# Patient Record
Sex: MALE
[De-identification: names, ages, dates, MRNs, and addresses within clinical notes are randomized per-mention and may not be internally consistent; named-entity substitution may affect disease eponyms.]

---

## 2017-09-19 ENCOUNTER — HOSPITAL ENCOUNTER (INPATIENT)
Dept: HOSPITAL 31 - C.ER | Age: 47
LOS: 6 days | Discharge: HOME | DRG: 745 | End: 2017-09-25
Attending: PSYCHIATRY & NEUROLOGY | Admitting: PSYCHIATRY & NEUROLOGY
Payer: MEDICAID

## 2017-09-19 VITALS — BODY MASS INDEX: 29 KG/M2

## 2017-09-19 DIAGNOSIS — F32.9: ICD-10-CM

## 2017-09-19 DIAGNOSIS — F17.210: ICD-10-CM

## 2017-09-19 DIAGNOSIS — G89.29: ICD-10-CM

## 2017-09-19 DIAGNOSIS — F11.23: Primary | ICD-10-CM

## 2017-09-19 DIAGNOSIS — F13.239: ICD-10-CM

## 2017-09-19 DIAGNOSIS — F41.9: ICD-10-CM

## 2017-09-19 LAB
ALBUMIN/GLOB SERPL: 1.4 {RATIO} (ref 1–2.1)
ALP SERPL-CCNC: 81 U/L (ref 38–126)
ALT SERPL-CCNC: 48 U/L (ref 21–72)
AST SERPL-CCNC: 36 U/L (ref 17–59)
BASOPHILS # BLD AUTO: 0.1 K/UL (ref 0–0.2)
BASOPHILS NFR BLD: 0.6 % (ref 0–2)
BILIRUB SERPL-MCNC: 0.5 MG/DL (ref 0.2–1.3)
BILIRUB UR-MCNC: NEGATIVE MG/DL
BUN SERPL-MCNC: 13 MG/DL (ref 9–20)
CALCIUM SERPL-MCNC: 8.9 MG/DL (ref 8.6–10.4)
CHLORIDE SERPL-SCNC: 102 MMOL/L (ref 98–107)
CO2 SERPL-SCNC: 25 MMOL/L (ref 22–30)
EOSINOPHIL # BLD AUTO: 0.2 K/UL (ref 0–0.7)
EOSINOPHIL NFR BLD: 1.5 % (ref 0–4)
ERYTHROCYTE [DISTWIDTH] IN BLOOD BY AUTOMATED COUNT: 13.2 % (ref 11.5–14.5)
ETHANOL SERPL-MCNC: < 10 MG/DL (ref 0–10)
GLOBULIN SER-MCNC: 3.1 GM/DL (ref 2.2–3.9)
GLUCOSE SERPL-MCNC: 75 MG/DL (ref 75–110)
GLUCOSE UR STRIP-MCNC: NORMAL MG/DL
HCT VFR BLD CALC: 40.3 % (ref 35–51)
KETONES UR STRIP-MCNC: NEGATIVE MG/DL
LEUKOCYTE ESTERASE UR-ACNC: (no result) LEU/UL
LYMPHOCYTES # BLD AUTO: 3 K/UL (ref 1–4.3)
LYMPHOCYTES NFR BLD AUTO: 27.4 % (ref 20–40)
MCH RBC QN AUTO: 30.1 PG (ref 27–31)
MCHC RBC AUTO-ENTMCNC: 34.5 G/DL (ref 33–37)
MCV RBC AUTO: 87.2 FL (ref 80–94)
MONOCYTES # BLD: 0.8 K/UL (ref 0–0.8)
MONOCYTES NFR BLD: 7 % (ref 0–10)
NRBC BLD AUTO-RTO: 0.1 % (ref 0–2)
PH UR STRIP: 5 [PH] (ref 5–8)
PLATELET # BLD: 148 K/UL (ref 130–400)
PMV BLD AUTO: 10.6 FL (ref 7.2–11.7)
POTASSIUM SERPL-SCNC: 3.8 MMOL/L (ref 3.6–5.2)
PROT SERPL-MCNC: 7.4 G/DL (ref 6.3–8.3)
PROT UR STRIP-MCNC: NEGATIVE MG/DL
RBC # UR STRIP: NEGATIVE /UL
RBC #/AREA URNS HPF: < 1 /HPF (ref 0–3)
SODIUM SERPL-SCNC: 139 MMOL/L (ref 132–148)
SP GR UR STRIP: 1.02 (ref 1–1.03)
UROBILINOGEN UR-MCNC: NORMAL MG/DL (ref 0.2–1)
WBC # BLD AUTO: 11 K/UL (ref 4.8–10.8)
WBC #/AREA URNS HPF: < 1 /HPF (ref 0–5)

## 2017-09-19 PROCEDURE — HZ2ZZZZ DETOXIFICATION SERVICES FOR SUBSTANCE ABUSE TREATMENT: ICD-10-PCS | Performed by: PSYCHIATRY & NEUROLOGY

## 2017-09-19 PROCEDURE — HZ59ZZZ INDIVIDUAL PSYCHOTHERAPY FOR SUBSTANCE ABUSE TREATMENT, SUPPORTIVE: ICD-10-PCS | Performed by: PSYCHIATRY & NEUROLOGY

## 2017-09-19 PROCEDURE — GZ3ZZZZ MEDICATION MANAGEMENT: ICD-10-PCS | Performed by: PSYCHIATRY & NEUROLOGY

## 2017-09-19 PROCEDURE — HZ46ZZZ GROUP COUNSELING FOR SUBSTANCE ABUSE TREATMENT, PSYCHOEDUCATION: ICD-10-PCS | Performed by: PSYCHIATRY & NEUROLOGY

## 2017-09-19 NOTE — PCM.BM
<Meli Tello - Last Filed: 09/19/17 20:06>





Treatment Plan Problems





- Problems identified on initial assessmt


  ** potential for opiates witdrawal


Date Initiated: 09/19/17


Time Initiated: 20:07


Assessment reference: NA


Status: Active





  ** anxiety


Date Initiated: 09/19/17


Time Initiated: 20:08


Assessment reference: NA


Status: Active





Treatment assets and liabiliti


Patient Assests: cooperative, insightful, motivated, self-reliant, ADL 

independent, physically healthy, good support system, negotiates basic needs, 

cognitively intact


Patient Liabilities: substance abuse, medical problems





- Milieu Protocol


Maintain good personal hygiene: daily Encourage regular showers, daily Remind 

patient to perform daily oral care, daily Assist patient to perform ADL's


Conduct patient checks and document Observation sheet: Q15 minutes


Maintain personal safety: every shift Educate patient to report safety concerns 

to staff, every shift Monitor environment for contraband/sharps


Medication safety: Monitor for expected outcome, potential side effects: every 

shift, Assess barriers to learning: every shift, Assess readiness for 

medication education: every shift





<Juanita Bishop - Last Filed: 09/20/17 15:33>





Family Contact


Family involvement: Family/SO is involved


Family contact: Patient agrees to contact, Telephone contact initiated by staff





- Goals for Treatment


Patient goals for treatment: Complete detox and accept referral for pain 

management MD. Incorporate o/p therapy in conjunction.





Discharge/Continuing Care





- Education Needs


Education Needs: Patient Medication, Patient Diagnosis/Disease Process, Patient 

Coping Skills, Patient Placement options, Patient Community resources, Patient 

Pain





- Discharge


Discharge Criteria: Normal sleep pattern, No longer exhibiting s/s of withdrawal

, Reduction of target symptoms


Discharge to:: With Family





- Treatment Team Participation


Patient/Family/SO Statement: 





09/20/17 15:33


"I need a new pain management doctor who won't put me back on opiates."


Discussed with Family/SO: No


Was Patient/Family/SO present at Treatment Team Meeting: Yes





<Roc Vasquez - Last Filed: 09/27/17 00:33>





- Diagnosis


(1) Opioid dependence


Status: Acute   


Interventions: 





09/25/17 11:32


* Assess 7x/week regarding severity of withdrawal


* Educate regarding risks, benefits, side effects and alternatives of 

medications


* Use Motivational Interviewing for abstinence


* Use CBT for relapse prevention


* Medication management for withdrawal symptoms


* Encourage medication assisted treatment
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
## 2017-09-21 RX ADMIN — BUPRENORPHINE HCL SCH MG: 2 TABLET SUBLINGUAL at 10:06

## 2017-09-21 NOTE — PCM.PYCHPN
Psychiatric Progress Note





- Psychiatric Progress Note


Patient seen today, length of contact: 15 minutes


Patient Chief Complaint: 


"I feel like hell"


Problems Identified/Issues Discussed: 


The patient was seen, chart reviewed, case discussed with staff. 


The patient is compliant with medications and reports no side effects. He 

states that he felt a little better after getting medications yesterday. 


Patient states that he slept a little bit last night but feels "like hell" 

right now. He reports feeling exhausted, down, and very tired. 


Patient needs more time to stabilize. 


After care discussed, support and psychoeducation given.


Medication Change: Yes (detox changes daily; Baclofen 10mg PO)


Medical Record Reviewed: Yes





Mental Status Examination





- Cognitive Function


Orientation: Person, Place, Situation, Time


Memory: Intact


Attention: Poor


Concentration: Poor


Association: WNL


Fund of Knowledge: WNL





- Mood


Mood: Anxious





- Affect


Affect: Constricted





- Formal Thought Process


Formal Thought Process: No Impairment





- Suicidal Ideation


Suicidal Ideation: No





- Homicidal Ideation


Homicidal Ideation: No





Goal/Treatment Plan





- Goal/Treatment Plan


Need for Continued Stay: Discharge may exacerbated symptoms


Progress Toward Problem(s) and Goals/Treatment Plan: 


Subutex detox


As needed medications


Gabapentin for augmentation


Attend groups and activities


Supportive therapy and psychoeducation


MI for abstinence


CBT for relapse prevention


Encourage MAT


Refer to rehab or IOP


Attend self-help groups as well

## 2017-09-22 RX ADMIN — BUPRENORPHINE HCL SCH MG: 2 TABLET SUBLINGUAL at 09:28

## 2017-09-22 NOTE — PCM.PYCHPN
Psychiatric Progress Note





- Psychiatric Progress Note


Patient seen today, length of contact: 15 minutes


Patient Chief Complaint: 


"I feel bad"


Problems Identified/Issues Discussed: 


The pt is seen, chart reviewed, case discussed with staff.


Support given, CBT and MI used briefly


No new symptoms reported, improving slowly and needs more time


No SEs from medications, risks discussed.


After care discussed


Medication Change: Yes (detox changes daily)


Medical Record Reviewed: Yes





Mental Status Examination





- Cognitive Function


Orientation: Person, Place, Situation, Time


Memory: Intact


Attention: Poor


Concentration: Poor


Association: WNL


Fund of Knowledge: WNL





- Mood


Mood: Anxious





- Affect


Affect: Constricted





- Formal Thought Process


Formal Thought Process: No Impairment





- Suicidal Ideation


Suicidal Ideation: No





- Homicidal Ideation


Homicidal Ideation: No





Goal/Treatment Plan





- Goal/Treatment Plan


Need for Continued Stay: Discharge may exacerbated symptoms


Progress Toward Problem(s) and Goals/Treatment Plan: 


Subutex detox


As needed medications


Gabapentin for augmentation


Attend groups and activities


Supportive therapy and psychoeducation


MI for abstinence


CBT for relapse prevention


Encourage MAT


Refer to rehab or IOP


Attend self-help groups as well

## 2017-09-23 RX ADMIN — BUPRENORPHINE HCL SCH MG: 2 TABLET SUBLINGUAL at 09:48

## 2017-09-23 NOTE — PCM.PYCHPN
Psychiatric Progress Note





- Psychiatric Progress Note


Patient seen today, length of contact: 15 minutes


Patient Chief Complaint: 





I still feel anxiety


Problems Identified/Issues Discussed: 





Patient seen. Chart reviewed. Case discussed with the staff.


Issues related to illness and treatment were discussed with the patient.


Reported compliant with treatment with no adverse affects. Tolerating treatment 

very well.


Reported feeling much better with treatment but still feels anxiety at times.


The time of evaluation, patient was awake alert oriented 3, no delusions, no 

auditory or visual hallucinations, no suicidal ideations or homicidal ideations.


Medical Problems: 





None reported


Diagnostic Results: 





Reviewed


DSM 5 Symptoms Update: 





Improving with treatment


Medication Change: No


Medical Record Reviewed: Yes





Mental Status Examination





- Cognitive Function


Orientation: Person, Place, Situation, Time


Memory: Intact


Attention: WNL


Concentration: WNL


Association: WNL


Fund of Knowledge: [facility]


Decription of patient's judgement and insights: 





Fair





- Mood


Mood: Anxious





- Affect


Affect: Other (Appropriate)





- Speech


Speech: Appropriate





- Formal Thought Process


Formal Thought Process: No Impairment


Psychotic Thoughts and Behaviors: 





None





- Suicidal Ideation


Suicidal Ideation: No





- Homicidal Ideation


Homicidal Ideation: No





Goal/Treatment Plan





- Goal/Treatment Plan


Need for Continued Stay: Remain at risks for inpatient hospitalization, 

Discharge may exacerbated symptoms, Severe functional impairment


Progress Toward Problem(s) and Goals/Treatment Plan: 





Patient education


Supportive therapy


Continue treatment as before





Estimated Date of D/C: 09/25/17





- Smoking Cessation


Smoking Cessation Initiated: No

## 2017-09-24 VITALS — RESPIRATION RATE: 18 BRPM

## 2017-09-24 RX ADMIN — BUPRENORPHINE HCL SCH MG: 2 TABLET SUBLINGUAL at 09:54

## 2017-09-24 NOTE — PCM.PYCHPN
Psychiatric Progress Note





- Psychiatric Progress Note


Patient seen today, length of contact: 15 minutes


Patient Chief Complaint: 





I still feel anxiety.


Problems Identified/Issues Discussed: 





Patient seen. Chart reviewed. Case discussed with the staff.


Issues related to illness and treatment were discussed with the patient.


Reported compliant with treatment with no adverse affects. Tolerating treatment 

very well.


Reported feeling much better with treatment but still feels anxiety at times.


Patient is anxious all in the morning after awakening or before going to bed 

which she related to the issues of discharge from the hospital.


The time of evaluation, patient was awake alert oriented 3, no delusions, no 

auditory or visual hallucinations, no suicidal ideations or homicidal ideations.


Medical Problems: 








None reported


Diagnostic Results: 








Reviewed


DSM 5 Symptoms Update: 





Improving with treatment


Medication Change: No


Medical Record Reviewed: Yes





Mental Status Examination





- Cognitive Function


Orientation: Person, Place, Situation, Time


Memory: Intact


Attention: WNL


Concentration: WNL


Association: [facility]


Fund of Knowledge: [facility]


Decription of patient's judgement and insights: 








Fair





- Mood


Mood: Neutral





- Affect


Affect: Other (Appropriate)





- Speech


Speech: Appropriate





- Formal Thought Process


Formal Thought Process: No Impairment


Psychotic Thoughts and Behaviors: 








None





- Suicidal Ideation


Suicidal Ideation: No





- Homicidal Ideation


Homicidal Ideation: No





Goal/Treatment Plan





- Goal/Treatment Plan


Need for Continued Stay: Remain at risks for inpatient hospitalization, 

Discharge may exacerbated symptoms, Severe functional impairment


Progress Toward Problem(s) and Goals/Treatment Plan: 








Patient education


Supportive therapy


Continue treatment as before





Estimated Date of D/C: 09/25/17





- Smoking Cessation


Smoking Cessation Initiated: No

## 2017-09-25 VITALS
SYSTOLIC BLOOD PRESSURE: 131 MMHG | HEART RATE: 81 BPM | TEMPERATURE: 98 F | OXYGEN SATURATION: 98 % | DIASTOLIC BLOOD PRESSURE: 82 MMHG

## 2017-09-25 RX ADMIN — BUPRENORPHINE HCL SCH MG: 2 TABLET SUBLINGUAL at 09:13
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
## 2018-02-09 ENCOUNTER — HOSPITAL ENCOUNTER (INPATIENT)
Dept: HOSPITAL 31 - C.ER | Age: 48
LOS: 5 days | Discharge: HOME | DRG: 745 | End: 2018-02-14
Attending: PSYCHIATRY & NEUROLOGY | Admitting: PSYCHIATRY & NEUROLOGY
Payer: MEDICAID

## 2018-02-09 VITALS — BODY MASS INDEX: 31.4 KG/M2

## 2018-02-09 DIAGNOSIS — F39: ICD-10-CM

## 2018-02-09 DIAGNOSIS — G47.00: ICD-10-CM

## 2018-02-09 DIAGNOSIS — F14.20: ICD-10-CM

## 2018-02-09 DIAGNOSIS — F41.9: ICD-10-CM

## 2018-02-09 DIAGNOSIS — F13.230: ICD-10-CM

## 2018-02-09 DIAGNOSIS — F17.210: ICD-10-CM

## 2018-02-09 DIAGNOSIS — F31.9: ICD-10-CM

## 2018-02-09 DIAGNOSIS — F11.23: Primary | ICD-10-CM

## 2018-02-09 LAB
ALBUMIN SERPL-MCNC: 4.4 G/DL (ref 3.5–5)
ALBUMIN/GLOB SERPL: 1.1 {RATIO} (ref 1–2.1)
ALT SERPL-CCNC: 49 U/L (ref 21–72)
AST SERPL-CCNC: 36 U/L (ref 17–59)
BASOPHILS # BLD AUTO: 0.1 K/UL (ref 0–0.2)
BASOPHILS NFR BLD: 0.6 % (ref 0–2)
BILIRUB UR-MCNC: NEGATIVE MG/DL
BUN SERPL-MCNC: 9 MG/DL (ref 9–20)
CALCIUM SERPL-MCNC: 9.5 MG/DL (ref 8.6–10.4)
EOSINOPHIL # BLD AUTO: 0 K/UL (ref 0–0.7)
EOSINOPHIL NFR BLD: 0.2 % (ref 0–4)
ERYTHROCYTE [DISTWIDTH] IN BLOOD BY AUTOMATED COUNT: 13.3 % (ref 11.5–14.5)
GFR NON-AFRICAN AMERICAN: > 60
GLUCOSE UR STRIP-MCNC: NORMAL MG/DL
HGB BLD-MCNC: 15.1 G/DL (ref 12–18)
LEUKOCYTE ESTERASE UR-ACNC: (no result) LEU/UL
LYMPHOCYTES # BLD AUTO: 1.6 K/UL (ref 1–4.3)
LYMPHOCYTES NFR BLD AUTO: 14 % (ref 20–40)
MCH RBC QN AUTO: 30 PG (ref 27–31)
MCHC RBC AUTO-ENTMCNC: 35 G/DL (ref 33–37)
MCV RBC AUTO: 85.7 FL (ref 80–94)
MONOCYTES # BLD: 0.5 K/UL (ref 0–0.8)
MONOCYTES NFR BLD: 4.2 % (ref 0–10)
NEUTROPHILS # BLD: 9.5 K/UL (ref 1.8–7)
NEUTROPHILS NFR BLD AUTO: 81 % (ref 50–75)
NRBC BLD AUTO-RTO: 0.1 % (ref 0–2)
PH UR STRIP: 7 [PH] (ref 5–8)
PLATELET # BLD: 207 K/UL (ref 130–400)
PMV BLD AUTO: 9.3 FL (ref 7.2–11.7)
PROT UR STRIP-MCNC: NEGATIVE MG/DL
RBC # BLD AUTO: 5.02 MIL/UL (ref 4.4–5.9)
RBC # UR STRIP: NEGATIVE /UL
SP GR UR STRIP: 1 (ref 1–1.03)
SQUAMOUS EPITHIAL: < 1 /HPF (ref 0–5)
URINE NITRATE: NEGATIVE
UROBILINOGEN UR-MCNC: NORMAL MG/DL (ref 0.2–1)
WBC # BLD AUTO: 11.7 K/UL (ref 4.8–10.8)

## 2018-02-09 PROCEDURE — HZ52ZZZ INDIVIDUAL PSYCHOTHERAPY FOR SUBSTANCE ABUSE TREATMENT, COGNITIVE-BEHAVIORAL: ICD-10-PCS | Performed by: PSYCHIATRY & NEUROLOGY

## 2018-02-09 PROCEDURE — HZ46ZZZ GROUP COUNSELING FOR SUBSTANCE ABUSE TREATMENT, PSYCHOEDUCATION: ICD-10-PCS | Performed by: PSYCHIATRY & NEUROLOGY

## 2018-02-09 PROCEDURE — HZ59ZZZ INDIVIDUAL PSYCHOTHERAPY FOR SUBSTANCE ABUSE TREATMENT, SUPPORTIVE: ICD-10-PCS | Performed by: PSYCHIATRY & NEUROLOGY

## 2018-02-09 PROCEDURE — HZ42ZZZ GROUP COUNSELING FOR SUBSTANCE ABUSE TREATMENT, COGNITIVE-BEHAVIORAL: ICD-10-PCS | Performed by: PSYCHIATRY & NEUROLOGY

## 2018-02-09 PROCEDURE — HZ2ZZZZ DETOXIFICATION SERVICES FOR SUBSTANCE ABUSE TREATMENT: ICD-10-PCS | Performed by: PSYCHIATRY & NEUROLOGY

## 2018-02-09 PROCEDURE — HZ56ZZZ INDIVIDUAL PSYCHOTHERAPY FOR SUBSTANCE ABUSE TREATMENT, PSYCHOEDUCATION: ICD-10-PCS | Performed by: PSYCHIATRY & NEUROLOGY

## 2018-02-09 NOTE — PCM.BM
<Lorena Navarro - Last Filed: 02/09/18 16:52>





Treatment Plan Problems





- Problems identified on initial assessmt


  ** Ineffective Coping Skills


Date Initiated: 02/09/18


Time Initiated: 16:52


Assessment reference: NA


Status: Active





Treatment assets and liabiliti


Patient Assests: cooperative, insightful, motivated, self-reliant, ADL 

independent, physically healthy, good support system, negotiates basic needs, 

cognitively intact


Patient Liabilities: financial problems, substance abuse





- Milieu Protocol


Maintain good personal hygiene: daily Encourage regular showers, daily Remind 

patient to perform daily oral care, other Assist patient to perform ADL's


Maintain personal safety: every shift Educate patient to report safety concerns 

to staff, every shift Monitor environment for contraband/sharps


Medication safety: Monitor for expected outcome, potential side effects: every 

shift, Assess barriers to learning: every shift, Assess readiness for 

medication education: every shift





<Roc Vasquez - Last Filed: 02/12/18 00:28>





- Diagnosis


(1) Opioid dependence


Status: Acute   


Interventions: 





02/12/18 00:28


* Assess 7x/week regarding severity of withdrawal


* Educate regarding risks, benefits, side effects and alternatives of 

medications


* Use Motivational Interviewing for abstinence


* Use CBT for relapse prevention


* Medication management for withdrawal symptoms


* Encourage medication assisted treatment


*

## 2018-02-09 NOTE — C.PDOC
History Of Present Illness


47-year-old male, presents to the emergency department requesting detox from 

poly-substance abuse. Patient states he uses Heroin, Oxycodone, Oxycontin and 

Cocaine, patient also with a hx of EtOH abuse. Denies vomiting/diarrhea, pain, 

dizziness, HI/SI. No other complaints


Time Seen by Provider: 02/09/18 14:23


History Per: Patient


History/Exam Limitations: no limitations





Past Medical History


Reviewed: Historical Data, Nursing Documentation, Vital Signs


Vital Signs: 


 Last Vital Signs











Temp  97.6 F   02/13/18 18:33


 


Pulse  110 H  02/13/18 18:33


 


Resp  18   02/13/18 18:33


 


BP  131/87   02/13/18 18:33


 


Pulse Ox  96   02/13/18 18:33














- Medical History


PMH: Anxiety, Bipolar Disorder, Depression


   Denies: Diabetes, Hepatitis, HIV, HTN, Chronic Kidney Disease, Seizures, 

Sexually Transmitted Disease





- [facility] Procedures








DETOXIFICATION SERVICES FOR SUBSTANCE ABUSE TREATMENT (09/19/17)


DRAINAGE OF NECK, PERCUTANEOUS APPROACH, DIAGNOSTIC (06/13/16)


GROUP  FOR SUBSTANCE ABUSE TREATMENT, PSYCHOEDUCATION (09/19/17)


INDIV PSYCHOTHERAPY FOR SUBSTANCE ABUSE TREATMENT, SUPPORT (09/19/17)


INJECT/INFUSE NEC (02/15/15)


MEDICATION MANAGEMENT (09/19/17)


PSYCHIA INTERV/EVAL NEC (05/01/15)


PSYCHIAT DRUG THERAP NEC (04/18/15)


VENOUS PUNCTURE NEC (05/01/15)








Family History: States: No Known Family Hx





- Social History


Hx Alcohol Use: Yes


Hx Substance Use: Yes





- Immunization History


Hx Tetanus Toxoid Vaccination: Yes


Hx Influenza Vaccination: No


Hx Pneumococcal Vaccination: No





Review Of Systems


Constitutional: Negative for: Fever


Gastrointestinal: Negative for: Nausea, Vomiting, Abdominal Pain


Neurological: Negative for: Headache, Dizziness


Psych: Negative for: Psychosis, Suicidal ideation, Withdrawal





Physical Exam





- Physical Exam


Appears: Non-toxic, No Acute Distress, Other (Calm, cooperative)


Skin: Normal Color, Warm, Dry, No Rash, No Jaundice


Head: Normacephalic


Neck: Normal ROM


Cardiovascular: Rhythm Regular, No Murmur


Respiratory: Normal Breath Sounds, No Accessory Muscle Use


Extremity: Normal ROM


Neurological/Psych: Oriented x3, Normal Speech





ED Course And Treatment





- Laboratory Results


Result Diagrams: 


 02/09/18 15:12





 02/09/18 15:12


Lab Interpretation: No Acute Changes


O2 Sat by Pulse Oximetry: 96 (RA)


Pulse Ox Interpretation: Normal


Progress Note: Patient is medically cleared for PES evaluation.  After pt was 

evaluated by PES and case dsicussed ith on-call psych, admission to detox floor 

accepted.  Pt remained tsable during the ed evaluation.  NOn-toxic, appropriate.





Disposition





- Disposition


Disposition: HOSPITALIZED


Disposition Time: 16:10


Condition: STABLE





- Clinical Impression


Clinical Impression: 


 Drug dependence, Opioid dependence








- Scribe Statement


The provider has reviewed the documentation as recorded by the Scribe (Raina Sellers)








All medical record entries made by the Scribe were at my direction and 

personally dictated by me. I have reviewed the chart and agree that the record 

accurately reflects my personal performance of the history, physical exam, 

medical decision making, and the department course for this patient. I have 

also personally directed, reviewed, and agree with the discharge instructions 

and disposition.
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
## 2018-02-11 NOTE — PCM.PYCHPN
Psychiatric Progress Note





- Psychiatric Progress Note


Patient Chief Complaint: 





"I feel very bad"


Problems Identified/Issues Discussed: 





The pt is seen, chart reviewed, case discussed with staff.


The pt is compliant with medications and reports no side-effects.


Symptoms are improving but needs more time to stabilize. 


Still anxious and VERY med seeking


After care discussed, support and psychoeducation given.





Medication Change: Yes (detox changes daily)


Medical Record Reviewed: Yes





Mental Status Examination





- Cognitive Function


Orientation: Person, Place, Situation, Time


Memory: Impaired


Attention: Poor


Concentration: Poor


Association: WNL


Fund of Knowledge: WNL





- Mood


Mood: Anxious





- Affect


Affect: Constricted





- Speech


Speech: Appropriate





- Formal Thought Process


Formal Thought Process: No Impairment





- Suicidal Ideation


Suicidal Ideation: No





- Homicidal Ideation


Homicidal Ideation: No





Goal/Treatment Plan





- Goal/Treatment Plan


Need for Continued Stay: Discharge may exacerbated symptoms, Severe functional 

impairment


Progress Toward Problem(s) and Goals/Treatment Plan: 


Methadone taper


Librium taper


Seroquel for irritability, mood swings and insomnia/depression


Gabapentin for augmentation


As needed medications


All risks, benefits and alternatives of the meds discussed,


 and the pt agreed and understood. 


Attend groups and activities


Supportive therapy and psychoeducation


MI for abstinence


CBT for relapse prevention


Encourage MAT


Refer to rehab or IOP, and self-help groups


Smoking cessation with MI


Nicotine patch if needed

## 2018-02-12 NOTE — PCM.PYCHPN
Psychiatric Progress Note





- Psychiatric Progress Note


Patient seen today, length of contact: 15 min


Patient Chief Complaint: 





"I am very irritable"


Problems Identified/Issues Discussed: 


The pt is seen, chart reviewed, case discussed with staff. The patient reports 

that he is feeling very irritable saying, I want to jump out of my skin. The 

patient reports difficulty sleeping and having cold sweats last night. He feels 

like he doesnt feel any of the effects of the medications. THe pt reports that 

Ativan had worked for him before. 


The pt is compliant with medications and reports no side-effects. Symptoms 

improving and patient needs more time to stabilize. After care discussed, 

support and psychoeducation given.








Medication Change: Yes (detox changes daily)


Medical Record Reviewed: Yes





Mental Status Examination





- Cognitive Function


Orientation: Person, Place, Situation, Time


Memory: Intact


Association: WNL


Fund of Knowledge: WNL





- Mood


Mood: Anxious





- Affect


Affect: Constricted





- Speech


Speech: Appropriate





- Formal Thought Process


Formal Thought Process: No Impairment





- Suicidal Ideation


Suicidal Ideation: No





- Homicidal Ideation


Homicidal Ideation: No





Goal/Treatment Plan





- Goal/Treatment Plan


Need for Continued Stay: Discharge may exacerbated symptoms, Severe functional 

impairment


Progress Toward Problem(s) and Goals/Treatment Plan: 


Methadone taper


Librium taper


Seroquel for irritability, mood swings and insomnia/depression


Gabapentin for augmentation


As needed medications


All risks, benefits and alternatives of the meds discussed,


 and the pt agreed and understood. 


Attend groups and activities


Supportive therapy and psychoeducation


MI for abstinence


CBT for relapse prevention


Encourage MAT


Refer to rehab or IOP, and self-help groups


Smoking cessation with MI


Nicotine patch if needed





Estimated Date of D/C: 02/14/18





- Smoking Cessation


Smoking Cessation Initiated: Yes

## 2018-02-13 VITALS — RESPIRATION RATE: 18 BRPM

## 2018-02-13 NOTE — PCM.PYCHPN
Psychiatric Progress Note





- Psychiatric Progress Note


Patient seen today, length of contact: 15 min


Patient Chief Complaint: 





"I am still feeling irritable"


Problems Identified/Issues Discussed: 


The pt is seen, chart reviewed, case discussed with staff. The patient reports 

that he is feeling very irritable but is feeling better from yesterday. The pt 

is compliant with medications and reports no side-effects. Symptoms improving 

and patient needs more time to stabilize. After care discussed, support and 

psychoeducation given. The patient reports that he has no other symptoms and no 

complaints. 








Medication Change: Yes (detox changes daily)


Medical Record Reviewed: Yes





Mental Status Examination





- Cognitive Function


Orientation: Person, Place, Situation, Time


Memory: Intact


Attention: WNL


Concentration: WNL


Association: WNL


Fund of Knowledge: WNL





- Mood


Mood: Anxious





- Affect


Affect: Broad





- Speech


Speech: Appropriate





- Formal Thought Process


Formal Thought Process: No Impairment





- Suicidal Ideation


Suicidal Ideation: No





- Homicidal Ideation


Homicidal Ideation: No





Goal/Treatment Plan





- Goal/Treatment Plan


Need for Continued Stay: Discharge may exacerbated symptoms, Severe functional 

impairment


Progress Toward Problem(s) and Goals/Treatment Plan: 


Methadone taper


Librium taper


Seroquel for irritability, mood swings and insomnia/depression


Gabapentin for augmentation


As needed medications


All risks, benefits and alternatives of the meds discussed,


 and the pt agreed and understood. 


Attend groups and activities


Supportive therapy and psychoeducation


MI for abstinence


CBT for relapse prevention


Encourage MAT


Refer to rehab or IOP, and self-help groups


Smoking cessation with MI


Nicotine patch if needed





Estimated Date of D/C: 02/14/18

## 2018-02-14 VITALS
DIASTOLIC BLOOD PRESSURE: 89 MMHG | SYSTOLIC BLOOD PRESSURE: 136 MMHG | HEART RATE: 101 BPM | TEMPERATURE: 97.6 F | OXYGEN SATURATION: 98 %
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
## 2018-04-26 ENCOUNTER — HOSPITAL ENCOUNTER (EMERGENCY)
Dept: HOSPITAL 42 - ED | Age: 48
Discharge: LEFT BEFORE DISCHARGE | End: 2018-04-26
Payer: MEDICAID

## 2018-04-26 ENCOUNTER — HOSPITAL ENCOUNTER (EMERGENCY)
Dept: HOSPITAL 42 - ED | Age: 48
Discharge: HOME | End: 2018-04-26
Payer: MEDICAID

## 2018-04-26 VITALS — TEMPERATURE: 98 F | OXYGEN SATURATION: 98 %

## 2018-04-26 VITALS — BODY MASS INDEX: 34.5 KG/M2

## 2018-04-26 VITALS — SYSTOLIC BLOOD PRESSURE: 132 MMHG | RESPIRATION RATE: 18 BRPM | DIASTOLIC BLOOD PRESSURE: 73 MMHG | HEART RATE: 80 BPM

## 2018-04-26 DIAGNOSIS — Z02.89: Primary | ICD-10-CM

## 2018-04-26 DIAGNOSIS — F17.210: ICD-10-CM

## 2018-04-26 DIAGNOSIS — Z00.00: Primary | ICD-10-CM

## 2018-04-26 DIAGNOSIS — R60.0: ICD-10-CM

## 2018-04-26 DIAGNOSIS — Z00.00: ICD-10-CM

## 2018-04-26 LAB
ALBUMIN SERPL-MCNC: 3.9 G/DL (ref 3–4.8)
ALBUMIN/GLOB SERPL: 1.3 {RATIO} (ref 1.1–1.8)
ALT SERPL-CCNC: 59 U/L (ref 7–56)
APPEARANCE UR: CLEAR
AST SERPL-CCNC: 52 U/L (ref 17–59)
BASOPHILS # BLD AUTO: 0.02 K/MM3 (ref 0–2)
BASOPHILS NFR BLD: 0.2 % (ref 0–3)
BILIRUB UR-MCNC: NEGATIVE MG/DL
BNP SERPL-MCNC: 23.9 PG/ML (ref 0–450)
BUN SERPL-MCNC: 17 MG/DL (ref 7–21)
CALCIUM SERPL-MCNC: 8.9 MG/DL (ref 8.4–10.5)
COLOR UR: YELLOW
EOSINOPHIL # BLD: 0.2 10*3/UL (ref 0–0.7)
EOSINOPHIL NFR BLD: 2.2 % (ref 1.5–5)
ERYTHROCYTE [DISTWIDTH] IN BLOOD BY AUTOMATED COUNT: 12.7 % (ref 11.5–14.5)
GFR NON-AFRICAN AMERICAN: > 60
GLUCOSE UR STRIP-MCNC: NEGATIVE MG/DL
GRANULOCYTES # BLD: 4.65 10*3/UL (ref 1.4–6.5)
GRANULOCYTES NFR BLD: 57.8 % (ref 50–68)
HGB BLD-MCNC: 13.1 G/DL (ref 14–18)
LEUKOCYTE ESTERASE UR-ACNC: NEGATIVE LEU/UL
LYMPHOCYTES # BLD: 2.6 10*3/UL (ref 1.2–3.4)
LYMPHOCYTES NFR BLD AUTO: 31.9 % (ref 22–35)
MCH RBC QN AUTO: 29.7 PG (ref 25–35)
MCHC RBC AUTO-ENTMCNC: 34.2 G/DL (ref 31–37)
MCV RBC AUTO: 86.8 FL (ref 80–105)
MONOCYTES # BLD AUTO: 0.6 10*3/UL (ref 0.1–0.6)
MONOCYTES NFR BLD: 7.9 % (ref 1–6)
PH UR STRIP: 6.5 [PH] (ref 4.7–8)
PLATELET # BLD: 153 10^3/UL (ref 120–450)
PMV BLD AUTO: 11.2 FL (ref 7–11)
PROT UR STRIP-MCNC: NEGATIVE MG/DL
RBC # BLD AUTO: 4.41 10^6/UL (ref 3.5–6.1)
RBC # UR STRIP: NEGATIVE /UL
SP GR UR STRIP: 1.01 (ref 1–1.03)
UROBILINOGEN UR STRIP-ACNC: 2 E.U./DL
WBC # BLD AUTO: 8.1 10^3/UL (ref 4.5–11)
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
## 2018-04-26 NOTE — CARD
--------------- APPROVED REPORT --------------





EKG Measurement

Heart [id number]

MN [id number]

[id number]

[id number]

[id number]



<Conclusion>

Normal sinus rhythm

Normal ECG

## 2018-04-26 NOTE — US
PROCEDURE:  Left lower extremity venous US



HISTORY:

Leg pain and swelling. Evaluate for DVT.



PHYSICIAN(S):  Teddy Atkinson MD.



TECHNIQUE:

Duplex sonography and color-flow Doppler with graded compression were 

used to evaluate the deep venous system of the left lower extremity. 



FINDINGS:

The visualized deep venous system of the left lower extremity is 

sonographically normal and compressible. Normal wave forms and 

augmentation are seen. There is no sonographic evidence for deep 

venous thrombosis in the visualized segments of the left lower 

extremity.



IMPRESSION:

1. No sonographic evidence for deep venous thrombosis in the 

visualized segments of the left lower extremity.

## 2018-04-26 NOTE — US
HISTORY:

Arm pain and swelling. Evaluate for deep venous thrombosis.



PHYSICIAN(S):  Teddy Atkinson MD.



FINDINGS:

The visualized internal jugular veins are sonographically normal and 

compressible. No evidence of obstruction or thrombus this is seen.



The visualized segments of the subclavian veins are patent with 

normal waveforms. No sonographic evidence of obstruction or 

thrombosis is seen.



The visualized deep venous systems of both upper extremities 

proximally are sonographically normal and compressible.



IMPRESSION:

1. No sonographic evidence for deep venous thrombosis in the 

visualized segments of both upper strategies.

## 2018-04-26 NOTE — RAD
HISTORY:

general body swelling  



COMPARISON:

Comparison chest 12/20/2018 



TECHNIQUE:

Chest PA and lateral



FINDINGS:



LUNGS:

Minimal linear atelectasis and or scarring left lateral lower lung 

zone as well as the right lung base.



PLEURA:

No significant pleural effusion identified. No pneumothorax apparent.



CARDIOVASCULAR:

Normal.



OSSEOUS STRUCTURES:

No significant abnormalities.



VISUALIZED UPPER ABDOMEN:

Normal.



OTHER FINDINGS:

None.



IMPRESSION:

No acute consolidation. Minimal bibasilar atelectasis

## 2018-05-01 ENCOUNTER — HOSPITAL ENCOUNTER (EMERGENCY)
Dept: HOSPITAL 42 - ED | Age: 48
LOS: 1 days | Discharge: HOME | End: 2018-05-02
Payer: MEDICAID

## 2018-05-01 VITALS — BODY MASS INDEX: 34.5 KG/M2

## 2018-05-01 DIAGNOSIS — S61.216A: Primary | ICD-10-CM
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
## 2018-05-01 DIAGNOSIS — Z23: ICD-10-CM
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
## 2018-05-01 DIAGNOSIS — F17.210: ICD-10-CM
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
## 2018-06-03 ENCOUNTER — HOSPITAL ENCOUNTER (INPATIENT)
Dept: HOSPITAL 42 - ED | Age: 48
LOS: 11 days | Discharge: LEFT BEFORE DISCHARGE | DRG: 430 | End: 2018-06-14
Attending: PSYCHIATRY & NEUROLOGY | Admitting: PSYCHIATRY & NEUROLOGY
Payer: MEDICAID

## 2018-06-03 VITALS — BODY MASS INDEX: 34.5 KG/M2

## 2018-06-03 VITALS — OXYGEN SATURATION: 100 %

## 2018-06-03 DIAGNOSIS — F10.239: ICD-10-CM

## 2018-06-03 DIAGNOSIS — Z91.14: ICD-10-CM

## 2018-06-03 DIAGNOSIS — Z59.0: ICD-10-CM

## 2018-06-03 DIAGNOSIS — J98.11: ICD-10-CM

## 2018-06-03 DIAGNOSIS — R39.11: ICD-10-CM

## 2018-06-03 DIAGNOSIS — N40.1: ICD-10-CM

## 2018-06-03 DIAGNOSIS — F31.9: ICD-10-CM

## 2018-06-03 DIAGNOSIS — G47.00: ICD-10-CM

## 2018-06-03 DIAGNOSIS — F25.9: Primary | ICD-10-CM

## 2018-06-03 DIAGNOSIS — F14.10: ICD-10-CM

## 2018-06-03 DIAGNOSIS — F17.210: ICD-10-CM

## 2018-06-03 DIAGNOSIS — I10: ICD-10-CM

## 2018-06-03 DIAGNOSIS — Z79.82: ICD-10-CM

## 2018-06-03 DIAGNOSIS — G25.81: ICD-10-CM

## 2018-06-03 DIAGNOSIS — F11.20: ICD-10-CM

## 2018-06-03 DIAGNOSIS — F41.9: ICD-10-CM

## 2018-06-03 DIAGNOSIS — G89.29: ICD-10-CM

## 2018-06-03 LAB
ALBUMIN SERPL-MCNC: 4.7 G/DL (ref 3–4.8)
ALBUMIN/GLOB SERPL: 1.5 {RATIO} (ref 1.1–1.8)
ALT SERPL-CCNC: 63 U/L (ref 7–56)
APAP SERPL-MCNC: < 10 UG/ML (ref 10–20)
APPEARANCE UR: CLEAR
AST SERPL-CCNC: 39 U/L (ref 17–59)
BASOPHILS # BLD AUTO: 0.02 K/MM3 (ref 0–2)
BASOPHILS NFR BLD: 0.2 % (ref 0–3)
BILIRUB UR-MCNC: NEGATIVE MG/DL
BUN SERPL-MCNC: 8 MG/DL (ref 7–21)
CALCIUM SERPL-MCNC: 9.3 MG/DL (ref 8.4–10.5)
COLOR UR: YELLOW
EOSINOPHIL # BLD: 0.2 10*3/UL (ref 0–0.7)
EOSINOPHIL NFR BLD: 1.5 % (ref 1.5–5)
ERYTHROCYTE [DISTWIDTH] IN BLOOD BY AUTOMATED COUNT: 13.2 % (ref 11.5–14.5)
GFR NON-AFRICAN AMERICAN: > 60
GLUCOSE UR STRIP-MCNC: NEGATIVE MG/DL
GRANULOCYTES # BLD: 7.69 10*3/UL (ref 1.4–6.5)
GRANULOCYTES NFR BLD: 72.8 % (ref 50–68)
HGB BLD-MCNC: 16.1 G/DL (ref 14–18)
LEUKOCYTE ESTERASE UR-ACNC: NEGATIVE LEU/UL
LYMPHOCYTES # BLD: 2.2 10*3/UL (ref 1.2–3.4)
LYMPHOCYTES NFR BLD AUTO: 21.2 % (ref 22–35)
MCH RBC QN AUTO: 30.8 PG (ref 25–35)
MCHC RBC AUTO-ENTMCNC: 35.7 G/DL (ref 31–37)
MCV RBC AUTO: 86.4 FL (ref 80–105)
MONOCYTES # BLD AUTO: 0.5 10*3/UL (ref 0.1–0.6)
MONOCYTES NFR BLD: 4.3 % (ref 1–6)
PH UR STRIP: 5.5 [PH] (ref 4.7–8)
PLATELET # BLD: 205 10^3/UL (ref 120–450)
PMV BLD AUTO: 11.4 FL (ref 7–11)
PROT UR STRIP-MCNC: NEGATIVE MG/DL
RBC # BLD AUTO: 5.22 10^6/UL (ref 3.5–6.1)
RBC # UR STRIP: NEGATIVE /UL
SALICYLATES SERPL-MCNC: < 1 MG/DL (ref 2–20)
SP GR UR STRIP: >= 1.03 (ref 1–1.03)
T4 FREE SERPL-MCNC: 0.96 NG/DL (ref 0.78–2.19)
UROBILINOGEN UR STRIP-ACNC: 0.2 E.U./DL
WBC # BLD AUTO: 10.6 10^3/UL (ref 4.5–11)

## 2018-06-03 SDOH — ECONOMIC STABILITY - HOUSING INSECURITY: HOMELESSNESS: Z59.0

## 2018-06-03 NOTE — RAD
HISTORY:

Medical clearance  



COMPARISON:

Comparison chest 04/26/2018 



FINDINGS:



LUNGS:

Poor inspiration with low lung volumes, crowded bronchovascular 

markings and minor bibasilar atelectasis



PLEURA:

No significant pleural effusion identified, no pneumothorax apparent.



CARDIOVASCULAR:

Normal.



OSSEOUS STRUCTURES:

No significant abnormalities.



VISUALIZED UPPER ABDOMEN:

Normal.



OTHER FINDINGS:

None.



IMPRESSION:





Poor inspiration with low lung volumes, crowded bronchovascular 

markings and minor bibasilar atelectasis

## 2018-06-03 NOTE — PCM.BM
<Jennifer Corey - Last Filed: 06/04/18 09:15>





- Diagnosis


(1) Schizoaffective disorder


Status: Acute   


Interventions: 





06/04/18 09:15


Psychoeducation/psychotherapy


Psychopharmacology/adjustment of medications as needed/ monitoring possible 

side effects


Evaluate pt on daily basis


Compliance with medications and follow up appointments


Long acting medication if pt is noncompliant with pill form


Suicide and homicide risk assessment and prevention, coping strategies, safety 

plan


Relapse prevention


Reduction of symptoms


Improve functional status


Possible assertive community treatment


Cognitive behavioral therapy


Family involvement


Possible social skill training as outpatient








(2) Polysubstance abuse


Status: Acute   


Interventions: 





06/04/18 09:16


Monitoring withdrawal symptoms


Medical detoxification


Pharmacotherapy for alcohol/benzos/opioid dependence 


Maintaining sobriety


Relapse prevention


Possible rehabilitation


Motivational interviewing


12-step programs: AA meetings








<Cl Gramajo - Last Filed: 06/04/18 10:03>





Treatment Plan Problems





- Problems identified on initial assessmt


  ** Agitated Behavior


Date Initiated: 06/03/18


Time Initiated: 16:51


Assessment reference: NA


Status: Active


Priority: 1





  ** Altered Thought Process


Date Initiated: 06/03/18


Time Initiated: 16:51


Assessment reference: NA


Status: Active


Priority: 2





  ** Auditory Hallucinations


Date Initiated: 06/03/18


Time Initiated: 16:51


Assessment reference: NA


Status: Active


Priority: 3





  ** Delusions


Date Initiated: 06/03/18


Time Initiated: 16:51


Assessment reference: NA


Status: Active


Priority: 4





  ** Hopelessness


Date Initiated: 06/03/18


Time Initiated: 16:51


Assessment reference: NA


Status: Active


Priority: 5





  ** Altered Sleep Patterns


Date Initiated: 06/03/18


Time Initiated: 16:52


Assessment reference: NA


Status: Active


Priority: 6





Treatment assets and liabiliti


Patient Assests: cooperative, insightful, motivated, self-reliant, ADL 

independent, physically healthy, good support system, negotiates basic needs, 

cognitively intact


Patient Liabilities: substance abuse





- Milieu Protocol


Maintain good personal hygiene: every shift Encourage regular showers, every 

shift Remind patient to perform daily oral care, every shift Assist patient to 

perform ADL's


Conduct patient checks and document Observation sheet: Q15 minutes


Maintain personal safety: daily Educate patient to report safety concerns to 

staff, daily Monitor environment for contraband/sharps


Medication safety: Monitor for expected outcome, potential side effects: daily, 

Assess barriers to learning: daily, Assess readiness for medication education: 

daily





Family Contact


Family involvement: Family/SO is involved


Family contact: Patient agrees to contact





Discharge/Continuing Care





- Education Needs


Education Needs: Patient Medication, Patient Diagnosis/Disease Process, Patient 

Coping Skills, Patient Anger Management skills, Patient Aftercare Safety Plan





- Discharge


Discharge Criteria: Tolerates medication w/o severe side effects, Free of 

paranoid thoughts, Free of agitation, Normal sleep pattern, Ability to care for 

self, No longer exhibiting s/s of withdrawal, Reduction of target symptoms


Discharge to:: Home





<Keke Shin - Last Filed: 06/04/18 11:41>

## 2018-06-03 NOTE — ED PDOC
Arrival/HPI





- General


Historian: Patient





<Rodriguez Cash - Last Filed: 06/03/18 14:09>





<FannieMegan aleman - Last Filed: 06/15/18 13:37>





- General


Time Seen by Provider: 06/03/18 12:17





- History of Present Illness


Narrative History of Present Illness (Text): 





06/03/18 12:28


48 y/o male, psychiatric history including drug abuse/bipolar, nkda, c/o 

feeling anxious and depression with paranoid thoughts for over 1 week,  Pt. 

stated that he has drug abuse, feeling anxious, been more depress recently, 

associated with paranoid thought as he feels there is people tracking him down.

  Pt. has no homicidal or suicidal ideation, no auditory or visual hallucination

, no rash, no night sweat, no other medical or psychological complaints.  (Rodriguez Cash)





Past Medical History





- Provider Review


Nursing Documentation Reviewed: Yes





- Past History


Past History: No Previous





- Infectious Disease


Hx of Infectious Diseases: None





- Tetanus Immunization


Tetanus Immunization: Unknown





- Past Medical History


Past Medical History: No Previous





- Cardiac


Hx Hypertension: No





- Pulmonary


Hx Respiratory Disorders: No


Hx Tuberculosis: No





- Neurological


Hx Seizures: No





- HEENT


Hx HEENT Disorder: No





- Renal


Hx Renal Disorder: No





- Endocrine/Metabolic


Hx Endocrine Disorders: No





- Hematological/Oncological


Hx Blood Disorders: No





- Integumentary


Hx Dermatological Disorder: No (INCISION AND DRAINAGE TO BACK OF NECK AREA. 

CELLULITIS-)





- Musculoskeletal/Rheumatological


Hx Falls: No


Other/Comment: Brace to right leg, Hx. fasciectomy.





- Gastrointestinal


Hx Gastrointestinal Disorders: No





- Genitourinary/Gynecological


Hx Sexually Transmitted Diseases: No





- Psychiatric


Hx Anxiety: Yes


Hx Bipolar Disorder: Yes


Hx Depression: Yes


Hx Substance Use: No





- Past Surgical History


Past Surgical History: Non-Contributing





- Surgical History


Hx Orthopedic Surgery: Yes (Fasciotomy (R Leg))





- Anesthesia


Hx Anesthesia: Yes


Hx Anesthesia Reactions: No


Hx Malignant Hyperthermia: No





- Suicidal Assessment


Feels Threatened In Home Enviroment: No





<Rodriguez Cash - Last Filed: 06/03/18 14:09>





Family/Social History





- Physician Review


Nursing Documentation Reviewed: Yes


Family/Social History: Unknown Family HX


Smoking Status: Heavy Smoker > 10 Cigarettes Daily


Hx Alcohol Use: Yes


Hx Substance Use: No


Substance used: HEROIN, COCAINE, OXY


Hx Substance Use Treatment: No





<Rodriguez Cash JUAN - Last Filed: 06/03/18 14:09>





Allergies/Home Meds





<CashRodriguez JUAN - Last Filed: 06/03/18 14:09>





<Megan Greco - Last Filed: 06/15/18 13:37>


Allergies/Adverse Reactions: 


Allergies





ct dye Allergy (Uncoded 06/04/18 10:22)


 ITCHING


 AS PER PATIENT  











Review of Systems





- Review of Systems


Constitutional: absent: Fatigue, Fevers


Eyes: absent: Vision Changes


ENT: absent: Hearing Changes


Respiratory: absent: SOB, Cough


Cardiovascular: absent: Chest Pain


Gastrointestinal: absent: Abdominal Pain, Nausea, Vomiting


Skin: absent: Rash, Pruritis


Neurological: absent: Headache


Psychiatric: Anxiety, Depression, Other (paranoid thoughts).  absent: Suicidal 

Ideation





<Rodriguez Cash - Last Filed: 06/03/18 14:09>





Physical Exam





- Systems Exam


Head: Present: Atraumatic, Normocephalic


Pupils: Present: PERRL


Extroacular Muscles: Present: EOMI


Conjunctiva: Present: Normal


Mouth: Present: Moist Mucous Membranes


Neck: Present: Normal Range of Motion


Respiratory/Chest: Present: Clear to Auscultation, Good Air Exchange.  No: 

Respiratory Distress, Accessory Muscle Use


Cardiovascular: Present: Regular Rate and Rhythm, Normal S1, S2.  No: Murmurs


Abdomen: No: Tenderness, Distention, Peritoneal Signs


Back: Present: Normal Inspection


Upper Extremity: Present: Normal Inspection.  No: Cyanosis, Edema


Lower Extremity: Present: Normal Inspection.  No: Edema


Neurological: Present: GCS=15, CN II-XII Intact, Speech Normal


Skin: Present: Warm, Dry, Normal Color.  No: Rashes


Psychiatric: Present: Alert, Oriented x 3, Normal Insight, Normal Concentration

, Anxious, Depressed Mood





<ShreyaRodriguez MARTINEZ - Last Filed: 06/03/18 14:09>





Vital Signs











  Temp Pulse Resp BP Pulse Ox


 


 06/03/18 14:30  98.2 F  80  18  127/76  99


 


 06/03/18 12:38  98.4 F  87  18  152/85 H  98














Medical Decision Making





- RAD Interpretation


: Radiologist





- EKG Interpretation


Interpreted by ED Physician: Yes


Type: 12 lead EKG





<Rodriguez Cash - Last Filed: 06/03/18 14:09>





<Megan Greco - Last Filed: 06/15/18 13:37>


ED Course and Treatment: 





06/03/18 12:29


-labs/ua/uds


-chest xray


-PES paged and pending for arrival


-Observe and reassess





06/03/18 13:44


-Pt. feels anxious, xanax 0.5mg po ordered





06/03/18 14:09


-EKG: NSR @ 93 BPM, no ST elevation or depression, no T wave inversion. .


-Chest xray poor inspiration with low lung volumes, crowded bronchovascular 

markings and minor bibasilar atelectasis


-Labs show no acute findings


-UA show no UTI


-UDS show +cocaine/opiate


-Pt. is medically clear and stable for psychiatric evaluation. 


-Pt. evaluated by the PES screening Anaise, evaluated the patient and discussed 

with the psychiatrist on call, suggest to admit the patient for bipolar and 

drug abuse, pt. agreed to be admitted to Dr. Jennifer TALAMANTES's service. 


-I discussed with Dr. Greco and he will put in the admission order. (Rodriguez Cash)





- Lab Interpretations


Lab Results: 











 06/03/18 11:34 





 06/03/18 11:34 





 Lab Results





06/03/18 13:15: Urine Opiates Screen Positive H, Urine Methadone Screen Negative

, Ur Barbiturates Screen Negative, Ur Phencyclidine Scrn Negative, Ur 

Amphetamines Screen Negative, U Benzodiazepines Scrn Positive H, U Oth Cocaine 

Metabols Positive H, U Cannabinoids Screen Negative


06/03/18 13:15: Urine Color Yellow, Urine Appearance Clear, Urine pH 5.5, Ur 

Specific Gravity >= 1.030, Urine Protein Negative, Urine Glucose (UA) Negative, 

Urine Ketones Negative, Urine Blood Negative, Urine Nitrate Negative, Urine 

Bilirubin Negative, Urine Urobilinogen 0.2, Ur Leukocyte Esterase Negative


06/03/18 11:34: Alcohol, Quantitative < 10


06/03/18 11:34: WBC 10.6  D, RBC 5.22, Hgb 16.1  D, Hct 45.1, MCV 86.4, MCH 30.8

, MCHC 35.7, RDW 13.2, Plt Count 205, MPV 11.4 H, Gran % 72.8 H, Lymph % (Auto) 

21.2 L, Mono % (Auto) 4.3, Eos % (Auto) 1.5, Baso % (Auto) 0.2, Gran # 7.69 H, 

Lymph # (Auto) 2.2, Mono # (Auto) 0.5, Eos # (Auto) 0.2, Baso # (Auto) 0.02


06/03/18 11:34: Salicylates < 1 L, Acetaminophen < 10.0 L


06/03/18 11:34: Sodium 145, Potassium 4.2, Chloride 109 H, Carbon Dioxide 23, 

Anion Gap 18, BUN 8, Creatinine 0.8, Est GFR (African Amer) > 60, Est GFR (Non-

Af Amer) > 60, Random Glucose 99, Calcium 9.3, Total Bilirubin 0.4, AST 39, ALT 

63 H, Alkaline Phosphatase 103, Total Protein 8.0, Albumin 4.7, Globulin 3.2, 

Albumin/Globulin Ratio 1.5











- RAD Interpretation


Radiology Orders: 











06/03/18 12:51


CHEST PORTABLE [RAD] Stat 








HISTORY:


Medical clearance  





COMPARISON:


Comparison chest 04/26/2018 





FINDINGS:





LUNGS:


Poor inspiration with low lung volumes, crowded bronchovascular markings and 

minor bibasilar atelectasis





PLEURA:


No significant pleural effusion identified, no pneumothorax apparent.





CARDIOVASCULAR:


Normal.





OSSEOUS STRUCTURES:


No significant abnormalities.





VISUALIZED UPPER ABDOMEN:


Normal.





OTHER FINDINGS:


None.





IMPRESSION:








Poor inspiration with low lung volumes, crowded bronchovascular markings and 

minor bibasilar atelectasis (Rodriguez Cash)





- EKG Interpretation


EKG Interpretation (Text): 





06/03/18 12:46


-EKG: NSR @ 93 BPM, no ST elevation or depression, no T wave inversion.  (Rodriguez Cash)





- Medication Orders


Current Medication Orders: 














Discontinued Medications





Acetaminophen (Tylenol 325mg Tab)  650 mg PO Q4 PRN


   PRN Reason: Pain, Mild (1-3)


   Last Admin: 06/13/18 21:16  Dose: 650 mg





MAR Pain/Vitals


 Document     06/13/18 21:16  DC  (Rec: 06/13/18 21:17  DC  [id number])


     Pain Reassessment


      Is This A Pain ReAssessment?               No


     Sleep


      Is patient sleeping during reassessment?   No


     Presence of Pain


      Presence of Pain                           Yes


     Pain Scale Used


      Pain Scale Used                            Numeric


     Location


      Left, Right or Bilateral                   Right


      Upper or Lower                             Lower


      Pain Location Body Site                    Calf


      Description                                Burning


      Intensity                                  3


      Scale Used                                 Numeric


      Pain Behavior                              Irritability


      Aggravating Factors                        ADL's


      Alleviating Factors                        Medication


Re-Assess: MAR Pain/Vitals


 Document     06/13/18 22:16  DC  (Rec: 06/14/18 00:30  DC  [id number])


     Pain Reassessment


      Is This A Pain ReAssessment?               Yes


     Sleep


      Is patient sleeping during reassessment?   No


     Presence of Pain


      Presence of Pain                           No


     Pain Scale Used


      Pain Scale Used                            Numeric


     Location


      Left, Right or Bilateral                   Right


      Upper or Lower                             Lower


      Pain Location Body Site                    Calf


      Intensity                                  0





Al Hydrox/Mg Hydrox/Simethicone (Maalox Plus 30 Ml)  30 ml PO DAILY PRN


   PRN Reason: Upset Stomach


Alprazolam (Xanax)  0.5 mg PO STAT STA


   PRN Reason: Protocol


   Stop: 06/03/18 13:45


   Last Admin: 06/03/18 13:55  Dose: 0.5 mg





Re-Assess: Reassess Psych Meds


 Document     06/03/18 14:55  TW  (Rec: 06/03/18 15:47  TW  [id number])


      Reassess Psych Med                         Ineffective-LIP notifed





Alprazolam (Xanax)  1 mg PO QID HOME


   PRN Reason: Protocol


   Last Admin: 06/09/18 07:49  Dose: 1 mg





Behavioural


 Document     06/09/18 07:49  JOANNA  (Rec: 06/09/18 07:49  JOANNA  [id number])


     Maintenance


      Maintenance Dose                           Yes


Re-Assess: Reassess Psych Meds


 Document     06/09/18 08:49  JOANNA  (Rec: 06/09/18 09:41  JOANNA  [id number])


      Reassess Psych Med                         Effective





Benztropine Mesylate (Cogentin)  2 mg PO AMHS HOME


   Last Admin: 06/14/18 09:01  Dose: 2 mg





Chlorpromazine (Thorazine)  50 mg PO QID PRN; Protocol


   PRN Reason: agitation/psychosis


   Last Admin: 06/12/18 16:09  Dose: 50 mg





Behavioural


 Document     06/12/18 16:09  CV  (Rec: 06/12/18 16:10  CV  [id number])


     Maintenance


      Maintenance Dose                           No


     Nonmedicinal


      Nonmedicinal Interventions                 Therapeutic Communication


                                                 Activity


     Behavior


      Behavior for Medication:                   Anxiety


Re-Assess: Reassess Psych Meds


 Document     06/12/18 17:09  CV  (Rec: 06/12/18 20:22  CV  [id number])


      Reassess Psych Med                         Effective





Chlorpromazine (Thorazine)  50 mg IM QID PRN; Protocol


   PRN Reason: Agitation


   Last Admin: 06/13/18 21:52  Dose: 50 mg





IM Administration Charges


 Document     06/13/18 21:52  DC  (Rec: 06/13/18 21:53  DC  [id number])


     Injection Site


      MAR Injection Site                         Right Deltoid


     Charges for Administration


      # of IM Administrations                    1


Behavioural


 Document     06/13/18 21:52  DC  (Rec: 06/13/18 21:53  DC  [id number])


     Maintenance


      Maintenance Dose                           No


     Nonmedicinal


      Nonmedicinal Interventions                 Redirect


     Behavior


      Behavior for Medication:                   Continuous pacing/restlessness


Re-Assess: Behavioural


 Document     06/13/18 22:22  DC  (Rec: 06/14/18 00:30  DC  [id number])


     Maintenance


      Maintenance Dose                           No


     Nonmedicinal


      Nonmedicinal Interventions                 Redirect


     Behavior


      Behavior for Medication:                   Continuous pacing/restlessness





Clonazepam (Klonopin)  1 mg PO TID HOME


   PRN Reason: Protocol


Clonazepam (Klonopin)  2 mg PO TID HOME


   PRN Reason: Protocol


   Last Admin: 06/05/18 09:09  Dose: 2 mg





Behavioural


 Document     06/05/18 09:09  ABO  (Rec: 06/05/18 09:10  ABO  [id number])


     Maintenance


      Maintenance Dose                           Yes


Re-Assess: Reassess Psych Meds


 Document     06/05/18 10:09  ABO  (Rec: 06/05/18 11:40  ABO  [id number])


      Reassess Psych Med                         Effective





Clonazepam (Klonopin)  2 mg PO QID HOME


   PRN Reason: Protocol


   Last Admin: 06/07/18 13:55  Dose: 2 mg





Behavioural


 Document     06/07/18 13:55  ABO  (Rec: 06/07/18 13:55  ABO  [id number])


     Maintenance


      Maintenance Dose                           Yes


Re-Assess: Reassess Psych Meds


 Document     06/07/18 14:55  ABO  (Rec: 06/07/18 15:24  ABO  [id number])


      Reassess Psych Med                         Ineffective-LIP notifed





Clonidine HCl (Catapres)  0.1 mg PO Q12H PRN


   PRN Reason: Other


   Last Admin: 06/14/18 11:41  Dose: 0.1 mg





Diphenhydramine HCl (Benadryl)  50 mg PO Q6H PRN


   PRN Reason: for agitation


   Last Admin: 06/08/18 14:14  Dose: 50 mg





Diphenhydramine HCl (Benadryl)  50 mg IM Q6H PRN


   PRN Reason: Agitation


   Last Admin: 06/09/18 23:27  Dose: 50 mg





IM Administration Charges


 Document     06/09/18 23:27  DC  (Rec: 06/09/18 23:27  DC  [id number])


     Injection Site


      MAR Injection Site                         Right Deltoid


     Charges for Administration


      # of IM Administrations                    1





Divalproex Sodium (Depakote Dr (*Bid*))  250 mg PO AMHS HOME


   PRN Reason: Protocol


   Last Admin: 06/13/18 09:10  Dose: 250 mg





Behavioural


 Document     06/13/18 09:10  TW  (Rec: 06/13/18 09:10  [id number]


     Maintenance


      Maintenance Dose                           Yes


Re-Assess: Reassess Psych Meds


 Document     06/13/18 10:10  TW  (Rec: 06/13/18 10:15    [id number])


      Reassess Psych Med                         Effective





Divalproex Sodium (Depakote Dr(*Bid*))  500 mg PO AMHS HOME


   PRN Reason: Protocol


   Last Admin: 06/14/18 09:03  Dose: 500 mg





Fluoxetine HCl (Prozac)  10 mg PO DAILY HOME


   Last Admin: 06/08/18 08:35  Dose: 10 mg





Fluoxetine HCl (Prozac)  20 mg PO DAILY [facility]


   Last Admin: 06/12/18 11:15 Dose:  Not Given


   Non-Admin Reason: Patient Asleep





Fluoxetine HCl (Prozac)  20 mg PO DAILY [facility]


   Last Admin: 06/13/18 09:09  Dose: 20 mg





Fluoxetine HCl (Prozac)  10 mg PO DAILY HOME


   Last Admin: 06/13/18 09:10  Dose: 10 mg





Fluoxetine HCl (Prozac)  40 mg PO DAILY HOME


Gabapentin (Neurontin)  300 mg PO TID HOME


   PRN Reason: Protocol


   Last Admin: 06/04/18 13:18  Dose: 300 mg





Behavioural


 Document     06/04/18 13:18  TW  (Rec: 06/04/18 13:18    [id number])


     Maintenance


      Maintenance Dose                           Yes


Re-Assess: Reassess Psych Meds


 Document     06/04/18 14:18  TW  (Rec: 06/04/18 15:05  TW  [id number])


      Reassess Psych Med                         Effective





Gabapentin (Neurontin)  600 mg PO TID HOME


   PRN Reason: Protocol


   Last Admin: 06/07/18 12:09  Dose: 600 mg





Behavioural


 Document     06/07/18 12:09  CV  (Rec: 06/07/18 12:09  CV  [id number])


     Maintenance


      Maintenance Dose                           Yes


     Nonmedicinal


      Nonmedicinal Interventions                 Therapeutic Communication


Re-Assess: Reassess Psych Meds


 Document     06/07/18 13:09  ABO  (Rec: 06/07/18 13:53  ABO  [id number])


      Reassess Psych Med                         Effective





Gabapentin (Neurontin)  600 mg PO QID HOME


   PRN Reason: Protocol


   Last Admin: 06/14/18 08:09  Dose: 600 mg





Haloperidol (Haldol)  5 mg PO Q6H PRN; Protocol


   PRN Reason: Agitation


   Last Admin: 06/05/18 03:35  Dose: 5 mg





Behavioural


 Document     06/05/18 03:35  DC  (Rec: 06/05/18 03:35  DC  [id number])


     Maintenance


      Maintenance Dose                           No


     Nonmedicinal


      Nonmedicinal Interventions                 Redirect


     Behavior


      Behavior for Medication:                   Continuous pacing/restlessness


Re-Assess: Reassess Psych Meds


 Document     06/05/18 04:35  DC  (Rec: 06/05/18 06:37  DC  [id number])


      Reassess Psych Med                         Effective





Haloperidol (Haldol)  5 mg PO Q6H PRN; Protocol


   PRN Reason: restlessness and agitation


   Last Admin: 06/08/18 13:06  Dose: 5 mg





Behavioural


 Document     06/08/18 13:06  ABO  (Rec: 06/08/18 13:06  ABO  [id number])


     Maintenance


      Maintenance Dose                           No


     Nonmedicinal


      Nonmedicinal Interventions                 See nurse's notes


     Behavior


      Behavior for Medication:                   Hallucinations/paranoid/


                                                 delusions/extreme fear


Re-Assess: Reassess Psych Meds


 Document     06/08/18 14:06  ABO  (Rec: 06/08/18 14:35  ABO  [id number])


      Reassess Psych Med                         Effective





Haloperidol Lactate (Haldol)  5 mg IM Q6H PRN; Protocol


   PRN Reason: Agitation


   Last Admin: 06/07/18 21:11  Dose: 5 mg





IM Administration Charges


 Document     06/07/18 21:11  KM  (Rec: 06/07/18 21:12  KM  [id number])


     Injection Site


      MAR Injection Site                         Left Deltoid


     Charges for Administration


      # of IM Administrations                    1


Behavioural


 Document     06/07/18 21:11  KM  (Rec: 06/07/18 21:12  KM  [place] [id number])


     Nonmedicinal


      Nonmedicinal Interventions                 Therapeutic Communication


     Behavior


      Behavior for Medication:                   Anxiety


                                                 Continuous pacing/restlessness


Re-Assess: Reassess Psych Meds


 Document     06/07/18 22:11  KM  (Rec: 06/07/18 23:54  KM  [place] [id number])


      Reassess Psych Med                         Effective





Hydroxyzine Pamoate (Vistaril)  50 mg PO Q8 PRN; Protocol


   PRN Reason: Anxiety


   Last Admin: 06/09/18 16:00  Dose: 50 mg





Behavioural


 Document     06/09/18 16:00  JOANNA  (Rec: 06/09/18 16:00  JOANNA  [id number])


     Maintenance


      Maintenance Dose                           No


     Nonmedicinal


      Nonmedicinal Interventions                 See nurse's notes


     Behavior


      Behavior for Medication:                   Anxiety


Re-Assess: Reassess Psych Meds


 Document     06/09/18 17:00  JOANNA  (Rec: 06/09/18 17:39  JOANNA  [id number])


      Reassess Psych Med                         Effective





Lorazepam (Ativan)  2 mg PO Q6H PRN; Protocol


   PRN Reason: Agitation


   Last Admin: 06/07/18 12:10  Dose: 2 mg





Behavioural


 Document     06/07/18 12:10  CV  (Rec: 06/07/18 12:10  CV  [place] [id number])


     Maintenance


      Maintenance Dose                           No


     Nonmedicinal


      Nonmedicinal Interventions                 Therapeutic Communication


                                                 Activity


     Behavior


      Behavior for Medication:                   Anxiety


Re-Assess: Reassess Psych Meds


 Document     06/07/18 13:10  ABO  (Rec: 06/07/18 13:53  ABO  [id number])


      Reassess Psych Med                         Effective





Lorazepam (Ativan)  2 mg IM Q6H PRN; Protocol


   PRN Reason: Agitation


   Last Admin: 06/03/18 16:00  Dose: 2 mg





IM Administration Charges


 Document     06/03/18 16:00  TW  (Rec: 06/03/18 16:00  TW  [id number])


     Injection Site


      MAR Injection Site                         Right Deltoid


     Charges for Administration


      # of IM Administrations                    1


Behavioural


 Document     06/03/18 16:00  TW  (Rec: 06/03/18 16:00  TW  [id number])


     Maintenance


      Maintenance Dose                           No


     Nonmedicinal


      Nonmedicinal Interventions                 Therapeutic Communication


     Behavior


      Behavior for Medication:                   Anxiety


                                                 Continuous pacing/restlessness


                                                 Hallucinations/paranoid/


                                                 delusions/extreme fear


Re-Assess: Reassess Psych Meds


 Document     06/03/18 16:30  TW  (Rec: 06/03/18 16:59  TW  [id number])


      Reassess Psych Med                         Effective





Lorazepam (Ativan)  2 mg PO Q6H HOME


   PRN Reason: Protocol


   Last Admin: 06/04/18 13:18  Dose: 2 mg





Behavioural


 Document     06/04/18 13:18  TW  (Rec: 06/04/18 13:18  TW  [id number])


     Maintenance


      Maintenance Dose                           Yes


Re-Assess: Reassess Psych Meds


 Document     06/04/18 14:18  TW  (Rec: 06/04/18 15:05  [id number]


      Reassess Psych Med                         Effective





Lorazepam (Ativan)  3 mg PO Q6H HOME


   PRN Reason: Protocol


   Last Admin: 06/08/18 09:53  Dose: 3 mg





Re-Assess: Reassess Psych Meds


 Document     06/08/18 10:53  ABO  (Rec: 06/08/18 11:04  ABO  [id number])


      Reassess Psych Med                         Effective





Lorazepam (Ativan)  2 mg PO Q6H HOME


   PRN Reason: Protocol


   Last Admin: 06/08/18 13:05  Dose: 2 mg





Behavioural


 Document     06/08/18 13:05  ABO  (Rec: 06/08/18 13:06  ABO  [id number])


     Maintenance


      Maintenance Dose                           Yes


Re-Assess: Reassess Psych Meds


 Document     06/08/18 14:05  ABO  (Rec: 06/08/18 14:36  ABO  [id number])


      Reassess Psych Med                         Effective





Lorazepam (Ativan)  2 mg PO ONCE ONE


   PRN Reason: Protocol


   Stop: 06/08/18 23:35


   Last Admin: 06/08/18 23:39  Dose: 2 mg





Behavioural


 Document     06/08/18 23:39  KM  (Rec: 06/08/18 23:40  KM  [id number])


     Nonmedicinal


      Nonmedicinal Interventions                 See nurse's notes


     Behavior


      Behavior for Medication:                   Anxiety


                                                 Continuous pacing/restlessness


Re-Assess: Reassess Psych Meds


 Document     06/09/18 00:39  KM  (Rec: 06/09/18 00:52  KM  [id number])


      Reassess Psych Med                         Effective





Lorazepam (Ativan)  2 mg PO Q6 HOME


   PRN Reason: Protocol


   Last Admin: 06/09/18 17:43  Dose: 2 mg





Behavioural


 Document     06/09/18 17:43  JOANNA  (Rec: 06/09/18 17:44  JOANNA  [id number])


     Maintenance


      Maintenance Dose                           Yes


Re-Assess: Reassess Psych Meds


 Document     06/09/18 18:43  JOANNA  (Rec: 06/09/18 19:17  JOANNA  [id number])


      Reassess Psych Med                         Effective





Lorazepam (Ativan)  2 mg PO Q6 HOME


   PRN Reason: Protocol


   Last Admin: 06/10/18 12:50  Dose: 2 mg





Behavioural


 Document     06/10/18 12:50  TW  (Rec: 06/10/18 12:51  TW  [id number])


     Maintenance


      Maintenance Dose                           Yes


Re-Assess: Reassess Psych Meds


 Document     06/10/18 13:50  TW  (Rec: 06/10/18 14:33  TW  [id number])


      Reassess Psych Med                         Effective





Lorazepam (Ativan)  2 mg PO Q8 HOME


   PRN Reason: Protocol


   Last Admin: 06/11/18 15:46  Dose: 2 mg





Behavioural


 Document     06/11/18 15:46  CV  (Rec: 06/11/18 15:47  CV  [id number])


     Maintenance


      Maintenance Dose                           Yes


     Nonmedicinal


      Nonmedicinal Interventions                 Therapeutic Communication


Re-Assess: Reassess Psych Meds


 Document     06/11/18 16:46  CV  (Rec: 06/11/18 17:29  CV  [id number])


      Reassess Psych Med                         Effective





Lorazepam (Ativan)  2 mg IM STAT STA


   PRN Reason: Protocol


   Stop: 06/11/18 09:56


   Last Admin: 06/11/18 10:09  Dose: 2 mg





IM Administration Charges


 Document     06/11/18 10:09  CV  (Rec: 06/11/18 10:10  CV  [id number])


     Charges for Administration


      # of IM Administrations                    1


Behavioural


 Document     06/11/18 10:09  CV  (Rec: 06/11/18 10:10  CV  [id number])


     Maintenance


      Maintenance Dose                           No


     Nonmedicinal


      Nonmedicinal Interventions                 Redirect


     Behavior


      Behavior for Medication:                   Anxiety


                                                 Continuous crying/screaming/


                                                 yelling


                                                 Hallucinations/paranoid/


                                                 delusions/extreme fear


Re-Assess: Reassess Psych Meds


 Document     06/11/18 10:39  CV  (Rec: 06/11/18 11:48  CV  [id number])


      Reassess Psych Med                         Effective





Lorazepam (Ativan)  1 mg PO QID HOME


   PRN Reason: Protocol


Lorazepam (Ativan)  1 mg PO QID OHME


   PRN Reason: Protocol


   Last Admin: 06/12/18 12:04  Dose: 1 mg





Re-Assess: Reassess Psych Meds


 Document     06/12/18 13:04  CV  (Rec: 06/12/18 13:51  CV  [id number])


      Reassess Psych Med                         Effective





Lorazepam (Ativan)  1 mg PO TID HOME


   PRN Reason: Protocol


   Last Admin: 06/13/18 13:26  Dose: 1 mg





Behavioural


 Document     06/13/18 13:26  TW  (Rec: 06/13/18 13:26  [id number]


     Maintenance


      Maintenance Dose                           Yes


Re-Assess: Reassess Psych Meds


 Document     06/13/18 14:26  TW  (Rec: 06/13/18 14:43  TW  [id number])


      Reassess Psych Med                         Effective





Lorazepam (Ativan)  1 mg PO BID HOME


   PRN Reason: Protocol


   Last Admin: 06/14/18 08:08  Dose: 1 mg





Magnesium Hydroxide (Milk Of Magnesia)  30 ml PO DAILY PRN


   PRN Reason: Constipation


Meloxicam (Mobic)  7.5 mg PO DAILY [facility]


   Last Admin: 06/14/18 08:08  Dose: 7.5 mg





MAR Pain Assessment


 Document     06/14/18 08:08  AK  (Rec: 06/14/18 08:08  [facility]  [id number])


     Pain Reassessment


      Is this a pain reassessment?               Yes





Propranolol HCl (Inderal)  10 mg PO BID [facility]


   Last Admin: 06/14/18 08:09  Dose: 10 mg





Quetiapine Fumarate (Seroquel)  50 mg PO BID HOME


   PRN Reason: Protocol


   Last Admin: 06/04/18 07:08  Dose: 50 mg





Behavioural


 Document     06/04/18 07:08  EOO  (Rec: 06/04/18 07:08  EOO  [id number])


     Maintenance


      Maintenance Dose                           Yes


     Nonmedicinal


      Nonmedicinal Interventions                 Therapeutic Communication


     Behavior


      Behavior for Medication:                   Hallucinations/paranoid/


                                                 delusions/extreme fear


Re-Assess: Reassess Psych Meds


 Document     06/04/18 08:08  TW  (Rec: 06/04/18 08:26  TW  [id number])


      Reassess Psych Med                         Effective





Quetiapine Fumarate (Seroquel)  100 mg PO HS HOME


   PRN Reason: Protocol


   Last Admin: 06/03/18 23:45 Dose:  Not Given


   Non-Admin Reason: Patient Asleep





Quetiapine Fumarate (Seroquel)  200 mg PO HS HOME


   PRN Reason: Protocol


   Last Admin: 06/05/18 22:31  Dose: 200 mg





Behavioural


 Document     06/05/18 22:31  DC  (Rec: 06/05/18 22:31  DC  [id number])


     Maintenance


      Maintenance Dose                           Yes


Re-Assess: Reassess Psych Meds


 Document     06/05/18 23:31  DC  (Rec: 06/06/18 04:29  DC  [id number])


      Reassess Psych Med                         Effective





Quetiapine Fumarate (Seroquel)  100 mg PO BID [facility]


   PRN Reason: Protocol


   Last Admin: 06/06/18 08:51  Dose: 100 mg





Quetiapine Fumarate (Seroquel)  200 mg PO DAILY HOME


   PRN Reason: Protocol


   Last Admin: 06/07/18 08:44  Dose: 200 mg





Behavioural


 Document     06/07/18 08:44  ABO  (Rec: 06/07/18 08:44  ABO  [id number])


     Maintenance


      Maintenance Dose                           Yes


Re-Assess: Reassess Psych Meds


 Document     06/07/18 09:44  ABO  (Rec: 06/07/18 10:22  ABO  [id number])


      Reassess Psych Med                         Effective





Quetiapine Fumarate (Seroquel)  300 mg PO HS HOME


   PRN Reason: Protocol


   Last Admin: 06/08/18 21:08  Dose: 300 mg





Re-Assess: Reassess Psych Meds


 Document     06/08/18 22:08  KM  (Rec: 06/08/18 23:06  KM  [id number])


      Reassess Psych Med                         Effective





Quetiapine Fumarate (Seroquel)  300 mg PO DAILY HOME


   PRN Reason: Protocol


   Last Admin: 06/12/18 11:15 Dose:  Not Given


   Non-Admin Reason: Patient Asleep





Quetiapine Fumarate (Seroquel)  350 mg PO HS HOME


   PRN Reason: Protocol


   Last Admin: 06/11/18 22:30  Dose: 350 mg





Behavioural


 Document     06/11/18 22:30  DC  (Rec: 06/11/18 22:31  DC  [id number])


     Maintenance


      Maintenance Dose                           Yes


Re-Assess: Reassess Psych Meds


 Document     06/11/18 23:30  DC  (Rec: 06/11/18 23:38  DC  [id number])


      Reassess Psych Med                         Effective





Quetiapine Fumarate (Seroquel)  400 mg PO HS HOME


   PRN Reason: Protocol


   Last Admin: 06/13/18 21:14  Dose: 400 mg





Behavioural


 Document     06/13/18 21:14  DC  (Rec: 06/13/18 21:15  DC  [id number])


     Maintenance


      Maintenance Dose                           Yes


Re-Assess: Reassess Psych Meds


 Document     06/13/18 22:14  DC  (Rec: 06/14/18 00:29  DC  [id number])


      Reassess Psych Med                         Effective





Quetiapine Fumarate (Seroquel)  400 mg PO DAILY [facility]


   PRN Reason: Protocol


   Last Admin: 06/13/18 09:10  Dose: 400 mg





Behavioural


 Document     06/13/18 09:10  TW  (Rec: 06/13/18 09:10  TW  [id number])


     Maintenance


      Maintenance Dose                           Yes


Re-Assess: Reassess Psych Meds


 Document     06/13/18 10:10  TW  (Rec: 06/13/18 10:16  TW  [id number])


      Reassess Psych Med                         Effective





Tamsulosin HCl (Flomax)  0.4 mg PO DAILY [facility]


   Last Admin: 06/14/18 08:08  Dose: 0.4 mg





Tramadol HCl (Ultram)  50 mg PO TID [facility]


   Last Admin: 06/06/18 12:57  Dose: 50 mg





MAR Pain Assessment


 Document     06/06/18 12:57  RGO  (Rec: 06/06/18 12:57  RGO  [id number])


     Pain Reassessment


      Is this a pain reassessment?               No


     Sleep


      Is patient sleeping during reassessment?   No


     Presence of Pain


      Presence of Pain                           Yes


     Pain Scale Used


      Pain Scale Used                            Numeric


     Location


      Pain Location Body Site                    Generalized


     Description


      Description                                Acute


      Intensity of Pain at present               8


      Pain Behavior                              Irritability


      Aggravating Factors                        ADL's


      Alleviating Factors/Management             Medication


       Techniques                                


      Alleviating Factors                        Medication





Tramadol HCl (Ultram)  50 mg PO BID [facility]


   Last Admin: 06/07/18 08:43  Dose: 50 mg





MAR Pain Assessment


 Document     06/07/18 08:43  ABO  (Rec: 06/07/18 08:43  ABO  [id number])


     Pain Reassessment


      Is this a pain reassessment?               No


     Sleep


      Is patient sleeping during reassessment?   No


     Presence of Pain


      Presence of Pain                           Yes


Re-Assess: MAR Pain Assessment


 Document     06/07/18 09:43  ABO  (Rec: 06/07/18 10:23  ABO  [id number])


     Pain Reassessment


      Is this a pain reassessment?               Yes


     Sleep


      Is patient sleeping during reassessment?   No


     Presence of Pain


      Presence of Pain                           No





Tramadol HCl (Ultram)  50 mg PO TID [facility]


   Last Admin: 06/08/18 08:35  Dose: 50 mg





MAR Pain Assessment


 Document     06/08/18 08:35  ABO  (Rec: 06/08/18 08:35  ABO  [id number])


     Pain Reassessment


      Is this a pain reassessment?               No


     Sleep


      Is patient sleeping during reassessment?   No


Re-Assess: MAR Pain Assessment


 Document     06/08/18 09:35  ABO  (Rec: 06/08/18 11:02  ABO  [id number])


     Pain Reassessment


      Is this a pain reassessment?               Yes


     Sleep


      Is patient sleeping during reassessment?   No


     Presence of Pain


      Presence of Pain                           No


     Pain Scale Used


      Pain Scale Used                            Numeric





Tramadol HCl (Ultram)  50 mg PO TID PRN


   PRN Reason: pain>=7/10


   Last Admin: 06/11/18 11:44  Dose: 50 mg





MAR Pain Assessment


 Document     06/11/18 11:44  CV  (Rec: 06/11/18 11:45  CV  [id number])


     Pain Reassessment


      Is this a pain reassessment?               No


     Sleep


      Is patient sleeping during reassessment?   No


     Presence of Pain


      Presence of Pain                           Yes


     Pain Scale Used


      Pain Scale Used                            Numeric


     Location


      Left, Right or Bilateral                   Right


      Pain Location Body Site                    Leg


     Description


      Description                                Intermittent


      Intensity of Pain at present               6


      Acceptable Level of Pain                   2


      Pain Behavior                              Moaning


      Aggravating Factors                        Standing


      Alleviating Factors/Management             Medication


       Techniques                                


      Alleviating Factors                        Medication


Re-Assess: MAR Pain Assessment


 Document     06/11/18 12:44  CV  (Rec: 06/11/18 13:34  CV  [id number])


     Pain Reassessment


      Is this a pain reassessment?               Yes


     Sleep


      Is patient sleeping during reassessment?   No


     Presence of Pain


      Presence of Pain                           Yes


     Pain Scale Used


      Pain Scale Used                            Numeric


     Location


      Left, Right or Bilateral                   Right


      Pain Location Body Site                    Leg


     Description


      Description                                Intermittent


      Intensity of Pain at present               2


      Aggravating Factors                        Standing


      Alleviating Factors/Management             Medication


       Techniques                                


      Alleviating Factors                        Medication





Tramadol HCl (Ultram)  50 mg PO BID PRN


   PRN Reason: pain>=7/10


   Last Admin: 06/13/18 09:32  Dose: 50 mg





MAR Pain Assessment


 Document     06/13/18 09:32  TW  (Rec: 06/13/18 09:32  TW  [id number])


     Pain Reassessment


      Is this a pain reassessment?               Yes


     Presence of Pain


      Presence of Pain                           Yes


     Pain Scale Used


      Pain Scale Used                            Numeric


     Location


      Pain Location Body Site                    Back


     Description


      Description                                Intermittent


      Intensity of Pain at present               7


Re-Assess: MAR Pain Assessment


 Document     06/13/18 10:32  TW  (Rec: 06/13/18 11:12  TW  [id number])


     Pain Reassessment


      Is this a pain reassessment?               Yes


     Presence of Pain


      Presence of Pain                           Yes


     Pain Scale Used


      Pain Scale Used                            Numeric


     Location


      Pain Location Body Site                    Back


     Description


      Description                                Burning


      Intensity of Pain at present               3





Tramadol HCl (Ultram)  50 mg PO DAILY PRN


   PRN Reason: pain>=7/10


   Last Admin: 06/14/18 09:08  Dose: 50 mg





MAR Pain Assessment


 Document     06/14/18 09:08  [facility]  (Rec: 06/14/18 09:08  [facility]  [id number])


     Pain Reassessment


      Is this a pain reassessment?               Yes





Zaleplon (Sonata)  10 mg PO HS PRN


   PRN Reason: Insomnia


   Last Admin: 06/11/18 22:33  Dose: 10 mg





Zolpidem Tartrate (Ambien)  10 mg PO HS PRN; Protocol


   PRN Reason: Insomnia


   Last Admin: 06/13/18 21:15  Dose: 10 mg





Behavioural


 Document     06/13/18 21:15  DC  (Rec: 06/13/18 21:15  DC  [id number])


     Maintenance


      Maintenance Dose                           No


     Nonmedicinal


      Nonmedicinal Interventions                 Redirect


     Behavior


      Behavior for Medication:                   Insomnia


Re-Assess: Reassess Psych Meds


 Document     06/13/18 22:15  DC  (Rec: 06/14/18 00:29  DC  [id number])


      Reassess Psych Med                         Effective














- PA / NP / Resident Statement


LAINEY has reviewed & agrees with the documentation as recorded.





<Rodriguez Cash - Last Filed: 06/03/18 14:09>





- PA / NP / Resident Statement


LAINEY has reviewed & agrees with the documentation as recorded.





<Megan Greco - Last Filed: 06/15/18 13:37>





Disposition/Present on Arrival





- Present on Arrival


Any Indicators Present on Arrival: No


History of DVT/PE: No


History of Uncontrolled Diabetes: No


Urinary Catheter: No


History of Decub. Ulcer: No


History Surgical Site Infection Following: Orthopedic Procedures





- Disposition


Have Diagnosis and Disposition been Completed?: Yes


Disposition Time: 12:30


Patient Plan: Admission





<Rodriguez Cash - Last Filed: 06/03/18 14:09>





<Megan Greco - Last Filed: 06/15/18 13:37>





- Disposition


Diagnosis: 


 Drug abuse, Bipolar disease, manic





Disposition: HOSPITALIZED


Condition: STABLE

## 2018-06-03 NOTE — CARD
--------------- APPROVED REPORT --------------





EKG Measurement

Heart [id number]

[id number]

[id number]

[id number]

[id number]



<Conclusion>

Normal sinus rhythm with sinus arrhythmia

Normal ECG

## 2018-06-04 LAB
HDLC SERPL-MCNC: 31 MG/DL (ref 29–60)
LDLC SERPL-MCNC: 76 MG/DL (ref 0–129)
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
## 2018-06-07 NOTE — PCM.PYCHPN
Psychiatric Progress Note





- Psychiatric Progress Note


Patient seen today, length of contact: 30min


Patient Chief Complaint: 





"this is a BS...."


Problems Identified/Issues Discussed: 


attempted to discuss:


Suicide/ homicide prevention, past psychiatric h/o, current psychiatric symptoms

, medical problems, risk/benefits and alternatives of medications, medications 

compliance, coping strategies, substance abuse h/o, relapse prevention, 

importance of follow up with psychiatrist and therapist, discharge plan. 


Medical Problems: 


medical team was called for atelectasis in the CXR


Diagnostic Results: 














 06/03/18 11:34 





 06/03/18 11:34 





 Lab Results





06/04/18 07:00: RPR Nonreactive


06/04/18 07:00: Fasting Glucose 108, Triglycerides 335 H, Cholesterol 152, LDL 

Cholesterol Direct 76, HDL Cholesterol 31


06/03/18 14:53: Free T4 0.96, TSH 3rd Generation 0.33 L


06/03/18 13:15: Urine Opiates Screen Positive H, Urine Methadone Screen Negative

, Ur Barbiturates Screen Negative, Ur Phencyclidine Scrn Negative, Ur 

Amphetamines Screen Negative, U Benzodiazepines Scrn Positive H, U Oth Cocaine 

Metabols Positive H, U Cannabinoids Screen Negative


06/03/18 13:15: Urine Color Yellow, Urine Appearance Clear, Urine pH 5.5, Ur 

Specific Gravity >= 1.030, Urine Protein Negative, Urine Glucose (UA) Negative, 

Urine Ketones Negative, Urine Blood Negative, Urine Nitrate Negative, Urine 

Bilirubin Negative, Urine Urobilinogen 0.2, Ur Leukocyte Esterase Negative


06/03/18 11:34: Alcohol, Quantitative < 10


06/03/18 11:34: WBC 10.6  D, RBC 5.22, Hgb 16.1  D, Hct 45.1, MCV 86.4, MCH 30.8

, MCHC 35.7, RDW 13.2, Plt Count 205, MPV 11.4 H, Gran % 72.8 H, Lymph % (Auto) 

21.2 L, Mono % (Auto) 4.3, Eos % (Auto) 1.5, Baso % (Auto) 0.2, Gran # 7.69 H, 

Lymph # (Auto) 2.2, Mono # (Auto) 0.5, Eos # (Auto) 0.2, Baso # (Auto) 0.02


06/03/18 11:34: Salicylates < 1 L, Acetaminophen < 10.0 L


06/03/18 11:34: Sodium 145, Potassium 4.2, Chloride 109 H, Carbon Dioxide 23, 

Anion Gap 18, BUN 8, Creatinine 0.8, Est GFR (African Amer) > 60, Est GFR (Non-

Af Amer) > 60, Random Glucose 99, Calcium 9.3, Total Bilirubin 0.4, AST 39, ALT 

63 H, Alkaline Phosphatase 103, Total Protein 8.0, Albumin 4.7, Globulin 3.2, 

Albumin/Globulin Ratio 1.5











Vital Signs











  Temp Pulse Resp BP Pulse Ox


 


 06/05/18 06:37  97.6 F  102 H  19  117/87 


 


 06/05/18 03:35   91 H   125/74 


 


 06/04/18 16:00   91 H   125/74 


 


 06/04/18 07:34  97.8 F  90  20  126/94 H 


 


 06/03/18 17:50   96 H   148/91 H 


 


 06/03/18 16:09    16  


 


 06/03/18 14:56  98.1 F  96 H  16  148/91 H  100


 


 06/03/18 14:30  98.2 F  80  18  127/76  99


 


 06/03/18 12:38  98.4 F  87  18  152/85 H  98














 











Temp Pulse Resp BP Pulse Ox


 


 97.2 F L  93 H  20   108/84   100 


 


 06/06/18 07:13  06/06/18 07:13  06/06/18 07:13  06/06/18 07:13  06/03/18 14:56








DSM 5 Symptoms Update: 





shortly pt is 46yo male with h/o mental illness, most likely bipolar vs 

shizoaffective disorder, h/o noncompliance with the medications and follow up 

appointments, history of polysubstance abuse and dependence, history of 

involuntary commitment to [facility], patient was brought to 

the hospital by his friend for disorganized thoughts, patient also using some 

drugs, possible suicidal ideation, possible s/p suicidal attempt pt tried to 

hang himself in the bathroom in the hotel. patient is very familiar to this 

writer from the previous admission to the psychiatric inpatient unit, patient 

requires further hospitalization and stabilization to cause impulses are 

unpredictable, patient also verbalized thoughts of harming himself and others 

in the community.





Patient was seen and examined today in his room, pt c/o anxiety at the same 

time pt said that he feels overmedicated, patient's hygiene is improving, pt 

reported that he hears his ex-wife voice through the pillow. pt is guarded and 

paranoid, there is no recollection of his actions prior this admission, pt said 

"it is a BS, I did not try to kill myself..." (see previous note, as per family 

pt tried to hang self in the hotel). 





pt tolerates meds well, no side effects observed or reported, AIMS 0, no EPS, 

pt asked to d/c klonopin and give him ativan, will do so. 





as per staff pt was able to tolerate food, ate breakfast and lunch. 





Impression:


r/o schizoaffective disorder


r/o substance induced psychosis


polysubstance abuse and dependence








Medication Change: Yes (seroquel 300mg po daily and 300mg po hs)


Medical Record Reviewed: Yes (pharmacy, consults)


Consults ordered or reviewed: 





medical team recommended spirometer  for atelectasis











Mental Status Examination





- Cognitive Function


Orientation: Person, Place


Memory: Impaired


Attention: Poor


Concentration: Poor


Association: Loose


Fund of Knowledge: Poor





- Mood


Mood: Depressed, Anxious





- Affect


Affect: Flat





- Formal Thought Process


Formal Thought Process: Hallucinations, Delusions, Paranoia, Loosening of 

associations, Circumstantial





- Suicidal Ideation


Suicidal Ideation: No





- Homicidal Ideation


Homicidal Ideation: No





Goal/Treatment Plan





- Goal/Treatment Plan


Need for Continued Stay: Remain at risks for inpatient hospitalization, Severe 

depression anxiety, Discharge may exacerbated symptoms, Severe functional 

impairment


Progress Toward Problem(s) and Goals/Treatment Plan: 


Milieu/structure/supportive therapy 


Medical consult called, pt has atelectasis, HTN, chronic pain


seroquel 300mg po daily 300mg po hs for psychosis and mood stabilization


PRN meds 


klonopin d/c 


ativan 3mg po qid for alcohol withdrawals


Sonata 10mg po hs prn for insomnia


prozac 10mg po daily for depression and anxiety


neurontin 600mg po qid for anxiety and mood stabilization 


SW consultation for discharge plan and social issues 


Family involvement, pt gave written consent for collaterals from mother


Follow up on labs 


Will monitor closely 


Pt was educated about risk/benefits and alternatives of medications, coping 

strategies (safety plan, suicide prevention), relapse prevention, importance of 

follow up with psychiatrist and therapist, stay away from drugs/alcohol/smoking





Estimated Date of D/C: 06/12/18 (will monitor closely)

## 2018-06-08 NOTE — PCM.PYCHPN
Psychiatric Progress Note





- Psychiatric Progress Note


Patient seen today, length of contact: 30min


Patient Chief Complaint: 





"I am overmedicated and I am anxious"


Problems Identified/Issues Discussed: 


attempted to discuss:


Suicide/ homicide prevention, past psychiatric h/o, current psychiatric symptoms

, medical problems, risk/benefits and alternatives of medications, medications 

compliance, coping strategies, substance abuse h/o, relapse prevention, 

importance of follow up with psychiatrist and therapist, discharge plan. 


Medical Problems: 


medical team was called for atelectasis in the CXR


Diagnostic Results: 














 06/03/18 11:34 





 06/03/18 11:34 





 Lab Results





06/04/18 07:00: RPR Nonreactive


06/04/18 07:00: Fasting Glucose 108, Triglycerides 335 H, Cholesterol 152, LDL 

Cholesterol Direct 76, HDL Cholesterol 31


06/03/18 14:53: Free T4 0.96, TSH 3rd Generation 0.33 L


06/03/18 13:15: Urine Opiates Screen Positive H, Urine Methadone Screen Negative

, Ur Barbiturates Screen Negative, Ur Phencyclidine Scrn Negative, Ur 

Amphetamines Screen Negative, U Benzodiazepines Scrn Positive H, U Oth Cocaine 

Metabols Positive H, U Cannabinoids Screen Negative


06/03/18 13:15: Urine Color Yellow, Urine Appearance Clear, Urine pH 5.5, Ur 

Specific Gravity >= 1.030, Urine Protein Negative, Urine Glucose (UA) Negative, 

Urine Ketones Negative, Urine Blood Negative, Urine Nitrate Negative, Urine 

Bilirubin Negative, Urine Urobilinogen 0.2, Ur Leukocyte Esterase Negative


06/03/18 11:34: Alcohol, Quantitative < 10


06/03/18 11:34: WBC 10.6  D, RBC 5.22, Hgb 16.1  D, Hct 45.1, MCV 86.4, MCH 30.8

, MCHC 35.7, RDW 13.2, Plt Count 205, MPV 11.4 H, Gran % 72.8 H, Lymph % (Auto) 

21.2 L, Mono % (Auto) 4.3, Eos % (Auto) 1.5, Baso % (Auto) 0.2, Gran # 7.69 H, 

Lymph # (Auto) 2.2, Mono # (Auto) 0.5, Eos # (Auto) 0.2, Baso # (Auto) 0.02


06/03/18 11:34: Salicylates < 1 L, Acetaminophen < 10.0 L


06/03/18 11:34: Sodium 145, Potassium 4.2, Chloride 109 H, Carbon Dioxide 23, 

Anion Gap 18, BUN 8, Creatinine 0.8, Est GFR (African Amer) > 60, Est GFR (Non-

Af Amer) > 60, Random Glucose 99, Calcium 9.3, Total Bilirubin 0.4, AST 39, ALT 

63 H, Alkaline Phosphatase 103, Total Protein 8.0, Albumin 4.7, Globulin 3.2, 

Albumin/Globulin Ratio 1.5











Vital Signs











  Temp Pulse Resp BP Pulse Ox


 


 06/05/18 06:37  97.6 F  102 H  19  117/87 


 


 06/05/18 03:35   91 H   125/74 


 


 06/04/18 16:00   91 H   125/74 


 


 06/04/18 07:34  97.8 F  90  20  126/94 H 


 


 06/03/18 17:50   96 H   148/91 H 


 


 06/03/18 16:09    16  


 


 06/03/18 14:56  98.1 F  96 H  16  148/91 H  100


 


 06/03/18 14:30  98.2 F  80  18  127/76  99


 


 06/03/18 12:38  98.4 F  87  18  152/85 H  98














 











Temp Pulse Resp BP Pulse Ox


 


 97.2 F L  93 H  20   108/84   100 


 


 06/06/18 07:13  06/06/18 07:13  06/06/18 07:13  06/06/18 07:13  06/03/18 14:56








DSM 5 Symptoms Update: 





shortly pt is 48yo male with h/o mental illness, most likely bipolar vs 

shizoaffective disorder, h/o noncompliance with the medications and follow up 

appointments, history of polysubstance abuse and dependence, history of 

involuntary commitment to [facility], patient was brought to 

the hospital by his friend for disorganized thoughts, patient also using some 

drugs, possible suicidal ideation, possible s/p suicidal attempt pt tried to 

hang himself in the bathroom in the hotel. patient is very familiar to this 

writer from the previous admission to the psychiatric inpatient unit, patient 

requires further hospitalization and stabilization to cause impulses are 

unpredictable, patient also verbalized thoughts of harming himself and others 

in the community.





Patient was seen and examined today at the treatment team room, pt is 

medication seeking, said that "I feel drowsy on medications you prescribed", at 

the same time pt was asking for medication for anxiety, it is not possible to 

feel drowsy and anxious at the same time. 





pt was keep asking ro PRN medications constantly, treatment plan was d/w pt and 

pt agreed for the following meds:


thorazine PO/IM prn


ativan will be d/c, xanax the equivalent dose with the plan to wean it off


vistaril as needed


NO pain killers, pt is on tramadol


pt is on seroquel, prozac, neurontin, sonata





patient's hygiene is improving, pt reported that he hears his ex-wife voice 

through the pillow. pt is guarded and paranoid, there is no recollection of his 

actions prior this admission, pt said "it is a BS, I did not try to kill 

myself..." (see previous note, as per family pt tried to hang self in the hotel)

. 





pt tolerates meds well, no side effects observed or reported, AIMS 0, no EPS.





as per staff pt was able to tolerate food, ate breakfast and lunch, pt is 

intrusive. 





Impression:


r/o schizoaffective disorder


r/o substance induced psychosis


polysubstance abuse and dependence





Medication Change: Yes (neurontin increased, cogentin, thorazine xanax)


Medical Record Reviewed: Yes (pharmacy, consults)


Consults ordered or reviewed: 





medical team recommended spirometer  for atelectasis, respiratory was called














Mental Status Examination





- Cognitive Function


Orientation: Person, Place


Memory: Impaired


Attention: Poor


Concentration: Poor


Association: Loose


Fund of Knowledge: Poor





- Mood


Mood: Depressed, Anxious





- Affect


Affect: Flat





- Formal Thought Process


Formal Thought Process: Hallucinations, Delusions, Paranoia, Loosening of 

associations, Circumstantial





- Suicidal Ideation


Suicidal Ideation: No





- Homicidal Ideation


Homicidal Ideation: No





Goal/Treatment Plan





- Goal/Treatment Plan


Need for Continued Stay: Remain at risks for inpatient hospitalization, Severe 

depression anxiety, Discharge may exacerbated symptoms, Severe functional 

impairment


Progress Toward Problem(s) and Goals/Treatment Plan: 


Milieu/structure/supportive therapy 


Medical consult called, pt has atelectasis, HTN, chronic pain


seroquel 300mg po daily 300mg po hs for psychosis and mood stabilization


PRN meds (thorazine, benadryl, tramadol, clonidine)


xanax 1mg po qid, with the plan to taper it down, 


klonopin d/c 


ativan d/c


Sonata 10mg po hs prn for insomnia


prozac 20mg po daily for depression and anxiety


neurontin 600mg po qid for anxiety and mood stabilization 


SW consultation for discharge plan and social issues 


Family involvement, pt gave written consent for collaterals from mother


Follow up on labs 


Will monitor closely 


Pt was educated about risk/benefits and alternatives of medications, coping 

strategies (safety plan, suicide prevention), relapse prevention, importance of 

follow up with psychiatrist and therapist, stay away from drugs/alcohol/smoking





Estimated Date of D/C: 06/12/18 (will monitor closely)

## 2018-06-09 NOTE — PCM.PYCHPN
Psychiatric Progress Note





- Psychiatric Progress Note


Patient seen today, length of contact: 25 min


Problems Identified/Issues Discussed: 


I reviewed assessment. Patient is a 48yo male with h/o mental illness, most 

likely bipolar vs shizoaffective disorder, h/o noncompliance with the 

medications and follow up appointments, history of polysubstance abuse and 

dependence, history of involuntary commitment to [facility] who 

was admitted to the psychiatric unit for disorganization,  possible suicidal 

ideation, possible s/p suicidal attempt by trying to hang himself in the 

bathroom in the hotel. Patient also verbalized thoughts of harming himself and 

others in the community.





I reviewed recent notes. Patient has been labile, restless and medication 

seeking. Ativan was changed to Xanax yesterday per patient request however 

patient required Ativan last night due to anxiety again.  Vitals have also been 

elevated.


I meet with him at bedside and in the hallway again. He is fairly groomed and 

coherent. Indicates that he is very anxious and that he isnt sleeping well. 

Doesnt feel like anything helps with his insomnia. Denies any hallucinations 

and doesnt appear to be responding to internal stimuli. He does appear anxious 

and restless, staff have noted him to be intrusive sometimes.





Patient is tolerating his current medications and denies any new side effects 

or new pain. Still complains of aches all over. He doesn't appear to be in any 

physical discomfort during my interviews this morning.


I discuss patients current symptoms of anxiety, his unstable vitals and 

switching BACK to Ativan from Xanax and patient is in agreement.  








Diagnostic Results: 


r/o schizoaffective disorder


r/o substance induced psychosis


polysubstance abuse and dependence











Medication Change: Yes (d/c xanax, restart ativan)


Medical Record Reviewed: Yes (pharmacy, consults)





Mental Status Examination





- Cognitive Function


Orientation: Person, Place


Memory: Impaired


Attention: Poor


Concentration: Poor


Association: Loose


Fund of Knowledge: Poor





- Mood


Mood: Depressed, Anxious





- Affect


Affect: Flat





- Formal Thought Process


Formal Thought Process: Hallucinations, Delusions, Paranoia, Loosening of 

associations, Circumstantial





- Suicidal Ideation


Suicidal Ideation: No





- Homicidal Ideation


Homicidal Ideation: No





Goal/Treatment Plan





- Goal/Treatment Plan


Need for Continued Stay: Remain at risks for inpatient hospitalization, Severe 

depression anxiety, Discharge may exacerbated symptoms, Severe functional 

impairment


Progress Toward Problem(s) and Goals/Treatment Plan: 








* c/w current tx and plan


* xanax 1mg po qid switched to ativan 2 mg q6 with plan to taper as tolerated


* Seroquel increased from 300/300 to 300/350 to help with lability and impulse 

control





* No new weekend labs thus far


* Vitals reviewed and noted below:


 Selected Entries











  06/09/18





  06:35


 


Temperature 97.4 F L


 


Pulse Rate 103 H


 


Respiratory 20





Rate 


 


Blood Pressure 113/70








  








  06/08/18 06/08/18 06/08/18





  07:04 15:46 22:21


 


Temperature 97.6 F  


 


Pulse Rate 116 H 112 H 120 H


 


Respiratory 20  





Rate   


 


Blood Pressure 120/73 132/83 137/92 H








Estimated Date of D/C: 06/12/18 (will monitor closely)

## 2018-06-10 NOTE — PCM.PYCHPN
Psychiatric Progress Note





- Psychiatric Progress Note


Patient seen today, length of contact: 25 min


Problems Identified/Issues Discussed: 


I have reviewed assessment. Patient is a 46yo male with h/o mental illness, 

most likely bipolar vs shizoaffective disorder, h/o noncompliance with the 

medications and follow up appointments, history of polysubstance abuse and 

dependence, history of involuntary commitment to [facility] who 

was admitted to the psychiatric unit for disorganization,  possible suicidal 

ideation, possible s/p suicidal attempt by trying to hang himself in the 

bathroom in the hotel. Patient also verbalized thoughts of harming himself and 

others in the community.








I reviewed recent notes. Patient has been labile, restless and medication 

seeking. Ativan was changed to Xanax on Friday per patient request however 

patient required stat Ativan 2 mg Friday night due to recurrent anxiety. 

Multiple sets of vitals were elevated right after the switch to Xanax so Xanax 

was switched back to Ativan on Saturday morning (with patient knowledge and 

consent)  





Patient remains medication seeking and has been given multiple medications for 

various symptoms over the weekend.  Patient has complained about anxiety, 

auditory hallucinations, insomnia agitation/restless legs and pain. He has been 

given Ativan & vistaril po, Thorazine 50 mg po, Sonata, Benadryl po and Ultram 

prn respectively for these complaints in addition to his standing medications. 





Not surprisingly, patient has been confused and at times delusional over the 

weekend. This is likely due to withdrawal and possibly secondary to the changes 

in benzo regimen, not to mention the accumulative effects of longer acting 

Ativan. He was finally able to sleep last night but this was at 2:30 am. He 

remains unpredictable and requires continued close observation. I appreciate 

staff updates in this regard. 





.


Diagnostic Results: 


r/o schizoaffective disorder


r/o substance induced psychosis


polysubstance abuse and dependence











Medication Change: Yes (ativan 2 mg q6 decreased to 2 mg q8)


Medical Record Reviewed: Yes (pharmacy, consults)





Mental Status Examination





- Cognitive Function


Orientation: Person, Place


Memory: Impaired


Attention: Poor


Concentration: Poor


Association: Loose


Fund of Knowledge: Poor





- Mood


Mood: Depressed, Anxious





- Affect


Affect: Flat





- Formal Thought Process


Formal Thought Process: Hallucinations, Delusions, Paranoia, Loosening of 

associations, Circumstantial





- Suicidal Ideation


Suicidal Ideation: No





- Homicidal Ideation


Homicidal Ideation: No





Goal/Treatment Plan





- Goal/Treatment Plan


Need for Continued Stay: Remain at risks for inpatient hospitalization, Severe 

depression anxiety, Discharge may exacerbated symptoms, Severe functional 

impairment


Progress Toward Problem(s) and Goals/Treatment Plan: 








* c/w current tx and plan


* xanax 1mg po qid switched to ativan 2 mg q6 on 6/9/18. 


   Ativan 2 mg q6 decreased to 2 mg q8 on 6/10/18 with plan to taper as 

tolerated


* Seroquel increased from 300/300 to 300/350 on 6/9/18 to help with lability 

and impulse control





* No new weekend labs  


* Vitals reviewed and noted below: 





 Vital Signs (72 hours)











  06/07/18 06/08/18 06/08/18





  19:37 07:04 15:46


 


Temperature  97.6 F 


 


Pulse Rate 75 116 H 112 H


 


Respiratory  20 





Rate   


 


Blood Pressure 134/91 H 120/73 132/83














  06/08/18 06/09/18 06/09/18





  22:21 06:35 16:00


 


Temperature  97.4 F L 


 


Pulse Rate 120 H 103 H 102 H


 


Respiratory  20 





Rate   


 


Blood Pressure 137/92 H 113/70 132/87














  06/09/18





  19:29


 


Temperature 


 


Pulse Rate 120 H


 


Respiratory 





Rate 


 


Blood Pressure 133/90











Estimated Date of D/C: 06/12/18 (will monitor closely)

## 2018-06-10 NOTE — CP.PCM.PN
<Tomer Huston - Last Filed: 06/10/18 19:58>





Subjective





- Date & Time of Evaluation


Date of Evaluation: 06/10/18


Time of Evaluation: 19:52





- Subjective


Subjective: 





Patient seen and examined at bedside. Per nursing the patient's heart rate was 

elevated at 130 and Medicine was consulted. Patient denies any chest pain, 

shortness of breath, fevers, chills, nausea, vomiting, changes in vision, or 

any other complaints.





Objective





- Vital Signs/Intake and Output


Vital Signs (last 24 hours): 


 











Temp Pulse Resp BP Pulse Ox


 


 97.4 F L  128 H  20   135/83   100 


 


 06/09/18 06:35  06/10/18 16:45  06/09/18 06:35  06/10/18 16:45  06/03/18 14:56











- Medications


Medications: 


 Current Medications





Acetaminophen (Tylenol 325mg Tab)  650 mg PO Q4 PRN


   PRN Reason: Pain, Mild (1-3)


Al Hydrox/Mg Hydrox/Simethicone (Maalox Plus 30 Ml)  30 ml PO DAILY PRN


   PRN Reason: Upset Stomach


Benztropine Mesylate (Cogentin)  2 mg PO [facility]


   Last Admin: 06/10/18 09:00 Dose:  2 mg


Chlorpromazine (Thorazine)  50 mg PO QID PRN; Protocol


   PRN Reason: agitation/psychosis


   Last Admin: 06/09/18 20:24 Dose:  50 mg


Chlorpromazine (Thorazine)  50 mg IM QID PRN; Protocol


   PRN Reason: Agitation


Clonidine HCl (Catapres)  0.1 mg PO Q12H PRN


   PRN Reason: Other


   Last Admin: 06/09/18 19:29 Dose:  0.1 mg


Diphenhydramine HCl (Benadryl)  50 mg IM Q6H PRN


   PRN Reason: Agitation


   Last Admin: 06/09/18 23:27 Dose:  50 mg


Fluoxetine HCl (Prozac)  20 mg PO DAILY [facility]


   Last Admin: 06/10/18 08:15 Dose:  20 mg


Gabapentin (Neurontin)  600 mg PO QID HOME


   PRN Reason: Protocol


   Last Admin: 06/10/18 17:27 Dose:  600 mg


Hydroxyzine Pamoate (Vistaril)  50 mg PO Q8 PRN; Protocol


   PRN Reason: Anxiety


   Last Admin: 06/09/18 16:00 Dose:  50 mg


Lorazepam (Ativan)  2 mg PO Q8 HOME


   PRN Reason: Protocol


Magnesium Hydroxide (Milk Of Magnesia)  30 ml PO DAILY PRN


   PRN Reason: Constipation


Quetiapine Fumarate (Seroquel)  300 mg PO DAILY HOME


   PRN Reason: Protocol


   Last Admin: 06/10/18 08:14 Dose:  300 mg


Quetiapine Fumarate (Seroquel)  350 mg PO HS HOME


   PRN Reason: Protocol


   Last Admin: 06/09/18 21:03 Dose:  350 mg


Tamsulosin HCl (Flomax)  0.4 mg PO DAILY HOME


   Last Admin: 06/10/18 08:15 Dose:  0.4 mg


Tramadol HCl (Ultram)  50 mg PO TID PRN


   PRN Reason: pain>=7/10


   Last Admin: 06/09/18 07:52 Dose:  50 mg


Zaleplon (Sonata)  10 mg PO HS PRN


   PRN Reason: Insomnia


   Last Admin: 06/09/18 21:36 Dose:  10 mg











- Head Exam


Head Exam: ATRAUMATIC, NORMAL INSPECTION, NORMOCEPHALIC





- Eye Exam


Eye Exam: EOMI, Normal appearance, PERRL


Pupil Exam: NORMAL ACCOMODATION





- ENT Exam


ENT Exam: Mucous Membranes Moist





- Respiratory Exam


Respiratory Exam: Clear to Ausculation Bilateral, NORMAL BREATHING PATTERN.  

absent: Prolonged Expiratory Phase, Respiratory Distress





- Cardiovascular Exam


Cardiovascular Exam: Tachycardia, +S1, +S2





- GI/Abdominal Exam


GI & Abdominal Exam: Soft, Normal Bowel Sounds





- Extremities Exam


Extremities Exam: Full ROM, Normal Inspection.  absent: Pedal Edema





- Back Exam


Back Exam: NORMAL INSPECTION.  absent: CVA tenderness (L), CVA tenderness (R), 

paraspinal tenderness





- Neurological Exam


Neurological Exam: Alert, Awake, CN II-XII Intact, Normal Gait, Oriented x3





- Psychiatric Exam


Psychiatric exam: Normal Affect, Normal Mood





- Skin


Skin Exam: Dry, Intact, Normal Color





Assessment and Plan





- Assessment and Plan (Free Text)


Assessment: 





47 year old male with PMH bipolar disorder, schizoaffective d/o, prior suicide 

attempts, presents for paranoid thoughts, depression. Currently being treated 

in the psych moody for above issues. Patient Tachycardic on vitals.


Plan: 





1. Tachycardia


-Patient has history of cocaine use.


-Repeat EKG was sinus tachycardia at 115 bpm.


-Patient asymptomatic at the time of examination.


-D-dimer ordered .Will f/u with results.


-Will monitor closely.





Drug abuse:


- Monitor for withdrawal


- Ativan prn


- Psych on board.





Psych conditions:


- as per psych management





Will continue to follow.





Plan discussed with Dr. Burnette.





Tomer Huston, PGY-1











<Jorge Burnette - Last Filed: 06/11/18 15:31>





Objective





- Vital Signs/Intake and Output


Vital Signs (last 24 hours): 


 











Temp Pulse Resp BP Pulse Ox


 


 97.8 F   80   17   127/86   100 


 


 06/11/18 09:00  06/11/18 09:00  06/11/18 09:00  06/11/18 09:00  06/03/18 14:56











- Medications


Medications: 


 Current Medications





Acetaminophen (Tylenol 325mg Tab)  650 mg PO Q4 PRN


   PRN Reason: Pain, Mild (1-3)


Al Hydrox/Mg Hydrox/Simethicone (Maalox Plus 30 Ml)  30 ml PO DAILY PRN


   PRN Reason: Upset Stomach


Benztropine Mesylate (Cogentin)  2 mg PO [facility]


   Last Admin: 06/11/18 10:55 Dose:  2 mg


Chlorpromazine (Thorazine)  50 mg PO QID PRN; Protocol


   PRN Reason: agitation/psychosis


   Last Admin: 06/11/18 10:11 Dose:  50 mg


Chlorpromazine (Thorazine)  50 mg IM QID PRN; Protocol


   PRN Reason: Agitation


Clonidine HCl (Catapres)  0.1 mg PO Q12H PRN


   PRN Reason: Other


   Last Admin: 06/11/18 00:37 Dose:  0.1 mg


Diphenhydramine HCl (Benadryl)  50 mg IM Q6H PRN


   PRN Reason: Agitation


   Last Admin: 06/09/18 23:27 Dose:  50 mg


Fluoxetine HCl (Prozac)  20 mg PO DAILY [facility]


   Last Admin: 06/11/18 10:56 Dose:  20 mg


Gabapentin (Neurontin)  600 mg PO QID HOME


   PRN Reason: Protocol


   Last Admin: 06/11/18 10:55 Dose:  600 mg


Hydroxyzine Pamoate (Vistaril)  50 mg PO Q8 PRN; Protocol


   PRN Reason: Anxiety


   Last Admin: 06/09/18 16:00 Dose:  50 mg


Lorazepam (Ativan)  2 mg PO Q8 HOME


   PRN Reason: Protocol


   Last Admin: 06/11/18 11:01 Dose:  Not Given


Magnesium Hydroxide (Milk Of Magnesia)  30 ml PO DAILY PRN


   PRN Reason: Constipation


Quetiapine Fumarate (Seroquel)  300 mg PO DAILY HOME


   PRN Reason: Protocol


   Last Admin: 06/11/18 11:46 Dose:  300 mg


Quetiapine Fumarate (Seroquel)  350 mg PO HS HOME


   PRN Reason: Protocol


   Last Admin: 06/10/18 21:42 Dose:  350 mg


Tamsulosin HCl (Flomax)  0.4 mg PO DAILY HOME


   Last Admin: 06/11/18 10:56 Dose:  0.4 mg


Tramadol HCl (Ultram)  50 mg PO TID PRN


   PRN Reason: pain>=7/10


   Last Admin: 06/11/18 11:44 Dose:  50 mg


Zaleplon (Sonata)  10 mg PO HS PRN


   PRN Reason: Insomnia


   Last Admin: 06/09/18 21:36 Dose:  10 mg











- Labs


Labs: 


 





 06/11/18 09:15 





 06/11/18 09:15 











Attending/Attestation





- Attestation


I have personally seen and examined this patient.: Yes


I have fully participated in the care of the patient.: Yes


I have reviewed all pertinent clinical information, including history, physical 

exam and plan: Yes


Notes (Text): 





06/11/18 15:31


Medical record note made by the resident after discussion with my direction and 

input after the patient was personally seen and examined by me. I have reviewed 

the chart and agree that the record accurately reflects by personal performance 

of the history, physical exam, data review, and medical decision-making, in the 

course for the patient. I have also personally directed the plan of care.

## 2018-06-11 LAB
ALBUMIN SERPL-MCNC: 4.3 G/DL (ref 3–4.8)
ALBUMIN/GLOB SERPL: 1.5 {RATIO} (ref 1.1–1.8)
ALT SERPL-CCNC: 52 U/L (ref 7–56)
APPEARANCE UR: CLEAR
AST SERPL-CCNC: 28 U/L (ref 17–59)
BASOPHILS # BLD AUTO: 0.01 K/MM3 (ref 0–2)
BASOPHILS NFR BLD: 0.1 % (ref 0–3)
BILIRUB UR-MCNC: NEGATIVE MG/DL
BUN SERPL-MCNC: 12 MG/DL (ref 7–21)
CALCIUM SERPL-MCNC: 9.3 MG/DL (ref 8.4–10.5)
COLOR UR: YELLOW
EOSINOPHIL # BLD: 0.2 10*3/UL (ref 0–0.7)
EOSINOPHIL NFR BLD: 1.9 % (ref 1.5–5)
ERYTHROCYTE [DISTWIDTH] IN BLOOD BY AUTOMATED COUNT: 13.4 % (ref 11.5–14.5)
GFR NON-AFRICAN AMERICAN: > 60
GLUCOSE UR STRIP-MCNC: NEGATIVE MG/DL
GRANULOCYTES # BLD: 6.38 10*3/UL (ref 1.4–6.5)
GRANULOCYTES NFR BLD: 66.5 % (ref 50–68)
HGB BLD-MCNC: 14.2 G/DL (ref 14–18)
LEUKOCYTE ESTERASE UR-ACNC: NEGATIVE LEU/UL
LYMPHOCYTES # BLD: 2.3 10*3/UL (ref 1.2–3.4)
LYMPHOCYTES NFR BLD AUTO: 23.6 % (ref 22–35)
MCH RBC QN AUTO: 30.4 PG (ref 25–35)
MCHC RBC AUTO-ENTMCNC: 34.5 G/DL (ref 31–37)
MCV RBC AUTO: 88 FL (ref 80–105)
MONOCYTES # BLD AUTO: 0.8 10*3/UL (ref 0.1–0.6)
MONOCYTES NFR BLD: 7.9 % (ref 1–6)
PH UR STRIP: 6 [PH] (ref 4.7–8)
PLATELET # BLD: 139 10^3/UL (ref 120–450)
PMV BLD AUTO: 11 FL (ref 7–11)
PROT UR STRIP-MCNC: NEGATIVE MG/DL
RBC # BLD AUTO: 4.67 10^6/UL (ref 3.5–6.1)
RBC # UR STRIP: NEGATIVE /UL
SP GR UR STRIP: 1.02 (ref 1–1.03)
UROBILINOGEN UR STRIP-ACNC: 0.2 E.U./DL
WBC # BLD AUTO: 9.6 10^3/UL (ref 4.5–11)

## 2018-06-11 NOTE — PCM.BM
<FranckJeni Y - Last Filed: 06/11/18 17:14>





Treatment Plan Problems





- Problems identified on initial assessmt


  ** Agitated Behavior


Date Initiated: 06/03/18


Time Initiated: 16:51


Assessment reference: NA


Status: Active


Priority: 1





  ** Altered Thought Process


Date Initiated: 06/03/18


Time Initiated: 16:51


Assessment reference: NA


Status: Active


Priority: 2





  ** Auditory Hallucinations


Date Initiated: 06/03/18


Time Initiated: 16:51


Assessment reference: NA


Status: Active


Priority: 3





  ** Delusions


Date Initiated: 06/03/18


Time Initiated: 16:51


Assessment reference: NA


Status: Active


Priority: 4





  ** Hopelessness


Date Initiated: 06/03/18


Time Initiated: 16:51


Assessment reference: NA


Status: Active


Priority: 5





  ** Altered Sleep Patterns


Date Initiated: 06/03/18


Time Initiated: 16:52


Assessment reference: NA


Status: Active


Priority: 6





Treatment assets and liabiliti


Patient Assests: cooperative, insightful, motivated, self-reliant, ADL 

independent, physically healthy, good support system, negotiates basic needs, 

cognitively intact


Patient Liabilities: substance abuse





- Milieu Protocol


Maintain good personal hygiene: every shift Encourage regular showers, every 

shift Remind patient to perform daily oral care, every shift Assist patient to 

perform ADL's


Conduct patient checks and document Observation sheet: Q15 minutes


Maintain personal safety: daily Educate patient to report safety concerns to 

staff, daily Monitor environment for contraband/sharps


Medication safety: Monitor for expected outcome, potential side effects: daily, 

Assess barriers to learning: daily, Assess readiness for medication education: 

daily


Milieu Narrative: 








* c/w current tx and plan


* xanax 1mg po qid switched to ativan 2 mg q6 on 6/9/18. 


   Ativan 2 mg q6 decreased to 2 mg q8 on 6/10/18 with plan to taper as 

tolerated


* Seroquel increased from 300/300 to 300/350 on 6/9/18 to help with lability 

and impulse control





* No new weekend labs  


* Vitals reviewed and noted below: 





 Vital Signs (72 hours)











  06/07/18 06/08/18 06/08/18





  19:37 07:04 15:46


 


Temperature  97.6 F 


 


Pulse Rate 75 116 H 112 H


 


Respiratory  20 





Rate   


 


Blood Pressure 134/91 H 120/73 132/83














  06/08/18 06/09/18 06/09/18





  22:21 06:35 16:00


 


Temperature  97.4 F L 


 


Pulse Rate 120 H 103 H 102 H


 


Respiratory  20 





Rate   


 


Blood Pressure 137/92 H 113/70 132/87














  06/09/18





  19:29


 


Temperature 


 


Pulse Rate 120 H


 


Respiratory 





Rate 


 


Blood Pressure 133/90














Family Contact


Family involvement: Family/SO is involved


Family contact: Patient agrees to contact


Family contact name: Jocelyne(sister)


Family contacted how many times per week?: 2





Discharge/Continuing Care





- Education Needs


Education Needs: Patient Medication, Patient Diagnosis/Disease Process, Patient 

Coping Skills, Patient Anger Management skills, Patient Aftercare Safety Plan





- Discharge


Discharge Criteria: Tolerates medication w/o severe side effects, Free of 

paranoid thoughts, Free of agitation, Normal sleep pattern, Ability to care for 

self, No longer exhibiting s/s of withdrawal, Reduction of target symptoms


Discharge to:: Home





- Treatment Team Participation


Patient/Family/SO Statement: 








* c/w current tx and plan


* xanax 1mg po qid switched to ativan 2 mg q6 on 6/9/18. 


   Ativan 2 mg q6 decreased to 2 mg q8 on 6/10/18 with plan to taper as 

tolerated


* Seroquel increased from 300/300 to 300/350 on 6/9/18 to help with lability 

and impulse control





* No new weekend labs  


* Vitals reviewed and noted below: 





 Vital Signs (72 hours)











  06/07/18 06/08/18 06/08/18





  19:37 07:04 15:46


 


Temperature  97.6 F 


 


Pulse Rate 75 116 H 112 H


 


Respiratory  20 





Rate   


 


Blood Pressure 134/91 H 120/73 132/83














  06/08/18 06/09/18 06/09/18





  22:21 06:35 16:00


 


Temperature  97.4 F L 


 


Pulse Rate 120 H 103 H 102 H


 


Respiratory  20 





Rate   


 


Blood Pressure 137/92 H 113/70 132/87














  06/09/18





  19:29


 


Temperature 


 


Pulse Rate 120 H


 


Respiratory 





Rate 


 


Blood Pressure 133/90














Treatment Plan Review





- Problem


  ** Agitated Behavior


Time Initiated: 16:51





  ** Altered Thought Process


Time Initiated: 16:51





  ** Auditory Hallucinations


Time Initiated: 16:51





  ** Delusions


Time Initiated: 16:51





  ** Hopelessness


Time Initiated: 16:51





  ** Altered Sleep Patterns


Time Initiated: 16:52





<Cl Gramajo - Last Filed: 06/11/18 17:17>





Treatment Plan Problems





- Problems identified on initial assessmt


  ** Agitated Behavior


Date resolved: 06/11/18





<Jennifer Corey - Last Filed: 06/11/18 17:45>





- Diagnosis


(1) Schizoaffective disorder


Status: Acute   


Interventions: 





06/11/18 17:45


patient is with minimal improvement with his psychotic symptoms, today was 

agitated, threw paper towards this writer, was cursing, needed to have IM of 

Thorazine and at


Patient still psychotic, disrespectful








(2) Polysubstance abuse


Status: Acute   


Interventions: 





06/11/18 17:45


patient has poor insight into addiction problems


Does not want to go to inpatient rehabilitation

## 2018-06-11 NOTE — CP.PCM.PN
<Sparkle Starks - Last Filed: 06/11/18 16:00>





Subjective





- Date & Time of Evaluation


Date of Evaluation: 06/11/18


Time of Evaluation: 16:00





- Subjective


Subjective: 





Sparkle Starks, PGY1, Progress Note for Dr Gil:





Patient seen and examined at bedside. No acute events overnight. Reports mild 

palpitations. Denies cp, sob, leg swelling, diaphoresis, cough, headache, nausea

, vomiting, abdominal pain.





Objective





- Vital Signs/Intake and Output


Vital Signs (last 24 hours): 


 











Temp Pulse Resp BP Pulse Ox


 


 97.8 F   80   17   127/86   100 


 


 06/11/18 09:00  06/11/18 09:00  06/11/18 09:00  06/11/18 09:00  06/03/18 14:56











- Medications


Medications: 


 Current Medications





Acetaminophen (Tylenol 325mg Tab)  650 mg PO Q4 PRN


   PRN Reason: Pain, Mild (1-3)


Al Hydrox/Mg Hydrox/Simethicone (Maalox Plus 30 Ml)  30 ml PO DAILY PRN


   PRN Reason: Upset Stomach


Benztropine Mesylate (Cogentin)  2 mg PO AMHS [facility]


   Last Admin: 06/11/18 10:55 Dose:  2 mg


Chlorpromazine (Thorazine)  50 mg PO QID PRN; Protocol


   PRN Reason: agitation/psychosis


   Last Admin: 06/11/18 10:11 Dose:  50 mg


Chlorpromazine (Thorazine)  50 mg IM QID PRN; Protocol


   PRN Reason: Agitation


Clonidine HCl (Catapres)  0.1 mg PO Q12H PRN


   PRN Reason: Other


   Last Admin: 06/11/18 00:37 Dose:  0.1 mg


Diphenhydramine HCl (Benadryl)  50 mg IM Q6H PRN


   PRN Reason: Agitation


   Last Admin: 06/09/18 23:27 Dose:  50 mg


Fluoxetine HCl (Prozac)  20 mg PO DAILY [facility]


   Last Admin: 06/11/18 10:56 Dose:  20 mg


Gabapentin (Neurontin)  600 mg PO QID [facility]


   PRN Reason: Protocol


   Last Admin: 06/11/18 13:25 Dose:  600 mg


Hydroxyzine Pamoate (Vistaril)  50 mg PO Q8 PRN; Protocol


   PRN Reason: Anxiety


   Last Admin: 06/09/18 16:00 Dose:  50 mg


Lorazepam (Ativan)  2 mg PO Q8 [facility]


   PRN Reason: Protocol


   Last Admin: 06/11/18 15:46 Dose:  2 mg


Magnesium Hydroxide (Milk Of Magnesia)  30 ml PO DAILY PRN


   PRN Reason: Constipation


Propranolol HCl (Inderal)  10 mg PO BID [facility]


Quetiapine Fumarate (Seroquel)  300 mg PO DAILY [facility]


   PRN Reason: Protocol


   Last Admin: 06/11/18 11:46 Dose:  300 mg


Quetiapine Fumarate (Seroquel)  350 mg PO HS [facility]


   PRN Reason: Protocol


   Last Admin: 06/10/18 21:42 Dose:  350 mg


Tamsulosin HCl (Flomax)  0.4 mg PO DAILY [facility]


   Last Admin: 06/11/18 10:56 Dose:  0.4 mg


Tramadol HCl (Ultram)  50 mg PO TID PRN


   PRN Reason: pain>=7/10


   Last Admin: 06/11/18 11:44 Dose:  50 mg


Zaleplon (Sonata)  10 mg PO HS PRN


   PRN Reason: Insomnia


   Last Admin: 06/09/18 21:36 Dose:  10 mg











- Labs


Labs: 


 





 06/11/18 09:15 





 06/11/18 09:15 











- Constitutional


Appears: Non-toxic, No Acute Distress





- Head Exam


Head Exam: ATRAUMATIC, NORMOCEPHALIC





- Eye Exam


Eye Exam: EOMI, PERRL.  absent: Conjunctival injection, Nystagmus, Scleral 

icterus


Pupil Exam: NORMAL ACCOMODATION, PERRL.  absent: Irregular, Miosis, Unequal





- ENT Exam


ENT Exam: Mucous Membranes Moist





- Neck Exam


Neck Exam: Full ROM





- Respiratory Exam


Respiratory Exam: Clear to Ausculation Bilateral, NORMAL BREATHING PATTERN.  

absent: Accessory Muscle Use, Rales, Rhonchi, Wheezes, Respiratory Distress, 

Stridor





- Cardiovascular Exam


Cardiovascular Exam: Tachycardia, +S1, +S2.  absent: Murmur





- GI/Abdominal Exam


GI & Abdominal Exam: Soft, Normal Bowel Sounds.  absent: Distended, Firm, 

Guarding, Rigid, Tenderness, Mass, Pulsatile Mass, Rebound





- Extremities Exam


Extremities Exam: Normal Inspection.  absent: Calf Tenderness, Pedal Edema





- Back Exam


Back Exam: NORMAL INSPECTION





- Neurological Exam


Neurological Exam: Alert, Awake, Oriented x3





- Psychiatric Exam


Psychiatric exam: Depressed, Normal Affect





- Skin


Skin Exam: Dry, Normal Color, Warm





Assessment and Plan





- Assessment and Plan (Free Text)


Assessment: 





47 year old male with PMH bipolar disorder, schizoaffective d/o, prior suicide 

attempts, presents for paranoid thoughts, depression. Currently being treated 

in the psych moody for the above issues. Recently, pt found to be in sinus 

tachycardia likely 2/2 drug withdrawal:





Sinus Tachycardia:


- Patient has history of cocaine use.


- Repeat EKG was sinus tachycardia at 115 bpm.


- Patient asymptomatic: denies cp, sob, diaphoresis, n/v/abdominal pain, 

pleuritic cp.


- D-dimer negative. 


- Propranolol 10 mg bid praveena, please DO NOT discharge patient on this medication 

as he has a history of cocaine abuse. Discussed with patient the harmful 

effects of the medication with cocaine, agrees to be abstinent.





Drug abuse:


- Monitor for withdrawal


- Ativan prn


- Psych on board.





Psych conditions:


- as per psych management





Case seen and discussed with Dr Gil. Will sign off. Please reconsult as 

necessary.


Sparkle Starks, PGY1 





<Quinten Gil - Last Filed: 06/11/18 18:49>





Objective





- Vital Signs/Intake and Output


Vital Signs (last 24 hours): 


 











Temp Pulse Resp BP Pulse Ox


 


 97.8 F   116 H  17   126/87   100 


 


 06/11/18 09:00  06/11/18 17:32  06/11/18 09:00  06/11/18 17:32  06/03/18 14:56











- Medications


Medications: 


 Current Medications





Acetaminophen (Tylenol 325mg Tab)  650 mg PO Q4 PRN


   PRN Reason: Pain, Mild (1-3)


Al Hydrox/Mg Hydrox/Simethicone (Maalox Plus 30 Ml)  30 ml PO DAILY PRN


   PRN Reason: Upset Stomach


Benztropine Mesylate (Cogentin)  2 mg PO AMHS [facility]


   Last Admin: 06/11/18 10:55 Dose:  2 mg


Chlorpromazine (Thorazine)  50 mg PO QID PRN; Protocol


   PRN Reason: agitation/psychosis


   Last Admin: 06/11/18 10:11 Dose:  50 mg


Chlorpromazine (Thorazine)  50 mg IM QID PRN; Protocol


   PRN Reason: Agitation


Clonidine HCl (Catapres)  0.1 mg PO Q12H PRN


   PRN Reason: Other


   Last Admin: 06/11/18 00:37 Dose:  0.1 mg


Fluoxetine HCl (Prozac)  20 mg PO DAILY [facility]


   Last Admin: 06/11/18 10:56 Dose:  20 mg


Gabapentin (Neurontin)  600 mg PO QID [facility]


   PRN Reason: Protocol


   Last Admin: 06/11/18 13:25 Dose:  600 mg


Lorazepam (Ativan)  1 mg PO QID [facility]


   PRN Reason: Protocol


Magnesium Hydroxide (Milk Of Magnesia)  30 ml PO DAILY PRN


   PRN Reason: Constipation


Propranolol HCl (Inderal)  10 mg PO BID [facility]


   Last Admin: 06/11/18 17:32 Dose:  10 mg


Quetiapine Fumarate (Seroquel)  300 mg PO DAILY [facility]


   PRN Reason: Protocol


   Last Admin: 06/11/18 11:46 Dose:  300 mg


Quetiapine Fumarate (Seroquel)  350 mg PO HS [facility]


   PRN Reason: Protocol


   Last Admin: 06/10/18 21:42 Dose:  350 mg


Tamsulosin HCl (Flomax)  0.4 mg PO DAILY [facility]


   Last Admin: 06/11/18 10:56 Dose:  0.4 mg


Tramadol HCl (Ultram)  50 mg PO BID PRN


   PRN Reason: pain>=7/10


Zaleplon (Sonata)  10 mg PO HS PRN


   PRN Reason: Insomnia


   Last Admin: 06/09/18 21:36 Dose:  10 mg











- Labs


Labs: 


 





 06/11/18 09:15 





 06/11/18 09:15 











Attending/Attestation





- Attestation


I have personally seen and examined this patient.: Yes


I have fully participated in the care of the patient.: Yes


I have reviewed all pertinent clinical information, including history, physical 

exam and plan: Yes


Notes (Text): 





06/11/18 18:46





Attending note;





Patient seen and examined with resident in psychiatric floor.





Patient is a 47-year-old male with a history of alcohol abuse,opiate abuse,

benzodiazepine abuse, cocaine abuse is admitted to psychiatric floor for 

treatment.


Patient with anxiety symptoms. Continue Ativan.


Episodes of tachycardia. WBC count is normal. D-dimer is normal. No signs of 

infectious process.


Mostly secondary to drug withdrawal. T3-T4 normal.


EKG showed normal sinus rhythm/sinus tachycardia at times.





Patient is currently asymptomatic. Started on propranolol.


Needs drug and alcohol cessation.





Case discussed with patient in detail.





Monitor closely.





Follow-up with PMD  upon discharge.

## 2018-06-11 NOTE — PCM.PYCHPN
Psychiatric Progress Note





- Psychiatric Progress Note


Patient seen today, length of contact: 25 min


Patient Chief Complaint: 





"I need to be discharged today, my kid is going to  at 7pm" 


Problems Identified/Issues Discussed: 


attempted to discuss:


Suicide/ homicide prevention, past psychiatric h/o, current psychiatric symptoms

, medical problems, risk/benefits and alternatives of medications, medications 

compliance, coping strategies, substance abuse h/o, relapse prevention, 

importance of follow up with psychiatrist and therapist, discharge plan. 


Medical Problems: 


medical team was called for atelectasis in the CXR


Diagnostic Results: 














 06/03/18 11:34 





 06/03/18 11:34 





 Lab Results





06/04/18 07:00: RPR Nonreactive


06/04/18 07:00: Fasting Glucose 108, Triglycerides 335 H, Cholesterol 152, LDL 

Cholesterol Direct 76, HDL Cholesterol 31


06/03/18 14:53: Free T4 0.96, TSH 3rd Generation 0.33 L


06/03/18 13:15: Urine Opiates Screen Positive H, Urine Methadone Screen Negative

, Ur Barbiturates Screen Negative, Ur Phencyclidine Scrn Negative, Ur 

Amphetamines Screen Negative, U Benzodiazepines Scrn Positive H, U Oth Cocaine 

Metabols Positive H, U Cannabinoids Screen Negative


06/03/18 13:15: Urine Color Yellow, Urine Appearance Clear, Urine pH 5.5, Ur 

Specific Gravity >= 1.030, Urine Protein Negative, Urine Glucose (UA) Negative, 

Urine Ketones Negative, Urine Blood Negative, Urine Nitrate Negative, Urine 

Bilirubin Negative, Urine Urobilinogen 0.2, Ur Leukocyte Esterase Negative


06/03/18 11:34: Alcohol, Quantitative < 10


06/03/18 11:34: WBC 10.6  D, RBC 5.22, Hgb 16.1  D, Hct 45.1, MCV 86.4, MCH 30.8

, MCHC 35.7, RDW 13.2, Plt Count 205, MPV 11.4 H, Gran % 72.8 H, Lymph % (Auto) 

21.2 L, Mono % (Auto) 4.3, Eos % (Auto) 1.5, Baso % (Auto) 0.2, Gran # 7.69 H, 

Lymph # (Auto) 2.2, Mono # (Auto) 0.5, Eos # (Auto) 0.2, Baso # (Auto) 0.02


06/03/18 11:34: Salicylates < 1 L, Acetaminophen < 10.0 L


06/03/18 11:34: Sodium 145, Potassium 4.2, Chloride 109 H, Carbon Dioxide 23, 

Anion Gap 18, BUN 8, Creatinine 0.8, Est GFR (African Amer) > 60, Est GFR (Non-

Af Amer) > 60, Random Glucose 99, Calcium 9.3, Total Bilirubin 0.4, AST 39, ALT 

63 H, Alkaline Phosphatase 103, Total Protein 8.0, Albumin 4.7, Globulin 3.2, 

Albumin/Globulin Ratio 1.5











Vital Signs











  Temp Pulse Resp BP Pulse Ox


 


 06/05/18 06:37  97.6 F  102 H  19  117/87 


 


 06/05/18 03:35   91 H   125/74 


 


 06/04/18 16:00   91 H   125/74 


 


 06/04/18 07:34  97.8 F  90  20  126/94 H 


 


 06/03/18 17:50   96 H   148/91 H 


 


 06/03/18 16:09    16  


 


 06/03/18 14:56  98.1 F  96 H  16  148/91 H  100


 


 06/03/18 14:30  98.2 F  80  18  127/76  99


 


 06/03/18 12:38  98.4 F  87  18  152/85 H  98














 











Temp Pulse Resp BP Pulse Ox


 


 97.2 F L  93 H  20   108/84   100 


 


 06/06/18 07:13  06/06/18 07:13  06/06/18 07:13  06/06/18 07:13  06/03/18 14:56








DSM 5 Symptoms Update: 





shortly pt is 48yo male with h/o mental illness, most likely bipolar vs 

shizoaffective disorder, h/o noncompliance with the medications and follow up 

appointments, history of polysubstance abuse and dependence, history of 

involuntary commitment to [facility], patient was brought to 

the hospital by his friend for disorganized thoughts, patient also using some 

drugs, possible suicidal ideation, possible s/p suicidal attempt pt tried to 

hang himself in the bathroom in the hotel. patient is very familiar to this 

writer from the previous admission to the psychiatric inpatient unit, patient 

requires further hospitalization and stabilization to cause impulses are 

unpredictable, patient also verbalized thoughts of harming himself and others 

in the community.





Patient was seen and examined today at the treatment team room, pt is agitated, 

demanded to leave, not signing 48hr notice. 


pt was cursing at this writer, was telling "your f...ing drugs are making me 

like a zombie", pt was educated that he is the one who is constantly asking for 

medications, pt threw paper towards this writer and almost attacked PCP, needed 

to be medicated with Thorazine and ativan, was in quiet room. 





as per report pt still confused, psychotic, impulses unpredictable. 





pt tolerates meds well, no side effects observed or reported, AIMS 0, no EPS.





as per staff pt was able to tolerate food, ate breakfast and lunch, pt is 

intrusive. 





pt is tachycardic, was seen by medical team today. 





Impression:


r/o schizoaffective disorder


r/o substance induced psychosis


polysubstance abuse and dependence





Medication Change: Yes (ativan decreased 1mg qid, tramadol decreased)


Medical Record Reviewed: Yes (pharmacy, consults)


Consults ordered or reviewed: 





medical team recommended spirometer  for atelectasis, respiratory was called








ptw as seen by medical team for tachycardia, pt was started on propranolol, but 

pt should not be d/c on it due to cocaine abuse. 








Mental Status Examination





- Cognitive Function


Orientation: Person, Place


Memory: Impaired


Attention: Poor


Concentration: Poor


Association: Loose


Fund of Knowledge: Poor





- Mood


Mood: Depressed, Anxious





- Affect


Affect: Flat





- Formal Thought Process


Formal Thought Process: Hallucinations, Delusions, Paranoia, Loosening of 

associations, Circumstantial





- Suicidal Ideation


Suicidal Ideation: No





- Homicidal Ideation


Homicidal Ideation: No





Goal/Treatment Plan





- Goal/Treatment Plan


Need for Continued Stay: Remain at risks for inpatient hospitalization, Severe 

depression anxiety, Discharge may exacerbated symptoms, Severe functional 

impairment


Progress Toward Problem(s) and Goals/Treatment Plan: 


Milieu/structure/supportive therapy 


Medical consult called, pt has atelectasis, HTN, chronic pain


seroquel 300mg po daily 300mg po hs for psychosis and mood stabilization


PRN meds (thorazine, benadryl, tramadol, clonidine)


ativan was decreased to 1mg po qid for anxiety


propranolol was started by medical team


klonopin d/c 


ativan d/c


Sonata 10mg po hs prn for insomnia


prozac 20mg po daily for depression and anxiety


neurontin 600mg po qid for anxiety and mood stabilization 


SW consultation for discharge plan and social issues 


Family involvement, pt gave written consent for collaterals from mother


Follow up on labs 


Will monitor closely 


Pt was educated about risk/benefits and alternatives of medications, coping 

strategies (safety plan, suicide prevention), relapse prevention, importance of 

follow up with psychiatrist and therapist, stay away from drugs/alcohol/smoking





Estimated Date of D/C: 06/18/18 (will monitor closely)

## 2018-06-11 NOTE — CARD
--------------- APPROVED REPORT --------------





EKG Measurement

Heart [id number]

AK [id number]

[id number]

[id number]

[id number]



<Conclusion>

Normal sinus rhythm

Normal ECG

## 2018-06-12 VITALS — RESPIRATION RATE: 20 BRPM

## 2018-06-12 RX ADMIN — DIVALPROEX SODIUM SCH MG: 250 TABLET, DELAYED RELEASE ORAL at 21:06

## 2018-06-12 NOTE — PCM.[email or web address]
Psychiatric Progress Note





- Psychiatric Progress Note


Patient seen today, length of contact: 25 min


Patient Chief Complaint: 





"you have connection and power that is why you want to keep me here"


Problems Identified/Issues Discussed: 


attempted to discuss:


Suicide/ homicide prevention, past psychiatric h/o, current psychiatric symptoms

, medical problems, risk/benefits and alternatives of medications, medications 

compliance, coping strategies, substance abuse h/o, relapse prevention, 

importance of follow up with psychiatrist and therapist, discharge plan. 


Medical Problems: 


medical team was called for atelectasis in the CXR


Diagnostic Results: 














 06/03/18 11:34 





 06/03/18 11:34 





 Lab Results





06/04/18 07:00: RPR Nonreactive


06/04/18 07:00: Fasting Glucose 108, Triglycerides 335 H, Cholesterol 152, LDL 

Cholesterol Direct 76, HDL Cholesterol 31


06/03/18 14:53: Free T4 0.96, TSH 3rd Generation 0.33 L


06/03/18 13:15: Urine Opiates Screen Positive H, Urine Methadone Screen Negative

, Ur Barbiturates Screen Negative, Ur Phencyclidine Scrn Negative, Ur 

Amphetamines Screen Negative, U Benzodiazepines Scrn Positive H, U Oth Cocaine 

Metabols Positive H, U Cannabinoids Screen Negative


06/03/18 13:15: Urine Color Yellow, Urine Appearance Clear, Urine pH 5.5, Ur 

Specific Gravity >= 1.030, Urine Protein Negative, Urine Glucose (UA) Negative, 

Urine Ketones Negative, Urine Blood Negative, Urine Nitrate Negative, Urine 

Bilirubin Negative, Urine Urobilinogen 0.2, Ur Leukocyte Esterase Negative


06/03/18 11:34: Alcohol, Quantitative < 10


06/03/18 11:34: WBC 10.6  D, RBC 5.22, Hgb 16.1  D, Hct 45.1, MCV 86.4, MCH 30.8

, MCHC 35.7, RDW 13.2, Plt Count 205, MPV 11.4 H, Gran % 72.8 H, Lymph % (Auto) 

21.2 L, Mono % (Auto) 4.3, Eos % (Auto) 1.5, Baso % (Auto) 0.2, Gran # 7.69 H, 

Lymph # (Auto) 2.2, Mono # (Auto) 0.5, Eos # (Auto) 0.2, Baso # (Auto) 0.02


06/03/18 11:34: Salicylates < 1 L, Acetaminophen < 10.0 L


06/03/18 11:34: Sodium 145, Potassium 4.2, Chloride 109 H, Carbon Dioxide 23, 

Anion Gap 18, BUN 8, Creatinine 0.8, Est GFR (African Amer) > 60, Est GFR (Non-

Af Amer) > 60, Random Glucose 99, Calcium 9.3, Total Bilirubin 0.4, AST 39, ALT 

63 H, Alkaline Phosphatase 103, Total Protein 8.0, Albumin 4.7, Globulin 3.2, 

Albumin/Globulin Ratio 1.5











Vital Signs











  Temp Pulse Resp BP Pulse Ox


 


 06/05/18 06:37  97.6 F  102 H  19  117/87 


 


 06/05/18 03:35   91 H   125/74 


 


 06/04/18 16:00   91 H   125/74 


 


 06/04/18 07:34  97.8 F  90  20  126/94 H 


 


 06/03/18 17:50   96 H   148/91 H 


 


 06/03/18 16:09    16  


 


 06/03/18 14:56  98.1 F  96 H  16  148/91 H  100


 


 06/03/18 14:30  98.2 F  80  18  127/76  99


 


 06/03/18 12:38  98.4 F  87  18  152/85 H  98














 











Temp Pulse Resp BP Pulse Ox


 


 97.2 F L  93 H  20   108/84   100 


 


 06/06/18 07:13  06/06/18 07:13  06/06/18 07:13  06/06/18 07:13  06/03/18 14:56











 











Temp Pulse Resp BP Pulse Ox


 


 97.9 F   73   20   117/78   100 


 


 06/12/18 06:57  06/12/18 11:12  06/12/18 06:57  06/12/18 11:12  06/03/18 14:56








DSM 5 Symptoms Update: 





shortly pt is 46yo male with h/o mental illness, most likely bipolar vs 

shizoaffective disorder, h/o noncompliance with the medications and follow up 

appointments, history of polysubstance abuse and dependence, history of 

involuntary commitment to [facility] patient was brought to 

the hospital by his friend for disorganized thoughts, patient also using some 

drugs, possible suicidal ideation, possible s/p suicidal attempt pt tried to 

hang himself in the bathroom in the hotel. patient is very familiar to this 

writer from the previous admission to the psychiatric inpatient unit, patient 

requires further hospitalization and stabilization to cause impulses are 

unpredictable, patient also verbalized thoughts of harming himself and others 

in the community.





Patient was seen and examined today at the treatment team room, pt is was 

trying his best to hold himself and not to be agitated, it was successful. pt 

still paranoid, was feeling that this writer has some connection and her goal 

to hold pt to the hospital forever, at times pt was sarcastic, but no 

disrespectful or agitated behavior, last time pt was agitated was yesterday. 





as per report pt still confused, psychotic, paranoid, was feeling that there 

are some cameras and wires implanted in computer to monitor him, impulses 

unpredictable. 





pt tolerates meds well, no side effects observed or reported, AIMS 0, no EPS.





as per staff pt was able to tolerate food, ate breakfast and lunch, pt is 

intrusive. 





pt asked about mm relaxant, medical team notified.





Impression:


r/o schizoaffective disorder


r/o substance induced psychosis


polysubstance abuse and dependence


Medication Change: Yes (ativan decreased 1mg qid, tramadol decreased)


Medical Record Reviewed: Yes (pharmacy, consults)





Mental Status Examination





- Cognitive Function


Orientation: Person, Place


Memory: Impaired


Attention: Poor


Concentration: Poor


Association: Loose


Fund of Knowledge: Poor





- Mood


Mood: Depressed, Anxious





- Affect


Affect: Flat





- Formal Thought Process


Formal Thought Process: Hallucinations, Delusions, Paranoia, Loosening of 

associations, Circumstantial





- Suicidal Ideation


Suicidal Ideation: No





- Homicidal Ideation


Homicidal Ideation: No





Goal/Treatment Plan





- Goal/Treatment Plan


Need for Continued Stay: Remain at risks for inpatient hospitalization, Severe 

depression anxiety, Discharge may exacerbated symptoms, Severe functional 

impairment


Progress Toward Problem(s) and Goals/Treatment Plan: 


Milieu/structure/supportive therapy 


Medical consult called, pt has atelectasis, HTN, chronic pain


seroquel 400mg po daily 400mg po hs for psychosis and mood stabilization


PRN meds (thorazine, benadryl, tramadol, clonidine)


ativan was decreased to 1mg po tid for anxiety


propranolol was started by medical team


klonopin d/lucho 


ativan d/c


Sonata d/c


ambien 10mg hs


prozac 30mg po daily for depression and anxiety


neurontin 600mg po qid for anxiety and mood stabilization 


SW consultation for discharge plan and social issues 


Family involvement, pt gave written consent for collaterals from mother


Follow up on labs 


Will monitor closely 


Pt was educated about risk/benefits and alternatives of medications, coping 

strategies (safety plan, suicide prevention), relapse prevention, importance of 

follow up with psychiatrist and therapist, stay away from drugs/alcohol/smoking





Estimated Date of D/C: 06/18/18 (will monitor closely)

## 2018-06-13 RX ADMIN — DIVALPROEX SODIUM SCH MG: 500 TABLET, DELAYED RELEASE ORAL at 21:15

## 2018-06-13 RX ADMIN — DIVALPROEX SODIUM SCH MG: 250 TABLET, DELAYED RELEASE ORAL at 09:10

## 2018-06-13 NOTE — PCM.[email or web address]
Psychiatric Progress Note





- Psychiatric Progress Note


Patient seen today, length of contact: 30min


Patient Chief Complaint: 





"let me show you all the text messages, let me show you a facebook account, my 

wife was chasing me, I don't know why..." off note patient's wife has 

restraining order, patient wife is scared of the patient, as per family patient'

s wife doesn't want to be involved in to the patient life any longer.








Problems Identified/Issues Discussed: 


Suicide/ homicide prevention, past psychiatric h/o, current psychiatric symptoms

, medical problems, risk/benefits and alternatives of medications, medications 

compliance, coping strategies, substance abuse h/o, relapse prevention, 

importance of follow up with psychiatrist and therapist, discharge plan. 


Medical Problems: 


medical team was called for atelectasis in the CXR


consult appreciated





Diagnostic Results: 














 06/03/18 11:34 





 06/03/18 11:34 





 Lab Results





06/04/18 07:00: RPR Nonreactive


06/04/18 07:00: Fasting Glucose 108, Triglycerides 335 H, Cholesterol 152, LDL 

Cholesterol Direct 76, HDL Cholesterol 31


06/03/18 14:53: Free T4 0.96, TSH 3rd Generation 0.33 L


06/03/18 13:15: Urine Opiates Screen Positive H, Urine Methadone Screen Negative

, Ur Barbiturates Screen Negative, Ur Phencyclidine Scrn Negative, Ur 

Amphetamines Screen Negative, U Benzodiazepines Scrn Positive H, U Oth Cocaine 

Metabols Positive H, U Cannabinoids Screen Negative


06/03/18 13:15: Urine Color Yellow, Urine Appearance Clear, Urine pH 5.5, Ur 

Specific Gravity >= 1.030, Urine Protein Negative, Urine Glucose (UA) Negative, 

Urine Ketones Negative, Urine Blood Negative, Urine Nitrate Negative, Urine 

Bilirubin Negative, Urine Urobilinogen 0.2, Ur Leukocyte Esterase Negative


06/03/18 11:34: Alcohol, Quantitative < 10


06/03/18 11:34: WBC 10.6  D, RBC 5.22, Hgb 16.1  D, Hct 45.1, MCV 86.4, MCH 30.8

, MCHC 35.7, RDW 13.2, Plt Count 205, MPV 11.4 H, Gran % 72.8 H, Lymph % (Auto) 

21.2 L, Mono % (Auto) 4.3, Eos % (Auto) 1.5, Baso % (Auto) 0.2, Gran # 7.69 H, 

Lymph # (Auto) 2.2, Mono # (Auto) 0.5, Eos # (Auto) 0.2, Baso # (Auto) 0.02


06/03/18 11:34: Salicylates < 1 L, Acetaminophen < 10.0 L


06/03/18 11:34: Sodium 145, Potassium 4.2, Chloride 109 H, Carbon Dioxide 23, 

Anion Gap 18, BUN 8, Creatinine 0.8, Est GFR (African Amer) > 60, Est GFR (Non-

Af Amer) > 60, Random Glucose 99, Calcium 9.3, Total Bilirubin 0.4, AST 39, ALT 

63 H, Alkaline Phosphatase 103, Total Protein 8.0, Albumin 4.7, Globulin 3.2, 

Albumin/Globulin Ratio 1.5











Vital Signs











  Temp Pulse Resp BP Pulse Ox


 


 06/05/18 06:37  97.6 F  102 H  19  117/87 


 


 06/05/18 03:35   91 H   125/74 


 


 06/04/18 16:00   91 H   125/74 


 


 06/04/18 07:34  97.8 F  90  20  126/94 H 


 


 06/03/18 17:50   96 H   148/91 H 


 


 06/03/18 16:09    16  


 


 06/03/18 14:56  98.1 F  96 H  16  148/91 H  100


 


 06/03/18 14:30  98.2 F  80  18  127/76  99


 


 06/03/18 12:38  98.4 F  87  18  152/85 H  98














 











Temp Pulse Resp BP Pulse Ox


 


 97.2 F L  93 H  20   108/84   100 


 


 06/06/18 07:13  06/06/18 07:13  06/06/18 07:13  06/06/18 07:13  06/03/18 14:56











 











Temp Pulse Resp BP Pulse Ox


 


 97.9 F   73   20   117/78   100 


 


 06/12/18 06:57  06/12/18 11:12  06/12/18 06:57  06/12/18 11:12  06/03/18 14:56











 











Temp Pulse Resp BP Pulse Ox


 


 98.4 F   98 H  20   133/87   100 


 


 06/13/18 07:00  06/13/18 07:00  06/13/18 07:00  06/13/18 07:00  06/03/18 14:56








DSM 5 Symptoms Update: 





shortly pt is 48yo male with h/o mental illness, most likely bipolar vs 

shizoaffective disorder, h/o noncompliance with the medications and follow up 

appointments, history of polysubstance abuse and dependence, history of 

involuntary commitment to [facility], patient was brought to 

the hospital by his friend for disorganized thoughts, patient also using some 

drugs, possible suicidal ideation, possible s/p suicidal attempt pt tried to 

hang himself in the bathroom in the hotel. patient is very familiar to this 

writer from the previous admission to the psychiatric inpatient unit, patient 

requires further hospitalization and stabilization to cause impulses are 

unpredictable, patient also verbalized thoughts of harming himself and others 

in the community.





Patient was seen and examined today at the treatment team room, pt is was 

trying his best to hold himself and not to be agitated, pt is still paranoid, 

guarded, feels that his wife is chasing him, pt feels that she is braking into 

his facebook account, no prove for that. at the same time pt was agitated 

yesterday, did not sleep, needed IM with Thorazine. 





at the same time pt has some positive changes, was able to hold conversation, 

was apologetic for his disrespectful and agitated behavior, last time pt was 

agitated was yesterday hs.





as per report pt is less confused, even helped staff to calm other agitated pt 

yesterday. 





pt tolerates meds well, no side effects observed or reported, AIMS 0, no EPS.





as per staff pt was able to tolerate food, ate breakfast and lunch, has his 

appetite back. 





pt c/o pain in his leg, mobic started. 





Impression:


r/o schizoaffective disorder


r/o substance induced psychosis


polysubstance abuse and dependence


Medication Change: Yes (ativan decreased 1mg bid, prozac inceased, depakote 

increased)


Medical Record Reviewed: Yes (pharmacy, consults)


Consults ordered or reviewed: 





medical team recommended spirometer  for atelectasis, respiratory was called








ptw as seen by medical team for tachycardia, pt was started on propranolol, but 

pt should not be d/c on it due to cocaine abuse. 








Mental Status Examination





- Cognitive Function


Orientation: Person, Place


Memory: Impaired


Attention: Poor (some improvement)


Concentration: Poor (some improvement)


Association: Loose


Fund of Knowledge: Poor





- Mood


Mood: Depressed, Anxious





- Affect


Affect: Flat





- Formal Thought Process


Formal Thought Process: Hallucinations (denied), Delusions, Paranoia, Loosening 

of associations, Circumstantial





- Suicidal Ideation


Suicidal Ideation: No





- Homicidal Ideation


Homicidal Ideation: No





Goal/Treatment Plan





- Goal/Treatment Plan


Need for Continued Stay: Remain at risks for inpatient hospitalization, Severe 

depression anxiety, Discharge may exacerbated symptoms, Severe functional 

impairment


Progress Toward Problem(s) and Goals/Treatment Plan: 


Milieu/structure/supportive therapy 


Medical consult called, pt has atelectasis, HTN, chronic pain


seroquel 400mg po daily 400mg po hs for psychosis and mood stabilization


PRN meds (thorazine, benadryl, tramadol, clonidine)


ativan was decreased to 1mg po \bid for anxiety


propranolol was started by medical team, should not be dc on it


klonopin d/c 


ativan d/c


Sonata d/c


ambien 10mg hs


prozac 40mg po daily for depression and anxiety


neurontin 600mg po qid for anxiety and mood stabilization 


mobic prn for pain


SW consultation for discharge plan and social issues 


Family involvement, pt gave written consent for collaterals from mother


Follow up on labs 


Will monitor closely 


Pt was educated about risk/benefits and alternatives of medications, coping 

strategies (safety plan, suicide prevention), relapse prevention, importance of 

follow up with psychiatrist and therapist, stay away from drugs/alcohol/smoking








Patient was in agreement to have family meeting with his sister, patient does 

not want to go to inpatient rehabilitation.


Estimated Date of D/C: 06/18/18 (will monitor closely)

## 2018-06-14 VITALS — SYSTOLIC BLOOD PRESSURE: 117 MMHG | DIASTOLIC BLOOD PRESSURE: 78 MMHG | HEART RATE: 78 BPM | TEMPERATURE: 97.3 F

## 2018-06-14 RX ADMIN — DIVALPROEX SODIUM SCH MG: 500 TABLET, DELAYED RELEASE ORAL at 09:03
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
## 2018-06-16 ENCOUNTER — HOSPITAL ENCOUNTER (INPATIENT)
Dept: HOSPITAL 42 - ED | Age: 48
LOS: 11 days | Discharge: NURSING, REHAB OR LONG-TERM CARE | DRG: 238 | End: 2018-06-27
Attending: INTERNAL MEDICINE | Admitting: INTERNAL MEDICINE
Payer: MEDICAID

## 2018-06-16 VITALS — BODY MASS INDEX: 30.7 KG/M2

## 2018-06-16 DIAGNOSIS — F25.0: ICD-10-CM

## 2018-06-16 DIAGNOSIS — F41.9: ICD-10-CM

## 2018-06-16 DIAGNOSIS — F17.210: ICD-10-CM

## 2018-06-16 DIAGNOSIS — L97.519: ICD-10-CM

## 2018-06-16 DIAGNOSIS — N40.0: ICD-10-CM

## 2018-06-16 DIAGNOSIS — R23.0: ICD-10-CM

## 2018-06-16 DIAGNOSIS — L03.115: ICD-10-CM

## 2018-06-16 DIAGNOSIS — M86.8X7: Primary | ICD-10-CM

## 2018-06-16 DIAGNOSIS — G47.00: ICD-10-CM

## 2018-06-16 DIAGNOSIS — R21: ICD-10-CM

## 2018-06-16 DIAGNOSIS — K59.00: ICD-10-CM

## 2018-06-16 DIAGNOSIS — I51.7: ICD-10-CM

## 2018-06-16 DIAGNOSIS — Z91.041: ICD-10-CM

## 2018-06-16 LAB
ALBUMIN SERPL-MCNC: 3.9 G/DL (ref 3–4.8)
ALBUMIN/GLOB SERPL: 1.3 {RATIO} (ref 1.1–1.8)
ALT SERPL-CCNC: 33 U/L (ref 7–56)
APPEARANCE UR: CLEAR
AST SERPL-CCNC: 23 U/L (ref 17–59)
BASE EXCESS BLDV CALC-SCNC: 2.1 MMOL/L (ref 0–2)
BASOPHILS # BLD AUTO: 0.02 K/MM3 (ref 0–2)
BASOPHILS NFR BLD: 0.2 % (ref 0–3)
BILIRUB UR-MCNC: NEGATIVE MG/DL
BUN SERPL-MCNC: 6 MG/DL (ref 7–21)
CALCIUM SERPL-MCNC: 8.7 MG/DL (ref 8.4–10.5)
COLOR UR: YELLOW
EOSINOPHIL # BLD: 0.1 10*3/UL (ref 0–0.7)
EOSINOPHIL NFR BLD: 0.7 % (ref 1.5–5)
ERYTHROCYTE [DISTWIDTH] IN BLOOD BY AUTOMATED COUNT: 13.2 % (ref 11.5–14.5)
GFR NON-AFRICAN AMERICAN: > 60
GLUCOSE UR STRIP-MCNC: NEGATIVE MG/DL
GRANULOCYTES # BLD: 8.63 10*3/UL (ref 1.4–6.5)
GRANULOCYTES NFR BLD: 79.8 % (ref 50–68)
HGB BLD-MCNC: 13.3 G/DL (ref 14–18)
LEUKOCYTE ESTERASE UR-ACNC: NEGATIVE LEU/UL
LYMPHOCYTES # BLD: 1.5 10*3/UL (ref 1.2–3.4)
LYMPHOCYTES NFR BLD AUTO: 14.1 % (ref 22–35)
MCH RBC QN AUTO: 30 PG (ref 25–35)
MCHC RBC AUTO-ENTMCNC: 34.7 G/DL (ref 31–37)
MCV RBC AUTO: 86.5 FL (ref 80–105)
MONOCYTES # BLD AUTO: 0.6 10*3/UL (ref 0.1–0.6)
MONOCYTES NFR BLD: 5.2 % (ref 1–6)
PH BLDV: 7.47 [PH] (ref 7.32–7.43)
PH UR STRIP: 7 [PH] (ref 4.7–8)
PLATELET # BLD: 154 10^3/UL (ref 120–450)
PMV BLD AUTO: 11 FL (ref 7–11)
PROT UR STRIP-MCNC: NEGATIVE MG/DL
RBC # BLD AUTO: 4.43 10^6/UL (ref 3.5–6.1)
RBC # UR STRIP: NEGATIVE /UL
SP GR UR STRIP: <= 1.005 (ref 1–1.03)
UROBILINOGEN UR STRIP-ACNC: 0.2 E.U./DL
VENOUS BLOOD FIO2: 21 %
VENOUS BLOOD GAS PCO2: 35 (ref 40–60)
VENOUS BLOOD GAS PO2: 168 MM/HG (ref 30–55)
WBC # BLD AUTO: 10.8 10^3/UL (ref 4.5–11)

## 2018-06-16 NOTE — CP.PCM.HP
<Obed King - Last Filed: 06/16/18 22:57>





History of Present Illness





- History of Present Illness


History of Present Illness: 





This is a 46 yo M with PMH bipolar disorder, schizoaffective disorder, 

polysubstance abuse, and hx right leg compartment syndrome s/p fasciotomy who 

presents with complaint of right 5th toe swelling and pain which began 

overnight.  Was discharged against medical advice from Psych unit on 6/14/18.  

.  Patient reports pain at site only. Denies any hx of injection drug use, 

states last used 2 weeks ago.  Denies any hx of cellulitis in legs, denies any 

hx diabetes or immunocompromised conditions (i.e. HIV).  Denies recent trauma 

to site.  Denies systemic sx, including fevers, chills, nausea, generalized 

weakness/malaise, or fatigue.  Of note, on exam in ED, site acutely swollen 

with erythema extending into distal 1/3rd of foot, 2 other discrete areas of 

erythema and warmth appreciated on other aspects of the leg, and patient 

tachycardic to 130's.  All other ROS in 12-system review negative.  Patient 

reports compliance with all home medications.





PMD: Dr. Margie Field hsitory: denies


Allergies: Contrast dye


Family history: cancer on mother's side and mental disease on father's side


Social history: smokes 1/2 ppd for >20 years, denies alcohol or drug consumption





Present on Admission





- Present on Admission


Any Indicators Present on Admission: No





Review of Systems





- Constitutional


Constitutional: absent: Anorexia, Chills, Fever





- EENT


Eyes: absent: Blind Spots, Blurred Vision





- Cardiovascular


Cardiovascular: absent: Chest Pain, Dyspnea





- Respiratory


Respiratory: absent: Cough, Dyspnea





- Gastrointestinal


Gastrointestinal: absent: Abdominal Pain, Diarrhea, Nausea, Vomiting





- Genitourinary


Genitourinary: absent: Dysuria, Urinary Incontinence





- Musculoskeletal


Musculoskeletal: absent: Atrophy, Back Pain





- Neurological


Neurological: Abnormal Gait.  absent: Dizziness, Numbness, Headaches





- Psychiatric


Psychiatric: Anxiety





- Hematologic/Lymphatic


Hematologic: absent: Easy Bleeding





Past Patient History





- Infectious Disease


Hx of Infectious Diseases: None





- Tetanus Immunizations


Tetanus Immunization: Unknown





- Past Medical History & Family History


Past Medical History?: Yes





- Past Social History


Smoking Status: Heavy Smoker > 10 Cigarettes Daily





- CARDIAC


Hx Hypertension: No





- PULMONARY


Hx Respiratory Disorders: No


Hx Tuberculosis: No





- NEUROLOGICAL


Hx Seizures: No





- HEENT


Hx HEENT Problems: No





- RENAL


Hx Chronic Kidney Disease: No





- ENDOCRINE/METABOLIC


Hx Endocrine Disorders: No





- HEMATOLOGICAL/ONCOLOGICAL


Hx Blood Disorders: No





- INTEGUMENTARY


Hx Dermatological Problems: No (INCISION AND DRAINAGE TO BACK OF NECK AREA. 

CELLULITIS-)





- MUSCULOSKELETAL/RHEUMATOLOGICAL


Hx Falls: No


Other/Comment: Brace to right leg, Hx. fasciectomy.





- GASTROINTESTINAL


Hx Gastrointestinal Disorders: No





- GENITOURINARY/GYNECOLOGICAL


Hx Sexually Transmitted Disorders: No





- PSYCHIATRIC


Hx Bipolar Disorder: Yes


Hx Depression: Yes


Hx Substance Use: Yes





- SURGICAL HISTORY


Hx Orthopedic Surgery: Yes (Fasciotomy (R Leg))





- ANESTHESIA


Hx Anesthesia: Yes


Hx Anesthesia Reactions: No


Hx Malignant Hyperthermia: No





Meds


Allergies/Adverse Reactions: 


 Allergies











Allergy/AdvReac Type Severity Reaction Status Date / Time


 


ct dye Allergy  ITCHING Uncoded 06/04/18 10:22














Physical Exam





- Head Exam


Head Exam: ATRAUMATIC, NORMAL INSPECTION, NORMOCEPHALIC





- Eye Exam


Eye Exam: EOMI, Normal appearance





- ENT Exam


ENT Exam: Mucous Membranes Moist





- Neck Exam


Neck exam: Positive for: Normal Inspection





- Respiratory Exam


Respiratory Exam: Clear to Auscultation Bilateral, NORMAL BREATHING PATTERN.  

absent: Rhonchi, Wheezes





- Cardiovascular Exam


Cardiovascular Exam: REGULAR RHYTHM, +S1, +S2





- GI/Abdominal Exam


GI & Abdominal Exam: Normal Bowel Sounds, Soft





- Extremities Exam


Extremities exam: Negative for: normal inspection (right lower leg has 

decreased muscle tone s/p fasciotomy)





- Back Exam


Back exam: NORMAL INSPECTION





- Neurological Exam


Neurological exam: Alert, Oriented x3





- Psychiatric Exam


Psychiatric exam: Normal Affect, Normal Mood





- Skin


Skin Exam: Erythema (right foot and calf), Warm





Results





- Vital Signs


Recent Vital Signs: 





 Last Vital Signs











Temp  98.6 F   06/16/18 16:09


 


Pulse  96 H  06/16/18 21:07


 


Resp  18   06/16/18 21:07


 


BP  148/79   06/16/18 21:07


 


Pulse Ox  98   06/16/18 21:07














- Labs


Result Diagrams: 


 06/16/18 16:30





 06/16/18 16:30





Assessment & Plan





- Assessment and Plan (Free Text)


Assessment: 





his is a 46 yo M with PMH bipolar disorder, schizoaffective disorder, 

polysubstance abuse, and hx right leg compartment syndrome s/p fasciotomy who 

presents with complaint of right 5th toe swelling and pain which began 

overnight found to have possible cellulitis. 


Plan: 








Right foot Cellulitis secondary to trauma s/p loss of sensation of right foot


-Patient does not currently have a white count, continue to monitor


-Monitor temperature; currently afebrile


-CRP, BMP, CBC ordered for morning, results pending


-X-ray of right foot ordered; results pending


-Continue with vancomycin





BPH


-Continue with flomax





Mood disorder


-Continue with Seroquel and fluoxetine





Parasthesias in right lower extremity


-Continue with neurontin





Insomnia


-Ambien PRN





DVT/GI prophylaxis: Heparin/Protonix





<Becky Vasquez - Last Filed: 06/17/18 03:50>





Results





- Vital Signs


Recent Vital Signs: 





 Last Vital Signs











Temp  98.6 F   06/16/18 16:09


 


Pulse  96 H  06/16/18 21:07


 


Resp  18   06/16/18 21:07


 


BP  148/79   06/16/18 21:07


 


Pulse Ox  98   06/16/18 21:07














- Labs


Result Diagrams: 


 06/16/18 16:30





 06/16/18 16:30





Attending/Attestation





- Attestation


I have personally seen and examined this patient.: Yes


I have fully participated in the care of the patient.: Yes


I have reviewed all pertinent clinical information: Yes


Notes (Text): 





06/17/18 03:49


Patient was seen when he was in [id number].


Agree with history, physical examination, assessment and plan.


States that he did cocaine and ecstasy 2 weeks ago.

## 2018-06-16 NOTE — ED PDOC
Arrival/HPI





- General


Historian: Patient, Family





- History of Present Illness


Time/Duration: Other (1 day)


Symptom Onset: Sudden


Symptom Course: Worsening


Severity Level: 3 (only on palpation)





<Yobany Miller - Last Filed: 06/16/18 17:36>





<Megan Greco - Last Filed: 06/17/18 13:00>





- General


Chief Complaint: Lower Extremity Problem/Injury


Time Seen by Provider: 06/16/18 15:56





- History of Present Illness


Narrative History of Present Illness (Text): 





06/16/18 17:17


This is a 48 yo M with PMH bipolar disorder, schizoaffective disorder, 

polysubstance abuse, and hx right leg compartment syndrome s/p fasciotomy who 

presents with complaint of right 5th toe swelling and pain.  Was discharged 

against medical advice from Psych unit on 6/14/18.  As per family and patient 

at bedside, patient was acutely twitching and shaking yesterday, but this AM, 

noted pain in the right 5th toe.  Family examined and noted it to be swollen 

and erythematous, concerning for infection.  Patient reports pain at site only, 

unable to use foot after fasciotomy (walks with use of brace on R leg), 

otherwise no sensation in foot.  Denies any hx of injection drug use, states 

last used 2 weeks ago.  Denies any hx of cellulitis in legs, denies any hx 

diabetes or immunocompromised conditions (i.e. HIV).  Denies recent trauma to 

site.  Denies systemic sx, including fevers, chills, nausea, generalized 

weakness/malaise, or fatigue.  Of note, on exam in ED, site acutely swollen 

with erythema extending into distal 1/3rd of foot, 2 other discrete areas of 

erythema and warmth appreciated on other aspects of the leg, and patient 

tachycardic to 130's.  All other ROS in 12-system review negative.  Patient 

reports compliance with all home medications.





PMH: as above


PSH: denies


FH: denies


SH: Smokes cigarettes 1 PPD for >20 years.  Denies any alcohol or illicits 

since discharge 6/14/18, last used cocaine (snort) 2 weeks prior, previously 

reported drinking 8 beers daily as per prior charting





PMD: Dr. Hanson


 (Yobany Miller)





Past Medical History





- Provider Review


Nursing Documentation Reviewed: Yes





- Past History


Past History: No Previous





- Infectious Disease


Hx of Infectious Diseases: None





- Tetanus Immunization


Tetanus Immunization: Unknown





- Past Medical History


Past Medical History: No Previous





- Cardiac


Hx Hypertension: No





- Pulmonary


Hx Respiratory Disorders: No


Hx Tuberculosis: No





- Neurological


Hx Seizures: No





- HEENT


Hx HEENT Disorder: No





- Renal


Hx Renal Disorder: No





- Endocrine/Metabolic


Hx Endocrine Disorders: No





- Hematological/Oncological


Hx Blood Disorders: No





- Integumentary


Hx Dermatological Disorder: No (INCISION AND DRAINAGE TO BACK OF NECK AREA. 

CELLULITIS-)





- Musculoskeletal/Rheumatological


Hx Falls: No


Other/Comment: Brace to right leg, Hx. fasciectomy.





- Gastrointestinal


Hx Gastrointestinal Disorders: No





- Genitourinary/Gynecological


Hx Sexually Transmitted Diseases: No





- Psychiatric


Hx Bipolar Disorder: Yes


Hx Depression: Yes


Hx Substance Use: Yes





- Past Surgical History


Past Surgical History: Non-Contributing





- Surgical History


Hx Orthopedic Surgery: Yes (Fasciotomy (R Leg))





- Anesthesia


Hx Anesthesia: Yes


Hx Anesthesia Reactions: No


Hx Malignant Hyperthermia: No





- Suicidal Assessment


Feels Threatened In Home Enviroment: No





<Yobany Miller - Last Filed: 06/16/18 17:36>





Family/Social History





- Physician Review


Nursing Documentation Reviewed: Yes


Family/Social History: No Known Family HX


Smoking Status: Heavy Smoker > 10 Cigarettes Daily


Hx Alcohol Use: Yes


Hx Substance Use: Yes


Substance used: HEROIN, COCAINE, OXY


Hx Substance Use Treatment: No





<Yobany Miller - Last Filed: 06/16/18 17:36>





Allergies/Home Meds





<Yobany Miller - Last Filed: 06/16/18 17:36>





<Megan Greco - Last Filed: 06/17/18 13:00>


Allergies/Adverse Reactions: 


Allergies





ct dye Allergy (Uncoded 06/04/18 10:22)


 ITCHING


 AS PER PATIENT  











Review of Systems





- Physician Review


All systems were reviewed & negative as marked: Yes (as per HPI)





- Review of Systems


Constitutional: Normal.  absent: Fatigue


Eyes: Normal.  absent: Vision Changes


ENT: Normal.  absent: Sore Throat, Rhinorrhea


Respiratory: Normal.  absent: SOB, Cough


Cardiovascular: Normal.  absent: Chest Pain, COLEMAN, Syncope


Gastrointestinal: Normal.  absent: Abdominal Pain, Nausea, Vomiting


Genitourinary Male: Normal.  absent: Dysuria





<Yobany Miller - Last Filed: 06/16/18 17:36>





Physical Exam


Vital Signs Reviewed: Yes


Temperature: Afebrile


Blood Pressure: Normal


Pulse: Tachycardic


Respiratory Rate: Normal


Appearance: Positive for: Non-Toxic, Comfortable, Unkept


Pain Distress: Mild


Mental Status: Positive for: Alert and Oriented X 3





- Systems Exam


Head: Present: Atraumatic, Normocephalic


Pupils: Present: Other (Mildly dilated pupils but equally reactive to light 

bilaterally).  No: Sluggish, Non-Reactive, Pinpoint


Extroacular Muscles: Present: EOMI.  No: Gaze Palsy, Entrapment


Conjunctiva: Present: Normal.  No: Injected, Icteric


Mouth: Present: Moist Mucous Membranes, Normal Lips, Normal Tounge, Normal 

Teeth.  No: Dry, Drooling


Nose (External): Present: Atraumatic.  No: Abrasion, Laceration


Nose (Internal): Present: No Active Bleeding.  No: Epistaxis


Neck: Present: Normal Range of Motion, Trachea Midline.  No: MIDLINE TENDERNESS

, JVD


Respiratory/Chest: Present: Clear to Auscultation, Good Air Exchange.  No: 

Respiratory Distress, Accessory Muscle Use, Wheezes, Rales


Cardiovascular: Present: Normal S1, S2, Peripheal Pulses Present (+2 radials 

and dorsalis pedis bilaterally), Tachycardic.  No: Regular Rate and Rhythm, 

Murmurs, Irregular Rhythm


Abdomen: Present: Normal Bowel Sounds.  No: Tenderness, Distention, Mass/

Organomegaly


Upper Extremity: Present: Normal Inspection, Normal ROM, NORMAL PULSES.  No: 

Cyanosis, Edema, Tenderness, Swelling, Erythema, Deformity


Lower Extremity: Present: NORMAL PULSES, Tenderness (tenderness at all 3 

erythematous spots), Erythema (3 discrete spots of tender erythema, at and 

around R 5th digit, 2x5cm erythema along lower lateral aspect of RLE, 2x3cm 

erythema along upper medial aspect of RLE below knee, only 5th digit area 

swollen), Deformity (wasting of RLE as compared to LLE below the knee).  No: 

CALF TENDERNESS, Cyanosis, Normal ROM (unable to move RLE below knee), Swelling


Neurological: Present: GCS=15, Speech Normal, Normal Sensory Function.  No: 

Motor Func Grossly Intact (loss of RLE below knee motor, otherwise grossly 

intact)


Skin: Present: Warm, Dry, Normal Color (except as documented in Lower Extremity 

exam).  No: Rashes


Psychiatric: Present: Alert, Oriented x 3, Normal Mood, Other (intermittently 

hearing voices (reports this as baseline)).  No: Normal Insight, Normal 

Concentration





<Yobany Miller - Last Filed: 06/16/18 17:36>





<Megan Greco - Last Filed: 06/17/18 13:00>


Vital Signs











  Temp Pulse Resp BP Pulse Ox


 


 06/16/18 21:07   96 H  18  148/79  98


 


 06/16/18 20:22   108 H  18  146/77  100


 


 06/16/18 16:09  98.6 F  127 H  18  130/83  95














Medical Decision Making


Reassessment Condition: Re-examined, Unchanged





<Yobany Miller - Last Filed: 06/16/18 17:36>





<Megan Greco - Last Filed: 06/17/18 13:00>


ED Course and Treatment: 





06/16/18 17:51


Ddx: LE cellulitis, r/o sepsis; tachycardia 2/2 medications vs sepsis





EKG ordered due to tachycardic to 130's on intake, notable for Sinus Tachy at 

122, intervals normal


CBC, CMP, Mg, Phos, VBG with lactate ordered to assess for infectious process, 

metabolic derangements


1L NS bolus ordered for tachycardia


X-ray R foot to assess for subcutaneous gas, possible early osteomyolitis


Toradol 60mg IM x1 for pain control





F/u and reassess for dispo





06/16/18 19:59


X-ray obtained, no apparent bony involvement or subcutaneous gas but still 

pending official read


Starting Vancomycin for MRSA cellulitis involvement given recent prior 

hospitalization


Labs not notable for sepsis picture, WBCs 10.8, Lactate 1.8, remains afebrile


Tachycardia not resolved with 1L NS, ordering another 1L bolus and giving 

Librium 25mg x1 for possible withdrawal





Case discussed with Hospitalist on call, Dr. Gil, for admission.  Agrees 

with admission, will endorse to oncoming night team.  To be admitted to Remote-

telemetry due to ongoing tachycardia.





Patient seen, reviewed, and discussed with attending, Dr. Greco (Yobany Miller)


06/16/18 20:37





Yony Garcia is a 47 year old male, who presents to the emergency 

department for a complaint of pain and swelling to the right, lowe exremity 5th 

digit. In agreement with resident note, which includes further HPI details. 

Patient was seen and evaluated with resident, came up with plan and treatment 

together.  (Megan Greco)





- Lab Interpretations


Lab Results: 








 06/16/18 16:30 





 06/16/18 16:30 





 Lab Results





06/16/18 18:00: ESR 35 H


06/16/18 18:00: C-React Prot High Sens > 15.00 H, Alcohol, Quantitative < 10


06/16/18 17:05: Urine Opiates Screen Negative, Urine Methadone Screen Negative, 

Ur Barbiturates Screen Negative, Ur Phencyclidine Scrn Negative, Ur 

Amphetamines Screen Negative, U Benzodiazepines Scrn Negative, U Oth Cocaine 

Metabols Negative, U Cannabinoids Screen Negative


06/16/18 17:05: Urine Color Yellow, Urine Appearance Clear, Urine pH 7.0, Ur 

Specific Gravity <= 1.005, Urine Protein Negative, Urine Glucose (UA) Negative, 

Urine Ketones Negative, Urine Blood Negative, Urine Nitrate Negative, Urine 

Bilirubin Negative, Urine Urobilinogen 0.2, Ur Leukocyte Esterase Negative


06/16/18 16:30: Sodium 142, Chloride 106, Potassium 3.6, Carbon Dioxide 24, 

Anion Gap 15, BUN 6 L, Creatinine 0.6 L, Est GFR ( Amer) > 60, Est GFR (

Non-Af Amer) > 60, Random Glucose 138 H, Calcium 8.7, Phosphorus 2.4 L, 

Magnesium 1.9, Total Bilirubin 0.2, AST 23, ALT 33, Alkaline Phosphatase 86, 

Total Protein 6.8, Albumin 3.9, Globulin 2.9, Albumin/Globulin Ratio 1.3


06/16/18 16:30: WBC 10.8, RBC 4.43, Hgb 13.3 L, Hct 38.3 L, MCV 86.5, MCH 30.0, 

MCHC 34.7, RDW 13.2, Plt Count 154, MPV 11.0, Gran % 79.8 H, Lymph % (Auto) 

14.1 L, Mono % (Auto) 5.2, Eos % (Auto) 0.7 L, Baso % (Auto) 0.2, Gran # 8.63 H

, Lymph # (Auto) 1.5, Mono # (Auto) 0.6, Eos # (Auto) 0.1, Baso # (Auto) 0.02


06/16/18 16:30: pO2 168 H, VBG pH 7.47 H, VBG pCO2 35.0 L, VBG HCO3 25.5, VBG 

Total CO2 26.6, VBG O2 Sat (Calc) 100.0 H, VBG Base Excess 2.1 H, VBG Potassium 

3.6, Sodium 140.0, Chloride 109.0 H, Glucose 141 H, Lactate 1.8, FiO2 21.0, 

Venous Blood Potassium 3.6











- RAD Interpretation


Radiology Orders: 








06/16/18 16:32


FOOT RIGHT 3 VIEWS ROUTINE [RAD] Stat 














- Medication Orders


Current Medication Orders: 








Acetaminophen (Tylenol 325mg Tab)  650 mg PO Q6H PRN


   PRN Reason: Pain, moderate (4-7)


Benztropine Mesylate (Cogentin)  2 mg PO [facility]


   Last Admin: 06/17/18 09:45  Dose: 2 mg





Clotrimazole (Lotrimin 1%)  0 gm TOP BID [facility]


   Last Admin: 06/17/18 09:50  Dose: 1 appl





Divalproex Sodium (Depakote Dr(*Bid*))  500 mg PO [facility]


   Last Admin: 06/17/18 09:48  Dose: 500 mg





Behavioural


 Document     06/17/18 09:48  PHANT  (Rec: 06/17/18 09:48  [place]  [facility] [id number]


     Maintenance


      Maintenance Dose                           Yes


     Nonmedicinal


      Nonmedicinal Interventions                 Redirect


                                                 Therapeutic Communication


                                                 Activity


                                                 Give food/fluids


     Behavior


      Behavior for Medication:                   Anxiety





Docusate Sodium (Colace)  100 mg PO TID [facility]


   Last Admin: 06/17/18 09:45  Dose: 100 mg





Last Bowel Movement


 Document     06/17/18 09:45  PHANT  (Rec: 06/17/18 09:46  PHANT  [facility] [id number]


     Last Bowel Movement


      Last Bowel Movement                        06/16/18





Fluoxetine HCl (Prozac)  40 mg PO DAILY [facility]


   Last Admin: 06/17/18 09:45  Dose: 40 mg





Gabapentin (Neurontin)  600 mg PO QID [facility]


   PRN Reason: Protocol


   Last Admin: 06/17/18 09:46  Dose: 600 mg





Behavioural


 Document     06/17/18 09:46  PHANT  (Rec: 06/17/18 09:46  PHANT  [facility] [id number]


     Maintenance


      Maintenance Dose                           Yes


     Nonmedicinal


      Nonmedicinal Interventions                 Therapeutic Communication


                                                 Activity


                                                 Give food/fluids


     Behavior


      Behavior for Medication:                   Anxiety





Heparin Sodium (Porcine) (Heparin)  5,000 units SC Q12 HOME


   PRN Reason: Protocol


   Last Admin: 06/17/18 09:45  Dose: 5,000 units





Subcutaneous Administrations


 Document     06/17/18 09:45  PHANT  (Rec: 06/17/18 09:45  PHANT  [id number])


     Injection Site


      MAR Injection Site                         Right Arm


     Charges for Administration


      # of Subcutaneous Administrations          1





Vancomycin HCl (Vancomycin 1gm)  1 gm in 250 mls @ 167 mls/hr IVPB DAILY HOME


   PRN Reason: Protocol


   Last Admin: 06/17/18 09:49  Dose: 167 mls/hr





eMAR Start Stop


 Document     06/17/18 09:49  PHANT  (Rec: 06/17/18 09:49  PHANT  [id number])


     Intravenous Solution


      Start Date                                 06/17/18


      Start Time                                 09:49


      End Date                                   06/17/18


      End time                                   11:19


      Total Infusion Time                        90





Ketorolac Tromethamine (Toradol)  15 mg IVP Q6 PRN


   PRN Reason: Pain, severe (8-10)


Meloxicam (Mobic)  7.5 mg PO DAILY [facility]


   Last Admin: 06/17/18 09:47  Dose: 7.5 mg





MAR Pain Assessment


 Document     06/17/18 09:47  PHANT  (Rec: 06/17/18 09:47  PHANT  [facility] [id number]


     Pain Reassessment


      Is this a pain reassessment?               Yes


     Sleep


      Is patient sleeping during reassessment?   No


     Presence of Pain


      Presence of Pain                           Yes


     Pain Scale Used


      Pain Scale Used                            Numeric


     Location


      Left, Right or Bilateral                   Right


      Pain Location Body Site                    Foot


     Description


      Description                                Sharp


      Intensity of Pain at present               8


      Acceptable Level of Pain                   3


      Radiation Location                         no


      Site Observation                           swelling


      Pain Behavior                              Facial Grimacing


      Alleviating Factors/Management             Position Change


       Techniques                                


      Alleviating Factors                        Medication





Morphine Sulfate (Morphine)  2 mg IVP Q6 PRN


   PRN Reason: Pain, moderate (4-7)


   Last Admin: 06/17/18 08:29  Dose: 2 mg





MAR Pain Assessment


 Document     06/17/18 08:29  PHANT  (Rec: 06/17/18 08:33  PHANT  [id number])


     Pain Reassessment


      Is this a pain reassessment?               No


     Sleep


      Is patient sleeping during reassessment?   No


     Presence of Pain


      Presence of Pain                           Yes


     Pain Scale Used


      Pain Scale Used                            Numeric


     Location


      Left, Right or Bilateral                   Right


      Pain Location Body Site                    Foot


     Description


      Description                                Acute


      Intensity of Pain at present               10


      Acceptable Level of Pain                   3


      Radiation Location                         no


      Site Observation                           swelling


      Pain Behavior                              Restlessness


                                                 Facial Grimacing


      Aggravating Factors                        Changing Position


                                                 Standing


                                                 Walking


      Alleviating Factors/Management             Medication


       Techniques                                


      Alleviating Factors                        Medication


      Effects of Pain                            cooperate with care


IVP Administration


 Document     06/17/18 08:29  PHANT  (Rec: 06/17/18 08:33  PHANT  [facility] [id number]


     Charges for Administration


      # of IVP Administrations                   1





Pantoprazole Sodium (Protonix Inj)  40 mg IVP DAILY [facility]


   Last Admin: 06/17/18 09:48  Dose: 40 mg





IVP Administration


 Document     06/17/18 09:48  PHANT  (Rec: 06/17/18 09:48  PHANT  [facility] [id number]


     Charges for Administration


      # of IVP Administrations                   1





Polyethylene Glycol (Miralax)  17 gm PO DAILY [facility]


   Last Admin: 06/17/18 09:45  Dose: 17 gm





[facility])  [facility]


   PRN Reason: Protocol


   Last Admin: 06/17/18 09:46  Dose: 400 mg





Behavioural


 Document     06/17/18 09:46  PHANT  (Rec: 06/17/18 09:46  PHANT  [facility] [id number]


     Maintenance


      Maintenance Dose                           Yes


     Nonmedicinal


      Nonmedicinal Interventions                 Therapeutic Communication


                                                 Activity


                                                 Give food/fluids


     Behavior


      Behavior for Medication:                   Anxiety





Tamsulosin HCl (Flomax)  0.4 mg PO DAILY [facility]


   Last Admin: 06/17/18 09:48  Dose: 0.4 mg





Zolpidem Tartrate (Ambien)  5 mg PO HS PRN; Protocol


   PRN Reason: Insomnia


   Last Admin: 06/16/18 23:48  Dose: 5 mg





Behavioural


 Document     06/16/18 23:48  CDL  (Rec: 06/16/18 23:48  CDL  [id number])


     Maintenance


      Maintenance Dose                           Yes


     Behavior


      Behavior for Medication:                   Insomnia


Re-Assess: Reassess Psych Meds


 Document     06/17/18 00:48  CDL  (Rec: 06/17/18 04:40  CDL  [id number])


      Reassess Psych Med                         Effective








Discontinued Medications





Chlordiazepoxide (Librium)  25 mg PO STAT STA


   PRN Reason: Protocol


   Stop: 06/16/18 19:11


   Last Admin: 06/16/18 19:41  Dose: 25 mg





Gabapentin (Neurontin)  300 mg PO ONCE ONE


   PRN Reason: Protocol


   Stop: 06/17/18 01:22


   Last Admin: 06/17/18 01:42  Dose: 300 mg





Behavioural


 Document     06/17/18 01:42  CDL  (Rec: 06/17/18 01:42  CDL  [id number])


     Maintenance


      Maintenance Dose                           Yes


     Behavior


      Behavior for Medication:                   Anxiety





Hydromorphone HCl (Dilaudid)  0.5 mg IVP STAT STA


   Stop: 06/16/18 18:31


   Last Admin: 06/16/18 19:09  Dose: 0.5 mg





MAR Pain Assessment


 Document     06/16/18 19:09  GMD  (Rec: 06/16/18 19:09  D  [id number])


     Pain Reassessment


      Is this a pain reassessment?               No


IVP Administration


 Document     06/16/18 19:09  GMD  (Rec: 06/16/18 19:09  D  [id number])


     Charges for Administration


      # of IVP Administrations                   1





Hydromorphone HCl (Dilaudid)  0.5 mg IVP STAT STA


   Stop: 06/16/18 23:43


   Last Admin: 06/16/18 23:48  Dose: 0.5 mg





MAR Pain Assessment


 Document     06/16/18 23:48  CDL  (Rec: 06/16/18 23:49  CDL  [id number])


     Pain Reassessment


      Is this a pain reassessment?               No


     Sleep


      Is patient sleeping during reassessment?   No


     Presence of Pain


      Presence of Pain                           Yes


     Pain Scale Used


      Pain Scale Used                            Numeric


     Location


      Left, Right or Bilateral                   Right


      Pain Location Body Site                    Leg


                                                 Ankle


                                                 Foot


     Description


      Description                                Constant


      Intensity of Pain at present               9


      Pain Behavior                              Restlessness


      Alleviating Factors/Management             Medication


       Techniques                                


      Alleviating Factors                        Medication


IVP Administration


 Document     06/16/18 23:48  CDL  (Rec: 06/16/18 23:49  CDL  [id number])


     Charges for Administration


      # of IVP Administrations                   1


Re-Assess: MAR Pain Assessment


 Document     06/17/18 00:48  CDL  (Rec: 06/17/18 06:48  CDL  [id number])


     Pain Reassessment


      Is this a pain reassessment?               Yes


     Sleep


      Is patient sleeping during reassessment?   Yes





Sodium Chloride (Sodium Chloride 0.9%)  1,000 mls @ 999 mls/hr IV .Q1H1M STA


   Stop: 06/16/18 17:33


   Last Admin: 06/16/18 17:03  Dose: 999 mls/hr





eMAR Start Stop


 Document     06/16/18 17:03  GMD  (Rec: 06/16/18 17:03  GMD  [id number])


     Intravenous Solution


      Start Date                                 06/16/18


      Start Time                                 17:03


      End Date                                   06/16/18


      End time                                   18:04


      Total Infusion Time                        61





Vancomycin HCl 2 gm/ Sodium (Chloride)  500 mls @ 170 mls/hr IVPB ONCE ONE


   PRN Reason: Protocol


   Stop: 06/16/18 21:01


   Last Admin: 06/16/18 19:06  Dose: 170 mls/hr





eMAR Start Stop


 Document     06/16/18 19:06  GMD  (Rec: 06/16/18 19:06  D  [id number])


     Intravenous Solution


      Start Date                                 06/16/18


      Start Time                                 19:06


      End Date                                   06/16/18


      End time                                   22:03


      Total Infusion Time                        177





Sodium Chloride (Sodium Chloride 0.9%)  1,000 mls @ 999 mls/hr IV .Q1H1M STA


   Stop: 06/16/18 20:10


   Last Admin: 06/16/18 19:41  Dose: 999 mls/hr





eMAR Start Stop


 Document     06/16/18 19:41  GMD  (Rec: 06/16/18 19:42  GMD  [id number])


     Intravenous Solution


      Start Date                                 06/16/18


      Start Time                                 19:41


      End Date                                   06/16/18


      End time                                   20:42


      Total Infusion Time                        61





Ketorolac Tromethamine (Toradol)  60 mg IM STAT STA


   Stop: 06/16/18 16:43


   Last Admin: 06/16/18 17:02  Dose: 60 mg





MAR Pain Assessment


 Document     06/16/18 17:02  GMD  (Rec: 06/16/18 17:03  D  [id number])


     Pain Reassessment


      Is this a pain reassessment?               No


IM Administration Charges


 Document     06/16/18 17:02  GMD  (Rec: 06/16/18 17:03  GMD  [id number])


     Charges for Administration


      # of IM Administrations                    1





Ketorolac Tromethamine (Toradol)  30 mg IVP ONCE ONE


   Stop: 06/17/18 03:45


   Last Admin: 06/17/18 04:57  Dose: 30 mg





MAR Pain Assessment


 Document     06/17/18 04:57  CDL  (Rec: 06/17/18 04:57  CD  [id number])


     Pain Reassessment


      Is this a pain reassessment?               No


     Sleep


      Is patient sleeping during reassessment?   No


     Presence of Pain


      Presence of Pain                           Yes


     Pain Scale Used


      Pain Scale Used                            Numeric


     Location


      Left, Right or Bilateral                   Right


      Pain Location Body Site                    Leg


     Description


      Intensity of Pain at present               7


      Pain Behavior                              Restlessness


      Alleviating Factors/Management             Medication


       Techniques                                


      Alleviating Factors                        Inactivity


IVP Administration


 Document     06/17/18 04:57  CDL  (Rec: 06/17/18 04:57  CD  [id number])


     Charges for Administration


      # of IVP Administrations                   1














- PA / NP / Resident Statement


MD/ has reviewed & agrees with the documentation as recorded.





<Megan Greco - Last Filed: 06/17/18 13:00>





Disposition/Present on Arrival





- Present on Arrival


Any Indicators Present on Arrival: No


History of DVT/PE: No


History of Uncontrolled Diabetes: No


Urinary Catheter: No


History of Decub. Ulcer: No


History Surgical Site Infection Following: Orthopedic Procedures





- Disposition


Have Diagnosis and Disposition been Completed?: Yes


Disposition Time: 19:09


Patient Plan: Admission





<Yobany Miller - Last Filed: 06/16/18 17:36>





<Megan Greco - Last Filed: 06/17/18 13:00>





- Disposition


Diagnosis: 


 Cellulitis of right foot, Schizoaffective disorder, Withdrawal symptoms, drug 

or narcotic





Disposition: HOSPITALIZED


Patient Problems: 


 Current Active Problems











Problem Status Onset


 


Cellulitis of right foot Acute  


 


Withdrawal symptoms, drug or narcotic Acute  


 


Schizoaffective disorder Chronic  











Condition: FAIR

## 2018-06-17 LAB
BASOPHILS # BLD AUTO: 0.02 K/MM3 (ref 0–2)
BASOPHILS NFR BLD: 0.2 % (ref 0–3)
BUN SERPL-MCNC: 9 MG/DL (ref 7–21)
CALCIUM SERPL-MCNC: 9 MG/DL (ref 8.4–10.5)
EOSINOPHIL # BLD: 0.2 10*3/UL (ref 0–0.7)
EOSINOPHIL NFR BLD: 1.9 % (ref 1.5–5)
ERYTHROCYTE [DISTWIDTH] IN BLOOD BY AUTOMATED COUNT: 13.1 % (ref 11.5–14.5)
GFR NON-AFRICAN AMERICAN: > 60
GRANULOCYTES # BLD: 6.95 10*3/UL (ref 1.4–6.5)
GRANULOCYTES NFR BLD: 70.4 % (ref 50–68)
HGB BLD-MCNC: 12.9 G/DL (ref 14–18)
LYMPHOCYTES # BLD: 2 10*3/UL (ref 1.2–3.4)
LYMPHOCYTES NFR BLD AUTO: 20 % (ref 22–35)
MCH RBC QN AUTO: 29.5 PG (ref 25–35)
MCHC RBC AUTO-ENTMCNC: 33.7 G/DL (ref 31–37)
MCV RBC AUTO: 87.4 FL (ref 80–105)
MONOCYTES # BLD AUTO: 0.7 10*3/UL (ref 0.1–0.6)
MONOCYTES NFR BLD: 7.5 % (ref 1–6)
PLATELET # BLD: 160 10^3/UL (ref 120–450)
PMV BLD AUTO: 11.1 FL (ref 7–11)
RBC # BLD AUTO: 4.38 10^6/UL (ref 3.5–6.1)
WBC # BLD AUTO: 9.9 10^3/UL (ref 4.5–11)

## 2018-06-17 RX ADMIN — DIVALPROEX SODIUM SCH MG: 500 TABLET, DELAYED RELEASE ORAL at 09:48

## 2018-06-17 RX ADMIN — MORPHINE SULFATE PRN MG: 2 INJECTION, SOLUTION INTRAMUSCULAR; INTRAVENOUS at 23:17

## 2018-06-17 RX ADMIN — PIPERACILLIN AND TAZOBACTAM SCH MLS/HR: 3; .375 INJECTION, POWDER, LYOPHILIZED, FOR SOLUTION INTRAVENOUS; PARENTERAL at 23:16

## 2018-06-17 RX ADMIN — QUETIAPINE SCH MG: 50 TABLET, EXTENDED RELEASE ORAL at 22:42

## 2018-06-17 RX ADMIN — MORPHINE SULFATE PRN MG: 2 INJECTION, SOLUTION INTRAMUSCULAR; INTRAVENOUS at 17:29

## 2018-06-17 RX ADMIN — CLOTRIMAZOLE SCH APPL: 1 CREAM TOPICAL at 09:50

## 2018-06-17 RX ADMIN — MORPHINE SULFATE PRN MG: 2 INJECTION, SOLUTION INTRAMUSCULAR; INTRAVENOUS at 08:29

## 2018-06-17 RX ADMIN — CLOTRIMAZOLE SCH APPL: 1 CREAM TOPICAL at 17:45

## 2018-06-17 RX ADMIN — DIVALPROEX SODIUM SCH MG: 500 TABLET, DELAYED RELEASE ORAL at 22:43

## 2018-06-17 RX ADMIN — POLYETHYLENE GLYCOL 3350 SCH GM: 17 POWDER, FOR SOLUTION ORAL at 09:45

## 2018-06-17 RX ADMIN — VANCOMYCIN HYDROCHLORIDE SCH MLS/HR: 1 INJECTION, POWDER, LYOPHILIZED, FOR SOLUTION INTRAVENOUS at 09:49

## 2018-06-17 RX ADMIN — PIPERACILLIN AND TAZOBACTAM SCH MLS/HR: 3; .375 INJECTION, POWDER, LYOPHILIZED, FOR SOLUTION INTRAVENOUS; PARENTERAL at 17:30

## 2018-06-17 RX ADMIN — QUETIAPINE SCH MG: 50 TABLET, EXTENDED RELEASE ORAL at 09:46

## 2018-06-17 NOTE — RAD
PROCEDURE:  Right Foot Radiographs.



HISTORY:

right foot poss cellulitis 5th digit  



COMPARISON:

None.



FINDINGS:



BONES:

Normal. No fracture. 



JOINTS:

Normal. 



SOFT TISSUES:

Soft tissue swelling 5th digit without evidence of foreign body, air 

within the soft tissues. 



OTHER FINDINGS:

None.



IMPRESSION:

Soft tissue swelling without acute articular or osseous abnormality. 

Specifically no evidence of osteomyelitis radiographically.

## 2018-06-17 NOTE — CARD
--------------- APPROVED REPORT --------------





EKG Measurement

Heart [id number]

[id number]

[id number]

[id number]

[id number]



<Conclusion>

Sinus tachycardia

Otherwise normal ECG
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
## 2018-06-18 LAB
BASOPHILS # BLD AUTO: 0.02 K/MM3 (ref 0–2)
BASOPHILS NFR BLD: 0.2 % (ref 0–3)
BUN SERPL-MCNC: 11 MG/DL (ref 7–21)
CALCIUM SERPL-MCNC: 9 MG/DL (ref 8.4–10.5)
EOSINOPHIL # BLD: 0.2 10*3/UL (ref 0–0.7)
EOSINOPHIL NFR BLD: 1.9 % (ref 1.5–5)
ERYTHROCYTE [DISTWIDTH] IN BLOOD BY AUTOMATED COUNT: 13.3 % (ref 11.5–14.5)
GFR NON-AFRICAN AMERICAN: > 60
GRANULOCYTES # BLD: 6.78 10*3/UL (ref 1.4–6.5)
GRANULOCYTES NFR BLD: 67.6 % (ref 50–68)
HGB BLD-MCNC: 13.5 G/DL (ref 14–18)
LYMPHOCYTES # BLD: 2.4 10*3/UL (ref 1.2–3.4)
LYMPHOCYTES NFR BLD AUTO: 24 % (ref 22–35)
MCH RBC QN AUTO: 29.9 PG (ref 25–35)
MCHC RBC AUTO-ENTMCNC: 34.2 G/DL (ref 31–37)
MCV RBC AUTO: 87.6 FL (ref 80–105)
MONOCYTES # BLD AUTO: 0.6 10*3/UL (ref 0.1–0.6)
MONOCYTES NFR BLD: 6.3 % (ref 1–6)
PLATELET # BLD: 157 10^3/UL (ref 120–450)
PMV BLD AUTO: 10.9 FL (ref 7–11)
RBC # BLD AUTO: 4.51 10^6/UL (ref 3.5–6.1)
WBC # BLD AUTO: 10 10^3/UL (ref 4.5–11)

## 2018-06-18 RX ADMIN — DIVALPROEX SODIUM SCH MG: 500 TABLET, DELAYED RELEASE ORAL at 21:59

## 2018-06-18 RX ADMIN — MORPHINE SULFATE PRN MG: 2 INJECTION, SOLUTION INTRAMUSCULAR; INTRAVENOUS at 05:23

## 2018-06-18 RX ADMIN — PANTOPRAZOLE SODIUM SCH MG: 40 TABLET, DELAYED RELEASE ORAL at 05:23

## 2018-06-18 RX ADMIN — MORPHINE SULFATE PRN MG: 2 INJECTION, SOLUTION INTRAMUSCULAR; INTRAVENOUS at 14:34

## 2018-06-18 RX ADMIN — QUETIAPINE SCH MG: 50 TABLET, EXTENDED RELEASE ORAL at 10:53

## 2018-06-18 RX ADMIN — POLYETHYLENE GLYCOL 3350 SCH GM: 17 POWDER, FOR SOLUTION ORAL at 10:55

## 2018-06-18 RX ADMIN — DIVALPROEX SODIUM SCH MG: 500 TABLET, DELAYED RELEASE ORAL at 10:54

## 2018-06-18 RX ADMIN — VANCOMYCIN HYDROCHLORIDE SCH MLS/HR: 1 INJECTION, POWDER, LYOPHILIZED, FOR SOLUTION INTRAVENOUS at 22:00

## 2018-06-18 RX ADMIN — VANCOMYCIN HYDROCHLORIDE SCH MLS/HR: 1 INJECTION, POWDER, LYOPHILIZED, FOR SOLUTION INTRAVENOUS at 10:49

## 2018-06-18 RX ADMIN — QUETIAPINE SCH MG: 50 TABLET, EXTENDED RELEASE ORAL at 21:58

## 2018-06-18 RX ADMIN — CLOTRIMAZOLE SCH APPL: 1 CREAM TOPICAL at 10:55

## 2018-06-18 RX ADMIN — MORPHINE SULFATE PRN MG: 2 INJECTION, SOLUTION INTRAMUSCULAR; INTRAVENOUS at 20:45

## 2018-06-18 RX ADMIN — CLOTRIMAZOLE SCH: 1 CREAM TOPICAL at 18:10

## 2018-06-18 NOTE — US
PROCEDURE:  Lower extremity VALORIE exam



HISTORY:

Peripheral vascular disease with pain and ulceration. Smoker. 



PHYSICIAN(S):  Teddy Atkinson MD.



FINDINGS:

The resting VALORIE's are normal: right, 1.33and left, 1.23.



The brachial systolic pressures are symmetric.



The high thigh pressures and waveforms are relatively normal.



The calf PVR waveforms augment normally. No significant gradients are 

noted across the thighs.



The ankle and metatarsal waveforms are relatively normal and 

symmetric. No significant pressure gradients are noted across the 

lower legs.



IMPRESSION:

1. Normal VALORIE and PVR examination at rest.

## 2018-06-18 NOTE — MRI
EXAM:

  MR Right Lower Extremity Without Intravenous Contrast, Foot



EXAM DATE/TIME:

  6/18/2018 7:47 AM



CLINICAL HISTORY:

  The patient age is 47 years old and is male; Signs and symptoms; Cellulitis; 

Foot and toes; Right; Additional info: R/O 4-5th toes osteomyolitis. Incomplete 

exam, claustrophobic patient, refused to continue Facility exam id and 

description: Mri footwoconr foot w/o contrast right



TECHNIQUE:

  Multiplanar magnetic resonance images of the right foot without intravenous 

contrast.



COMPARISON:

  DX - FOOT RIGHT 3 VIEWS ROUTINE 2018-06-16 17:43



FINDINGS:

 LIGAMENTS:

  Evaluation of the ligaments is suboptimal due the absence of coronal 

sequences.



 TENDONS:

  Flexor:  No visualized acute tear.

  Extensor:  No visualized acute tear.



  Muscles:  Mild muscle edema is visualized, suggestive of muscle strain, 

myositis, or denervation edema.  Muscle atrophy is visualized.

  Fluid:  No joint effusion.

  Bones/joints:  STIR hyperintense edema is identified within the fused mid and 

distal fifth phalanges, suspicious for osteomyelitis.  There is no dislocation 

involving the forefoot.

  Soft tissues:  There is soft tissue swelling of the dorsum of the foot and 

fifth digit, suggestive of cellulitis.  There is mild soft tissue swelling of 

the first and second digits, with edema within the distal phalanges, also 

suspicious for osteomyelitis. Minimal equivocal edema is seen within the distal 

third and fourth phalanges.

  Other findings:  The study demonstrates on the forefoot. This study is 

incomplete. Sagittal STIR, sagittal T1, and axial T2 sequences were available 

for review.



IMPRESSION:     

1.  There is soft tissue swelling of the dorsum of the foot and fifth digit, 

suggestive of cellulitis.

2.  Edema is identified within the fused mid and distal fifth phalanges, 

suspicious for osteomyelitis.

3.  There is mild soft tissue swelling of the first and second digits, with 

edema within the distal phalanges, also suspicious for osteomyelitis. Minimal 

equivocal edema is seen within the distal third and fourth phalanges. 

4.  Mild muscle edema is visualized, suggestive of muscle strain, myositis, or 

denervation edema.

5.  Additional findings described above.

6.  Clinical correlation of the above-mentioned findings and completion of the 

study are recommended.

## 2018-06-18 NOTE — CT
PROCEDURE:  CT ORBITS WITHOUT CONTRAST.



HISTORY:

R/O presence of metal fragments, former 



COMPARISON:

None available. 



TECHNIQUE:

Axial CT images of the orbits were obtained. Coronal and sagittal 

reformats were generated.



Radiation dose:



Total exam DLP = 745 mGy-cm.



This CT exam was performed using one or more of the following dose 

reduction techniques: Automated exposure control, adjustment of the 

mA and/or kV according to patient size, and/or use of iterative 

reconstruction technique.



FINDINGS:



RIGHT ORBIT:



RIGHT BONY ORBIT:

There is a chronic appearing orbital floor fracture on the right side 

with some herniation of orbital fat. There is no evidence of 

hemorrhage or fluid in the right maxillary sinus. There is no history 

of recent trauma.



RIGHT INTRAORBITAL STRUCTURES:

Globe: Normal.



Extraocular muscles: Normal.



Post septal space: Normal.



Optic Nerve: Normal.



Lacrimal Apparatus: Normal.



RIGHT PRESEPTAL SOFT TISSUES:

Normal.



LEFT ORBIT:



LEFT BONY ORBIT:

There are some orthopedic fixation devices and wires in the left 

maxilla and left orbit. There are no metallic fragments within the 

orbit itself. This should be safe for MRI scanning.



LEFT INTRAORBITAL STRUCTURES:

Globe: Normal.



Extraocular muscles: Normal.



Post septal space: Normal



Optic Nerve: Normal. .



Lacrimal Apparatus: Normal.



LEFT PRESEPTAL SOFT TISSUES:

Normal.



OTHER:

None.



IMPRESSION:

There are some orthopedic fixation devices and wires in the left 

maxilla and left orbit. There are no metallic fragments within the 

orbit itself. This should be safe for MRI scanning.

## 2018-06-18 NOTE — CP.PCM.CON
<Senia Bourne - Last Filed: 06/18/18 17:00>





History of Present Illness





- History of Present Illness


History of Present Illness: 





Podiatry Consult note: Dr. Albright





47 year old male patient with PMHx of bipolar disorder, schizoaffective disorder

, polysubstance abuse, and hx right leg compartment syndrome s/p fasciotomy was 

seen and evaluated for right 5th digit wound. Patient reports that he has a 

callus there and he tends to pick at it. Reports that he may have picked at it 

too much which may have led to this infection. Patient denies of any trauma or 

injury to the foot. Denies of having any prior treatment until coming to the 

hospital. Reports that this started about 3-4 days ago. Denies of having any 

recent F/N/V/C/SOB/CP/D. Reports that he feels little pain every now and then 

but does not bother him if no one touches it. Denies of any other pedal 

complains at this time. 





PMHx: bipolar disorder, schizoaffective disorder, polysubstance abuse, and hx 

right leg compartment syndrome


PSHx: Fasciotomy 


Allergies: Contrast dye


SHx: smokes 1 ppd for >20 years, reports of cocaine use approx a week ago, 

agrees to EtOH consumption





Review of Systems





- Constitutional


Constitutional: As Per HPI





Past Patient History





- Infectious Disease


Hx of Infectious Diseases: None





- Tetanus Immunizations


Tetanus Immunization: Unknown





- Past Medical History & Family History


Past Medical History?: Yes





- Past Social History


Smoking Status: Heavy Smoker > 10 Cigarettes Daily





- CARDIAC


Hx Cardiac Disorders: Yes


Hx Angina: Yes


Hx Peripheral Edema: Yes





- PULMONARY


Hx Respiratory Disorders: No





- NEUROLOGICAL


Hx Neurological Disorder: No





- HEENT


Hx HEENT Problems: No





- RENAL


Hx Chronic Kidney Disease: No





- ENDOCRINE/METABOLIC


Hx Endocrine Disorders: No





- HEMATOLOGICAL/ONCOLOGICAL


Hx Blood Disorders: No





- INTEGUMENTARY


Hx Dermatological Problems: Yes (right leg swelling/ cellulitis)





- MUSCULOSKELETAL/RHEUMATOLOGICAL


Hx Musculoskeletal Disorders: Yes


Hx Falls: Yes


Hx Fractures: Yes


Other/Comment: Brace to right leg, Hx. fasciectomy.





- GASTROINTESTINAL


Hx Gastrointestinal Disorders: No





- GENITOURINARY/GYNECOLOGICAL


Hx Genitourinary Disorders: No





- PSYCHIATRIC


Hx Psychophysiologic Disorder: Yes


Hx Anxiety: Yes


Hx Bipolar Disorder: Yes


Hx Depression: Yes


Hx Substance Use: Yes (extasis used 6/8/18)


Other/Comment: alcohol user, substance abuse user (quit 2wks ago)





- SURGICAL HISTORY


Hx Surgeries: Yes


Hx Orthopedic Surgery: Yes (Fasciotomy (R Leg))





- ANESTHESIA


Hx Anesthesia: Yes


Hx Anesthesia Reactions: No


Hx Malignant Hyperthermia: No





Meds


Allergies/Adverse Reactions: 


 Allergies











Allergy/AdvReac Type Severity Reaction Status Date / Time


 


ct dye Allergy  ITCHING Uncoded 06/04/18 10:22














- Medications


Medications: 


 Current Medications





Acetaminophen (Tylenol 325mg Tab)  650 mg PO Q6H PRN


   PRN Reason: Pain, moderate (4-7)


Benztropine Mesylate (Cogentin)  2 mg PO [facility]


   Last Admin: 06/18/18 11:18 Dose:  2 mg


Clotrimazole (Lotrimin 1%)  0 gm TOP BID [facility]


   Last Admin: 06/18/18 10:55 Dose:  1 appl


Divalproex Sodium (Depakote Dr(*Bid*))  500 mg PO [facility]


   Last Admin: 06/18/18 10:54 Dose:  500 mg


Docusate Sodium (Colace)  100 mg PO TID [facility]


   Last Admin: 06/18/18 14:33 Dose:  100 mg


Fluoxetine HCl (Prozac)  40 mg PO DAILY [facility]


   Last Admin: 06/18/18 10:52 Dose:  40 mg


Gabapentin (Neurontin)  600 mg PO QID [facility]


   PRN Reason: Protocol


   Last Admin: 06/18/18 14:33 Dose:  600 mg


Heparin Sodium (Porcine) (Heparin)  5,000 units SC Q12 [facility]


   PRN Reason: Protocol


   Last Admin: 06/18/18 10:53 Dose:  5,000 units


Vancomycin HCl (Vancomycin 1gm)  1 gm in 250 mls @ 167 mls/hr IVPB Q12 [facility]


   PRN Reason: Protocol


Ketorolac Tromethamine (Toradol)  15 mg IVP Q6 PRN


   PRN Reason: Pain, severe (8-10)


   Last Admin: 06/17/18 22:44 Dose:  15 mg


Meloxicam (Mobic)  7.5 mg PO DAILY [facility]


   Last Admin: 06/18/18 10:54 Dose:  7.5 mg


Morphine Sulfate (Morphine)  2 mg IVP Q6 PRN


   PRN Reason: Pain, moderate (4-7)


   Last Admin: 06/18/18 14:34 Dose:  2 mg


Pantoprazole Sodium (Protonix Ec Tab)  40 mg PO 0600 [facility]


   Last Admin: 06/18/18 05:23 Dose:  40 mg


Polyethylene Glycol (Miralax)  17 gm PO DAILY [facility]


   Last Admin: 06/18/18 10:55 Dose:  17 gm


Quetiapine Fumarate (Seroquel Xr)  400 mg PO [facility]


   PRN Reason: Protocol


   Last Admin: 06/18/18 10:53 Dose:  400 mg


Tamsulosin HCl (Flomax)  0.4 mg PO DAILY [facility]


   Last Admin: 06/18/18 10:54 Dose:  0.4 mg


Zolpidem Tartrate (Ambien)  5 mg PO HS PRN; Protocol


   PRN Reason: Insomnia


   Last Admin: 06/17/18 22:44 Dose:  5 mg











Physical Exam





- Constitutional


Appears: Well, Non-toxic, No Acute Distress





- Extremities Exam


Additional comments: 





Bilateral LE focused exam:





VASC: DP/PT pulses are palpable 2.4, Cap refill time: < 3 sec to all digits, 

Temp gradient: warm to cool from proximal to distal on the left and warm to 

warm on the right, non-pitting edema noted on the dorsum of the foot on the 

right





DERM: superficial epidermal layer lysis with dermal layer exposed on the dorsal 

aspect of the right 5th digit, andrea wound maceration with 4th interspace 

maceration, erythema extending from the 5th digit proximally towards the ankle 

joint, mild mal odor, no active drainage, no purulence, no probe to bone





NEURO: Protective sensation grossly intact





ORTHO: pain on palpation of the wound on the right 5th digit, Prett test 

negative





- Neurological Exam


Neurological exam: Alert, Oriented x3





- Psychiatric Exam


Psychiatric exam: Normal Affect, Normal Mood





Results





- Vital Signs


Recent Vital Signs: 


 Last Vital Signs











Temp  98.1 F   06/18/18 12:00


 


Pulse  78   06/18/18 12:00


 


Resp  20   06/18/18 12:00


 


BP  134/93 H  06/18/18 12:00


 


Pulse Ox  98   06/18/18 06:00














- Labs


Result Diagrams: 


 06/18/18 05:30





 06/18/18 05:30


Labs: 


 Laboratory Results - last 24 hr











  06/18/18 06/18/18





  05:30 05:30


 


WBC  10.0 


 


RBC  4.51 


 


Hgb  13.5 L 


 


Hct  39.5 L 


 


MCV  87.6 


 


MCH  29.9 


 


MCHC  34.2 


 


RDW  13.3 


 


Plt Count  157 


 


MPV  10.9 


 


Gran %  67.6 


 


Lymph % (Auto)  24.0 


 


Mono % (Auto)  6.3 H 


 


Eos % (Auto)  1.9 


 


Baso % (Auto)  0.2 


 


Gran #  6.78 H 


 


Lymph # (Auto)  2.4 


 


Mono # (Auto)  0.6 


 


Eos # (Auto)  0.2 


 


Baso # (Auto)  0.02 


 


Sodium   143


 


Potassium   4.3


 


Chloride   106


 


Carbon Dioxide   25


 


Anion Gap   17


 


BUN   11


 


Creatinine   0.8


 


Est GFR ( Amer)   > 60


 


Est GFR (Non-Af Amer)   > 60


 


Random Glucose   81


 


Calcium   9.0














Assessment & Plan





- Assessment and Plan (Free Text)


Assessment: 





47 year old male with extensive PMHx was evaluated for 1). right 5th digit 

ulcer 2). cellulitis


Plan: 





Patient seen and evaluated


Plan discussed with attending Dr. Albright


Labs, vials and charts reviewed 


Afebrile, WBC @ 10.0


Wound cleaned with saline and dressing applied using betadine, DSD


Bactroban ordered


MRI of the right foot - pending


Wound cultures: Staph aureus


Continue IV abx as per ID - Vancomycin 


Local wound care


Will continue to monitor patient while in-house 


Thank you for the podiatry consult and allowing to take part in patient care








- Date & Time


Date: 06/18/18


Time: 16:50





<Linda Albright - Last Filed: 06/20/18 07:54>





Meds





- Medications


Medications: 


 Current Medications





Acetaminophen (Tylenol 325mg Tab)  650 mg PO Q6H PRN


   PRN Reason: Pain, moderate (4-7)


Benztropine Mesylate (Cogentin)  1 mg PO [facility]


   Last Admin: 06/19/18 21:13 Dose:  1 mg


Clotrimazole (Lotrimin 1%)  0 gm TOP BID [facility]


   Last Admin: 06/19/18 18:25 Dose:  Not Given


Divalproex Sodium (Depakote Dr(*Bid*))  500 mg PO [facility]


   Last Admin: 06/19/18 21:14 Dose:  500 mg


Docusate Sodium (Colace)  100 mg PO TID [facility]


   Last Admin: 06/19/18 18:45 Dose:  100 mg


Fluoxetine HCl (Prozac)  40 mg PO DAILY [facility]


   Last Admin: 06/19/18 10:05 Dose:  40 mg


Gabapentin (Neurontin)  600 mg PO QID [facility]


   PRN Reason: Protocol


   Last Admin: 06/19/18 21:14 Dose:  600 mg


Heparin Sodium (Porcine) (Heparin)  5,000 units SC Q12 [facility]


   PRN Reason: Protocol


   Last Admin: 06/19/18 21:15 Dose:  5,000 units


Vancomycin HCl (Vancomycin 1gm)  1 gm in 250 mls @ 167 mls/hr IVPB Q12 [facility]


   PRN Reason: Protocol


   Last Admin: 06/19/18 21:17 Dose:  167 mls/hr


Ketorolac Tromethamine (Toradol)  15 mg IVP Q6H PRN


   PRN Reason: Pain, moderate (4-7)


   Last Admin: 06/20/18 00:35 Dose:  15 mg


Meloxicam (Mobic)  15 mg PO DAILY [facility]


   Last Admin: 06/19/18 10:06 Dose:  15 mg


Morphine Sulfate (Morphine)  2 mg IVP Q6H PRN


   PRN Reason: Pain, severe (8-10)


   Last Admin: 06/20/18 05:39 Dose:  2 mg


Pantoprazole Sodium (Protonix Ec Tab)  40 mg PO 0600 [facility]


   Last Admin: 06/20/18 05:39 Dose:  40 mg


Polyethylene Glycol (Miralax)  17 gm PO DAILY [facility]


   Last Admin: 06/19/18 10:06 Dose:  17 gm


Quetiapine Fumarate (Seroquel Xr)  800 mg PO HS HOME


   PRN Reason: Protocol


   Last Admin: 06/19/18 21:14 Dose:  800 mg


Tamsulosin HCl (Flomax)  0.4 mg PO DAILY [facility]


   Last Admin: 06/19/18 10:05 Dose:  0.4 mg


Zolpidem Tartrate (Ambien)  5 mg PO HS PRN; Protocol


   PRN Reason: Insomnia


   Last Admin: 06/19/18 22:45 Dose:  5 mg











Results





- Vital Signs


Recent Vital Signs: 


 Last Vital Signs











Temp  97.6 F   06/19/18 18:00


 


Pulse  90   06/19/18 18:00


 


Resp  19   06/19/18 18:00


 


BP  120/73   06/19/18 18:00


 


Pulse Ox  98   06/19/18 18:00














- Labs


Result Diagrams: 


 06/20/18 06:00





 06/20/18 06:00


Labs: 


 Laboratory Results - last 24 hr











  06/20/18 06/20/18





  06:00 06:00


 


WBC  7.7 


 


RBC  4.40 


 


Hgb  13.1 L 


 


Hct  38.2 L 


 


MCV  86.8 


 


MCH  29.8 


 


MCHC  34.3 


 


RDW  12.9 


 


Plt Count  161 


 


MPV  11.3 H 


 


Gran %  57.8 


 


Lymph % (Auto)  33.2 


 


Mono % (Auto)  5.6 


 


Eos % (Auto)  3.1 


 


Baso % (Auto)  0.3 


 


Gran #  4.45 


 


Lymph # (Auto)  2.6 


 


Mono # (Auto)  0.4 


 


Eos # (Auto)  0.2 


 


Baso # (Auto)  0.02 


 


Sodium   143


 


Potassium   4.0


 


Chloride   105


 


Carbon Dioxide   27


 


Anion Gap   15


 


BUN   13


 


Creatinine   0.7 L


 


Est GFR ( Amer)   > 60


 


Est GFR (Non-Af Amer)   > 60


 


Random Glucose   99


 


Calcium   8.8














Attending/Attestation





- Attestation


I have personally seen and examined this patient.: No


I have fully participated in the care of the patient.: No


I have reviewed all pertinent clinical information: No

## 2018-06-18 NOTE — CP.PCM.PN
<Lou Diamond - Last Filed: 06/18/18 14:37>





Subjective





- Date & Time of Evaluation


Date of Evaluation: 06/18/18


Time of Evaluation: 11:18





- Subjective


Subjective: 





Internal Medicine Progress Note - Hospitalist Service





Patient seen and examined at bedside. Per nursing no acute events overnight. 

Patient is requesting Dilaudid. States that he is having 9/10 foot pain. Denies 

fevers/chills, N/V, headaches, dizziness, cp, palpitations, sob, abdominal pain

, urinary symptoms. 





Objective





- Vital Signs/Intake and Output


Vital Signs (last 24 hours): 


 











Temp Pulse Resp BP Pulse Ox


 


 98.4 F   61   20   119/78   98 


 


 06/18/18 06:00  06/18/18 06:00  06/18/18 06:00  06/18/18 06:00  06/18/18 06:00











- Medications


Medications: 


 Current Medications





Acetaminophen (Tylenol 325mg Tab)  650 mg PO Q6H PRN


   PRN Reason: Pain, moderate (4-7)


Benztropine Mesylate (Cogentin)  2 mg PO [facility]


   Last Admin: 06/17/18 22:43 Dose:  2 mg


Clotrimazole (Lotrimin 1%)  0 gm TOP BID [facility]


   Last Admin: 06/17/18 17:45 Dose:  1 appl


Divalproex Sodium (Depakote Dr(*Bid*))  500 mg PO [facility]


   Last Admin: 06/18/18 10:54 Dose:  500 mg


Docusate Sodium (Colace)  100 mg PO TID [facility]


   Last Admin: 06/18/18 10:54 Dose:  100 mg


Fluoxetine HCl (Prozac)  40 mg PO DAILY [facility]


   Last Admin: 06/18/18 10:52 Dose:  40 mg


Gabapentin (Neurontin)  600 mg PO QID [facility]


   PRN Reason: Protocol


   Last Admin: 06/18/18 10:54 Dose:  600 mg


Heparin Sodium (Porcine) (Heparin)  5,000 units SC Q12 [facility]


   PRN Reason: Protocol


   Last Admin: 06/18/18 10:53 Dose:  5,000 units


Vancomycin HCl (Vancomycin 1gm)  1 gm in 250 mls @ 167 mls/hr IVPB [facility]ILY [facility]


   PRN Reason: Protocol


   Last Admin: 06/18/18 10:49 Dose:  167 mls/hr


Ketorolac Tromethamine (Toradol)  15 mg IVP Q6 PRN


   PRN Reason: Pain, severe (8-10)


   Last Admin: 06/17/18 22:44 Dose:  15 mg


Meloxicam (Mobic)  7.5 mg PO DAILY [facility]


   Last Admin: 06/18/18 10:54 Dose:  7.5 mg


Morphine Sulfate (Morphine)  2 mg IVP Q6 PRN


   PRN Reason: Pain, moderate (4-7)


   Last Admin: 06/18/18 05:23 Dose:  2 mg


Pantoprazole Sodium (Protonix Ec Tab)  40 mg PO 0600 [facility]


   Last Admin: 06/18/18 05:23 Dose:  40 mg


Polyethylene Glycol (Miralax)  17 gm PO DAILY [facility]


   Last Admin: 06/18/18 10:55 Dose:  17 gm


Quetiapine Fumarate (Seroquel Xr)  400 mg PO [facility]


   PRN Reason: Protocol


   Last Admin: 06/18/18 10:53 Dose:  400 mg


Tamsulosin HCl (Flomax)  0.4 mg PO DAILY [facility]


   Last Admin: 06/18/18 10:54 Dose:  0.4 mg


Zolpidem Tartrate (Ambien)  5 mg PO HS PRN; Protocol


   PRN Reason: Insomnia


   Last Admin: 06/17/18 22:44 Dose:  5 mg











- Labs


Labs: 


 





 06/18/18 05:30 





 06/18/18 05:30 





 











APTT  30.5 Seconds (25.1-36.5)   06/17/18  07:00    














- Constitutional


Appears: No Acute Distress





- Head Exam


Head Exam: ATRAUMATIC, NORMAL INSPECTION, NORMOCEPHALIC





- Eye Exam


Eye Exam: EOMI, Normal appearance


Pupil Exam: NORMAL ACCOMODATION





- ENT Exam


ENT Exam: Mucous Membranes Moist





- Neck Exam


Neck Exam: Full ROM





- Respiratory Exam


Respiratory Exam: Clear to Ausculation Bilateral, NORMAL BREATHING PATTERN.  

absent: Rales, Rhonchi, Wheezes





- Cardiovascular Exam


Cardiovascular Exam: REGULAR RHYTHM, +S1, +S2





- GI/Abdominal Exam


GI & Abdominal Exam: Soft, Normal Bowel Sounds.  absent: Guarding, Rigid, 

Tenderness





- Extremities Exam


Additional comments: 





Right lower extremity: No palpable pulses, Erythema and swelling extending from 

5th digit to dorsal aspect of right foot, area marked with pen, small area of 

cellulitic skin changes on lower leg





- Back Exam


Back Exam: NORMAL INSPECTION





- Neurological Exam


Neurological Exam: Alert, Awake, Oriented x3





- Psychiatric Exam


Psychiatric exam: Normal Affect, Normal Mood





- Skin


Skin Exam: Dry, Normal Color, Warm





Assessment and Plan





- Assessment and Plan (Free Text)


Assessment: 





A/P: Patient is a 48 yo M with PMH bipolar disorder, schizoaffective disorder, 

polysubstance abuse, and right leg compartment syndrome s/p fasciotomy, 

presents for right leg cellulitis 2/2 trauma, also found to have tachycardia:





Right Lower Extremity Cellulitis


-Stable, afebrile, no leukocytosis


-CRP elevated, ESR elevated


-X ray foot: soft tissue swelling without acute articular or osseous 

abnormality. No osteomyelitis


-Right foot MRI ordered to rule out osteomyelitis


-CT orbit pending; patient concerned he might have metal fragments in his eye 

from previous occupation


-Antibiotics: Vancomycin 1gm Q12H


-F/U wound culture


-F/U lower extremity US to evaluate for PAD


-Pain control: Morphine 2 mg IV q6 prn, Toradol 15mg Q6H prn


-ID on consult, help appreciated


-Podiatry on consult, help appreciated


-Physical Therapy ordered


-Patient is requesting dilaudid for pain control, exhibiting pain seeking 

behavior; Patient with history of UDS +cocaine, benzo, opioids; instructed 

patient we will continue to Morphine at this time





Tachycardia


-Currently asymptomatic


-HR 60s-80s now


-Echocardiogram ordered


-Will discontinue telemetry





Hx of BPH: 


-Continue flomax





Hx of Mood disorder, schizoaffective disorder 


-Continue Seroquel and fluoxetine


-Continue Depakote and Cogentin


-Depakote level ordered


-Psych on consult, help appreciated





Hx of Neuropathy: 


-Continue with Neurontin 600mg QID





Insomnia


-Ambien PRN





DVT/GI prophylaxis: 


-Heparin 5000 Q12 [facility] 


-Protonix 40mg PO daily





Plan discussed with Dr Precious Diamond DO PGY-1





<Cathy Lang - Last Filed: 06/18/18 14:55>





Objective





- Vital Signs/Intake and Output


Vital Signs (last 24 hours): 


 











Temp Pulse Resp BP Pulse Ox


 


 98.1 F   78   20   134/93 H  98 


 


 06/18/18 12:00  06/18/18 12:00  06/18/18 12:00  06/18/18 12:00  06/18/18 06:00











- Medications


Medications: 


 Current Medications





Acetaminophen (Tylenol 325mg Tab)  650 mg PO Q6H PRN


   PRN Reason: Pain, moderate (4-7)


Benztropine Mesylate (Cogentin)  2 mg PO [facility]


   Last Admin: 06/18/18 11:18 Dose:  2 mg


Clotrimazole (Lotrimin 1%)  0 gm TOP BID [facility]


   Last Admin: 06/18/18 10:55 Dose:  1 appl


Divalproex Sodium (Depakote Dr(*Bid*))  500 [facility]


   Last Admin: 06/18/18 10:54 Dose:  500 mg


Docusate Sodium (Colace)  100 mg PO TID [facility]


   Last Admin: 06/18/18 14:33 Dose:  100 mg


Fluoxetine HCl (Prozac)  40 mg PO DAILY [facility]


   Last Admin: 06/18/18 10:52 Dose:  40 mg


Gabapentin (Neurontin)  600 mg PO QID [facility]


   PRN Reason: Protocol


   Last Admin: 06/18/18 14:33 Dose:  600 mg


Heparin Sodium (Porcine) (Heparin)  5,000 units SC Q12 [facility]


   PRN Reason: Protocol


   Last Admin: 06/18/18 10:53 Dose:  5,000 units


Vancomycin HCl (Vancomycin 1gm)  1 gm in 250 mls @ 167 mls/hr IVPB Q12 [facility]


   PRN Reason: Protocol


Ketorolac Tromethamine (Toradol)  15 mg IVP Q6 PRN


   PRN Reason: Pain, severe (8-10)


   Last Admin: 06/17/18 22:44 Dose:  15 mg


Meloxicam (Mobic)  7.5 mg PO DAILY [facility]


   Last Admin: 06/18/18 10:54 Dose:  7.5 mg


Morphine Sulfate (Morphine)  2 mg IVP Q6 PRN


   PRN Reason: Pain, moderate (4-7)


   Last Admin: 06/18/18 14:34 Dose:  2 mg


Pantoprazole Sodium (Protonix Ec Tab)  40 mg PO 0600 [facility]


   Last Admin: 06/18/18 05:23 Dose:  40 mg


Polyethylene Glycol (Miralax)  17 gm PO DAILY [facility]


   Last Admin: 06/18/18 10:55 Dose:  17 gm


Quetiapine Fumarate (Seroquel Xr)  400 mg PO [facility]


   PRN Reason: Protocol


   Last Admin: 06/18/18 10:53 Dose:  400 mg


Tamsulosin HCl (Flomax)  0.4 mg PO DAILY [facility]


   Last Admin: 06/18/18 10:54 Dose:  0.4 mg


Zolpidem Tartrate (Ambien)  5 mg PO HS PRN; Protocol


   PRN Reason: Insomnia


   Last Admin: 06/17/18 22:44 Dose:  5 mg











- Labs


Labs: 


 





 06/18/18 05:30 





 06/18/18 05:30 





 











APTT  30.5 Seconds (25.1-36.5)   06/17/18  07:00    














Attending/Attestation





- Attestation


I have personally seen and examined this patient.: Yes


I have fully participated in the care of the patient.: Yes


I have reviewed all pertinent clinical information, including history, physical 

exam and plan: Yes


Notes (Text): 





06/18/18 14:46


47 year old male with past medical history of bipolar, schizoaffective disorder

, polysubstance abuse, and right leg compartment syndrome s/p fasciotomy 

presented with right leg cellulitis.  Continue with iv antibiotics as per ID.  

MRI is pending to rule out osteomyelitis.  Lower extremity ultrasound read is 

pending as well.  He is asking of dilaudid but is already on morphine prn, 

toradol prn and gabapentin for pain.  He was counselled on risks of continued 

substance abuse.





Cathy Lang MD


Hospitalist.

## 2018-06-18 NOTE — CP.PCM.PN
Subjective





- Date & Time of Evaluation


Date of Evaluation: 06/18/18


Time of Evaluation: 13:25





- Subjective


Subjective: 





Still having right foot pain, no fevers.





Objective





- Vital Signs/Intake and Output


Vital Signs (last 24 hours): 


 











Temp Pulse Resp BP Pulse Ox


 


 98.4 F   61   20   119/78   98 


 


 06/18/18 06:00  06/18/18 06:00  06/18/18 06:00  06/18/18 06:00  06/18/18 06:00











- Medications


Medications: 


 Current Medications





Acetaminophen (Tylenol 325mg Tab)  650 mg PO Q6H PRN


   PRN Reason: Pain, moderate (4-7)


Benztropine Mesylate (Cogentin)  2 mg PO [facility]


   Last Admin: 06/17/18 22:43 Dose:  2 mg


Clotrimazole (Lotrimin 1%)  0 gm TOP BID [facility]


   Last Admin: 06/17/18 17:45 Dose:  1 appl


Divalproex Sodium (Depakote Dr(*Bid*))  500 mg PO [facility]


   Last Admin: 06/17/18 22:43 Dose:  500 mg


Docusate Sodium (Colace)  100 mg PO TID [facility]


   Last Admin: 06/17/18 17:28 Dose:  100 mg


Fluoxetine HCl (Prozac)  40 mg PO DAILY [facility]


   Last Admin: 06/17/18 09:45 Dose:  40 mg


Gabapentin (Neurontin)  600 mg PO QID [facility]


   PRN Reason: Protocol


   Last Admin: 06/17/18 22:43 Dose:  600 mg


Heparin Sodium (Porcine) (Heparin)  5,000 units SC Q12 [facility]


   PRN Reason: Protocol


   Last Admin: 06/17/18 22:46 Dose:  5,000 units


Vancomycin HCl (Vancomycin 1gm)  1 gm in 250 mls @ 167 mls/hr IVPB DAILY [facility]


   PRN Reason: Protocol


   Last Admin: 06/17/18 09:49 Dose:  167 mls/hr


Ketorolac Tromethamine (Toradol)  15 mg IVP Q6 PRN


   PRN Reason: Pain, severe (8-10)


   Last Admin: 06/17/18 22:44 Dose:  15 mg


Meloxicam (Mobic)  7.5 mg PO DAILY [facility]


   Last Admin: 06/17/18 09:47 Dose:  7.5 mg


Morphine Sulfate (Morphine)  2 mg IVP Q6 PRN


   PRN Reason: Pain, moderate (4-7)


   Last Admin: 06/18/18 05:23 Dose:  2 mg


Pantoprazole Sodium (Protonix Ec Tab)  40 mg PO 0600 [facility]


   Last Admin: 06/18/18 05:23 Dose:  40 mg


Polyethylene Glycol (Miralax)  17 gm PO DAILY [facility]


   Last Admin: 06/17/18 09:45 Dose:  17 gm


Quetiapine Fumarate (Seroquel Xr)  400 mg PO [facility] HOME


   PRN Reason: Protocol


   Last Admin: 06/17/18 22:42 Dose:  400 mg


Tamsulosin HCl (Flomax)  0.4 mg PO DAILY HOME


   Last Admin: 06/17/18 09:48 Dose:  0.4 mg


Zolpidem Tartrate (Ambien)  5 mg PO HS PRN; Protocol


   PRN Reason: Insomnia


   Last Admin: 06/17/18 22:44 Dose:  5 mg











- Labs


Labs: 


 





 06/18/18 05:30 





 06/18/18 05:30 





 











APTT  30.5 Seconds (25.1-36.5)   06/17/18  07:00    














- Constitutional


Appears: Chronically Ill





- Extremities Exam


Additional comments: 





right foot with dressings in place





Assessment and Plan





- Assessment and Plan (Free Text)


Plan: 


Assessment


Right foot cellulitis with cyanosis of the right 5th toe, R/O peripheral 

arterial disease, R/O osteomyelitis


schizoaffective disorder


bipolar disorder


anxiety and depression


history of right leg fasciotomy





Plan


Continue Vancomycin pending final culture results, MRI of the right foot and VALORIE

's


follow up Podiatry evaluation


will monitor clinically

## 2018-06-19 LAB
BASOPHILS # BLD AUTO: 0.02 K/MM3 (ref 0–2)
BASOPHILS NFR BLD: 0.3 % (ref 0–3)
BUN SERPL-MCNC: 12 MG/DL (ref 7–21)
CALCIUM SERPL-MCNC: 8.7 MG/DL (ref 8.4–10.5)
EOSINOPHIL # BLD: 0.2 10*3/UL (ref 0–0.7)
EOSINOPHIL NFR BLD: 2.7 % (ref 1.5–5)
ERYTHROCYTE [DISTWIDTH] IN BLOOD BY AUTOMATED COUNT: 13 % (ref 11.5–14.5)
GFR NON-AFRICAN AMERICAN: > 60
GRANULOCYTES # BLD: 4.52 10*3/UL (ref 1.4–6.5)
GRANULOCYTES NFR BLD: 57 % (ref 50–68)
HGB BLD-MCNC: 13.1 G/DL (ref 14–18)
LYMPHOCYTES # BLD: 2.7 10*3/UL (ref 1.2–3.4)
LYMPHOCYTES NFR BLD AUTO: 33.8 % (ref 22–35)
MCH RBC QN AUTO: 29.8 PG (ref 25–35)
MCHC RBC AUTO-ENTMCNC: 33.9 G/DL (ref 31–37)
MCV RBC AUTO: 87.7 FL (ref 80–105)
MONOCYTES # BLD AUTO: 0.5 10*3/UL (ref 0.1–0.6)
MONOCYTES NFR BLD: 6.2 % (ref 1–6)
PLATELET # BLD: 157 10^3/UL (ref 120–450)
PMV BLD AUTO: 10.7 FL (ref 7–11)
RBC # BLD AUTO: 4.4 10^6/UL (ref 3.5–6.1)
WBC # BLD AUTO: 7.9 10^3/UL (ref 4.5–11)

## 2018-06-19 RX ADMIN — DIVALPROEX SODIUM SCH MG: 500 TABLET, DELAYED RELEASE ORAL at 10:04

## 2018-06-19 RX ADMIN — CLOTRIMAZOLE SCH APPL: 1 CREAM TOPICAL at 14:50

## 2018-06-19 RX ADMIN — POLYETHYLENE GLYCOL 3350 SCH GM: 17 POWDER, FOR SOLUTION ORAL at 10:06

## 2018-06-19 RX ADMIN — QUETIAPINE SCH MG: 50 TABLET, EXTENDED RELEASE ORAL at 21:14

## 2018-06-19 RX ADMIN — MORPHINE SULFATE PRN MG: 2 INJECTION, SOLUTION INTRAMUSCULAR; INTRAVENOUS at 06:14

## 2018-06-19 RX ADMIN — MORPHINE SULFATE PRN MG: 2 INJECTION, SOLUTION INTRAMUSCULAR; INTRAVENOUS at 15:30

## 2018-06-19 RX ADMIN — PANTOPRAZOLE SODIUM SCH MG: 40 TABLET, DELAYED RELEASE ORAL at 06:20

## 2018-06-19 RX ADMIN — DIVALPROEX SODIUM SCH MG: 500 TABLET, DELAYED RELEASE ORAL at 21:14

## 2018-06-19 RX ADMIN — VANCOMYCIN HYDROCHLORIDE SCH MLS/HR: 1 INJECTION, POWDER, LYOPHILIZED, FOR SOLUTION INTRAVENOUS at 21:17

## 2018-06-19 RX ADMIN — CLOTRIMAZOLE SCH: 1 CREAM TOPICAL at 18:25

## 2018-06-19 RX ADMIN — MORPHINE SULFATE PRN MG: 2 INJECTION, SOLUTION INTRAMUSCULAR; INTRAVENOUS at 21:18

## 2018-06-19 RX ADMIN — VANCOMYCIN HYDROCHLORIDE SCH MLS/HR: 1 INJECTION, POWDER, LYOPHILIZED, FOR SOLUTION INTRAVENOUS at 11:53

## 2018-06-19 NOTE — CARD
--------------- APPROVED REPORT --------------





EXAM: Two-dimensional and M-mode echocardiogram with Doppler and 

color Doppler.



INDICATION

LVFX,TACHYCARDIA



2D DIMENSIONS 

Left Atrium (2D)4.1   (1.6-4.0cm)IVSd1.2   (0.7-1.1cm)

LVDd4.9   (3.9-5.9cm)PWd1.3   (0.7-1.1cm)

LVDs3.6   (2.5-4.0cm)FS (%) 26.1   %

LVEF (%)45.0   (>50%)



M-Mode DIMENSIONS 

Aortic Root3.60   (2.2-3.7cm)Aortic Cusp Exc.2.00   (1.5-2.0cm)



Aortic Valve

AoV Peak Abefylvo789.0cm/Le Peak GR.7mmHg



Mitral Valve

MV E Whfftcgo44.7cm/sMV A Sgxgmsod50.3cm/sE/A ratio1.1



TDI

Lateral E' Peak V12.60cm/sMedial E' Peak V9.65cm/sE/Lateral E'5.5

E/Medial E'7.2



Pulmonary Valve

PV Peak Qwkefbtf15.5cm/sPV Peak Grad.3mmHg



Tricuspid Valve

TR Peak Miwhdiez164sh/sRAP UFAUCFBC54chBsHC Peak Gr.9mmHg

[id number]



 LEFT VENTRICLE 

The left ventricle is normal size. There is mild concentric left 

ventricular hypertrophy. Left ventricle systolic function is low 

normal.EF-50% There is normal LV segmental wall motion. Transmitral 

Doppler flow pattern is Grade II-pseudonormal filling dynamics. No 

left ventricle thrombus noted on this study. There is no ventricular 

septal defect visualized. There is no left ventricular aneurysm. 

There is no mass noted in the left ventricle.



 RIGHT VENTRICLE 

The right ventricle is normal size. There is normal right ventricular 

wall thickness. The right ventricular systolic function is normal.



 ATRIA 

The left atrium is mildly dilated. The right atrium size is normal. 

The interatrial septum is intact with no evidence for an atrial 

septal defect.



 AORTIC VALVE 

The aortic valve is thickened but opens well. No aortic regurgitation 

is present. There is no aortic valvular stenosis. There is no aortic 

valvular vegetation.



 MITRAL VALVE 

The mitral valve is thickened but opens well. Mitral regurgitation is 

trace. There is no mitral valve stenosis. There is no evidence of 

mitral valve prolapse.



 TRICUSPID VALVE 

The tricuspid valve leaflets are thickened , but open well. There is 

trace tricuspid regurgitation.RVSP-19 mmof Hg. There is no tricuspid 

valve stenosis. There is no tricuspid valve prolapse or vegetation.



 PULMONIC VALVE 

The pulmonary valve is normal in structure. There is no pulmonic 

valvular regurgitation. There is no pulmonic valvular stenosis.



 GREAT VESSELS 

The aortic root is normal in size. The ascending aorta is normal in 

size. The pulmonary artery is normal. The IVC is normal in size and 

collapses >50% with inspiration.



 PERICARDIAL EFFUSION 

There is no pleural effusion. There is no pericardial effusion.



<Conclusion>

The left ventricle is normal size.

There is mild concentric left ventricular hypertrophy.

Left ventricle systolic function is low normal.EF-50%

Mitral regurgitation is trace.

There is trace tricuspid regurgitation.RVSP-19 mmof Hg.

The IVC is normal in size and collapses >50% with inspiration.

There is no pericardial effusion.

No Vegetation or thrombus noted.

## 2018-06-19 NOTE — CP.PCM.PN
<Lou Diamond - Last Filed: 06/19/18 12:49>





Subjective





- Date & Time of Evaluation


Date of Evaluation: 06/19/18


Time of Evaluation: 11:50





- Subjective


Subjective: 





Internal Medicine Progress Note - Hospitalist Service





Patient seen and examined at bedside. Per nursing no acute events overnight. 

Patient still requesting dilaudid, states that dilaudid works for him and 

morphine only gives him relief for 30 minutes. Denies headaches, dizziness, cp, 

palpitations, sob, abdominal pain, urinary symptoms. 





Objective





- Vital Signs/Intake and Output


Vital Signs (last 24 hours): 


 











Temp Pulse Resp BP Pulse Ox


 


 98.5 F   91 H  20   138/84   98 


 


 06/19/18 06:00  06/19/18 06:00  06/19/18 06:00  06/19/18 06:00  06/19/18 06:00











- Medications


Medications: 


 Current Medications





Acetaminophen (Tylenol 325mg Tab)  650 mg PO Q6H PRN


   PRN Reason: Pain, moderate (4-7)


Benztropine Mesylate (Cogentin)  1 mg PO [facility]HS [facility]


   Last Admin: 06/19/18 10:06 Dose:  1 mg


Clotrimazole (Lotrimin 1%)  0 gm TOP BID [facility]


   Last Admin: 06/18/18 18:10 Dose:  Not Given


Divalproex Sodium (Depakote Dr(*Bid*))  500 mg PO [facility]


   Last Admin: 06/19/18 10:04 Dose:  500 mg


Docusate Sodium (Colace)  100 mg PO TID [facility]


   Last Admin: 06/19/18 10:06 Dose:  100 mg


Fluoxetine HCl (Prozac)  40 mg PO DAILY [facility]


   Last Admin: 06/19/18 10:05 Dose:  40 mg


Gabapentin (Neurontin)  600 mg PO QID [facility]


   PRN Reason: Protocol


   Last Admin: 06/19/18 10:05 Dose:  600 mg


Heparin Sodium (Porcine) (Heparin)  5,000 units SC Q12 [facility]


   PRN Reason: Protocol


   Last Admin: 06/19/18 10:05 Dose:  5,000 units


Vancomycin HCl (Vancomycin 1gm)  1 gm in 250 mls @ 167 mls/hr IVPB Q12 [facility]


   PRN Reason: Protocol


   Last Admin: 06/18/18 22:00 Dose:  167 mls/hr


Meloxicam (Mobic)  15 mg PO DAILY [facility]


   Last Admin: 06/19/18 10:06 Dose:  15 mg


Oxycodone HCl (Oxycontin Extended Release Tab)  10 mg PO Q12 PRN


   PRN Reason: Pain, moderate (4-7)


   Stop: 06/22/18 12:01


Oxycodone HCl (Oxycodone Immediate Release Tab)  15 mg PO Q4H PRN


   PRN Reason: Pain, severe (8-10)


Pantoprazole Sodium (Protonix Ec Tab)  40 mg PO 0600 [facility]


   Last Admin: 06/19/18 06:20 Dose:  40 mg


Polyethylene Glycol (Miralax)  17 gm PO DAILY [facility]


   Last Admin: 06/19/18 10:06 Dose:  17 gm


Quetiapine Fumarate (Seroquel Xr)  800 mg PO HS [facility]


   PRN Reason: Protocol


Tamsulosin HCl (Flomax)  0.4 mg PO DAILY [facility]


   Last Admin: 06/19/18 10:05 Dose:  0.4 mg


Zolpidem Tartrate (Ambien)  5 mg PO HS PRN; Protocol


   PRN Reason: Insomnia


   Last Admin: 06/18/18 18:02 Dose:  5 mg











- Labs


Labs: 


 





 06/19/18 05:45 





 06/19/18 05:45 





 











APTT  30.5 Seconds (25.1-36.5)   06/17/18  07:00    














- Additional Findings


Additional findings: 





- Constitutional


Appears: No Acute Distress





- Head Exam


Head Exam: ATRAUMATIC, NORMAL INSPECTION, NORMOCEPHALIC





- Eye Exam


Eye Exam: EOMI, Normal appearance


Pupil Exam: NORMAL ACCOMODATION





- ENT Exam


ENT Exam: Mucous Membranes Moist





- Neck Exam


Neck Exam: Full ROM





- Respiratory Exam


Respiratory Exam: Clear to Ausculation Bilateral, NORMAL BREATHING PATTERN.  

absent: Rales, Rhonchi, Wheezes





- Cardiovascular Exam


Cardiovascular Exam: REGULAR RHYTHM, +S1, +S2





- GI/Abdominal Exam


GI & Abdominal Exam: Soft, Normal Bowel Sounds.  absent: Guarding, Rigid, 

Tenderness





- Extremities Exam


Additional comments: 





Right lower extremity: dressing intact





- Back Exam


Back Exam: NORMAL INSPECTION





- Neurological Exam


Neurological Exam: Alert, Awake, Oriented x3





- Psychiatric Exam


Psychiatric exam: Normal Affect, Normal Mood





- Skin


Skin Exam: Dry, Normal Color, Warm








Assessment and Plan





- Assessment and Plan (Free Text)


Assessment: 





A/P: Patient is a 46 yo M with PMH bipolar disorder, schizoaffective disorder, 

polysubstance abuse, and right leg compartment syndrome s/p fasciotomy, 

presents for right leg cellulitis 2/2 trauma, also found to have tachycardia:





Right 5th phalangeal Osteomyelitis/Right Lower extremity Cellulitis


-Stable, afebrile, no leukocytosis


-CRP elevated, ESR elevated


-X ray foot: soft tissue swelling without acute articular or osseous 

abnormality. No osteomyelitis


-MRI foot: soft tissue swelling of dorsum and 5th digit, edema is identified 

within the fused mid and distal 5th phalanges suspcious of osteomyelitis, mild 

soft tissue swelling of the 1st and 2nd digits also suspicious of osteomyelitis


-Antibiotics: Vancomycin 1gm Q12H


-F/U wound culture


-Lower extremity US: normal VALORIE/PVR


-Pain control: Oxycontin ER 10mg PO Q12H prn, Oxycodone 15mg Q4H prn (

[facility] patient is being prescribed this 

medication by Dr Hanson)


-Colace 100mg PO TID and Miralax 17gm daily for constipation


-ID on consult, help appreciated


-Podiatry on consult, help appreciated


-Physical Therapy ordered








Tachycardia


-Currently asymptomatic


-HR 70-90s now


-Echocardiogram showed mild concentric left ventricular hypertrophy, EF 50%, 

trace MR and TR





Hx of BPH: 


-Continue flomax





Hx of Mood disorder, schizoaffective disorder 


-Continue Seroquel and fluoxetine


-Continue Depakote and Cogentin


-Depakote level 50


-Psych on consult, help appreciated





Hx of Neuropathy: 


-Continue with Neurontin 600mg QID


-Continue Mobic 15mg PO daily





Insomnia


-Ambien PRN





DVT/GI prophylaxis: 


-Heparin 5000 Q12 SC 


-Protonix 40mg PO daily





Plan discussed with Dr Precious Diamond DO PGY-1





<Cathy Lang - Last Filed: 06/19/18 13:43>





Objective





- Vital Signs/Intake and Output


Vital Signs (last 24 hours): 


 











Temp Pulse Resp BP Pulse Ox


 


 98.5 F   91 H  20   138/84   98 


 


 06/19/18 06:00  06/19/18 06:00  06/19/18 06:00  06/19/18 06:00  06/19/18 06:00











- Medications


Medications: 


 Current Medications





Acetaminophen (Tylenol 325mg Tab)  650 mg PO Q6H PRN


   PRN Reason: Pain, moderate (4-7)


Benztropine Mesylate (Cogentin)  1 mg PO [facility]HS [facility]


   Last Admin: 06/19/18 10:06 Dose:  1 mg


Clotrimazole (Lotrimin 1%)  0 gm TOP BID [facility]


   Last Admin: 06/18/18 18:10 Dose:  Not Given


Divalproex Sodium (Depakote Dr(*Bid*))  500 mg PO AMHS [facility]


   Last Admin: 06/19/18 10:04 Dose:  500 mg


Docusate Sodium (Colace)  100 mg PO TID [facility]


   Last Admin: 06/19/18 10:06 Dose:  100 mg


Fluoxetine HCl (Prozac)  40 mg PO DAILY [facility]


   Last Admin: 06/19/18 10:05 Dose:  40 mg


Gabapentin (Neurontin)  600 mg PO QID [facility]


   PRN Reason: Protocol


   Last Admin: 06/19/18 10:05 Dose:  600 mg


Heparin Sodium (Porcine) (Heparin)  5,000 units SC Q12 [facility]


   PRN Reason: Protocol


   Last Admin: 06/19/18 10:05 Dose:  5,000 units


Vancomycin HCl (Vancomycin 1gm)  1 gm in 250 mls @ 167 mls/hr IVPB Q12 [facility]


   PRN Reason: Protocol


   Last Admin: 06/19/18 11:53 Dose:  167 mls/hr


Meloxicam (Mobic)  15 mg PO DAILY [facility]


   Last Admin: 06/19/18 10:06 Dose:  15 mg


Oxycodone HCl (Oxycontin Extended Release Tab)  10 mg PO Q12 PRN


   PRN Reason: Pain, moderate (4-7)


   Stop: 06/22/18 12:01


Oxycodone HCl (Oxycodone Immediate Release Tab)  15 mg PO Q4H PRN


   PRN Reason: Pain, severe (8-10)


   Last Admin: 06/19/18 11:58 Dose:  15 mg


Pantoprazole Sodium (Protonix Ec Tab)  40 mg PO 0600 [facility]


   Last Admin: 06/19/18 06:20 Dose:  40 mg


Polyethylene Glycol (Miralax)  17 gm PO DAILY [facility]


   Last Admin: 06/19/18 10:06 Dose:  17 gm


Quetiapine Fumarate (Seroquel Xr)  800 mg PO HS [facility]


   PRN Reason: Protocol


Tamsulosin HCl (Flomax)  0.4 mg PO DAILY [facility]


   Last Admin: 06/19/18 10:05 Dose:  0.4 mg


Zolpidem Tartrate (Ambien)  5 mg PO HS PRN; Protocol


   PRN Reason: Insomnia


   Last Admin: 06/18/18 18:02 Dose:  5 mg











- Labs


Labs: 


 





 06/19/18 05:45 





 06/19/18 05:45 





 











APTT  30.5 Seconds (25.1-36.5)   06/17/18  07:00    














Attending/Attestation





- Attestation


I have personally seen and examined this patient.: Yes


I have fully participated in the care of the patient.: Yes


I have reviewed all pertinent clinical information, including history, physical 

exam and plan: Yes


Notes (Text): 





06/19/18 13:39


47 year old male with past medical history of bipolar, schizoaffective disorder

, polysubstance abuse, and right leg compartment syndrome s/p fasciotomy 

presented with right leg cellulitis.  MRI was reviewed as above suggestive of 

osteomyelitis.  Continue with iv antibiotics as per ID.  Will follow up with ID 

and podiatry recommendations.  He is on morphine prn, mobic and gabapentin for 

pain and wants to try oxycodone/oxycontin as he was prescribed by his pmd.  He 

was counselled on risks of continued substance abuse.





Cathy Lang MD


Hospitalist.

## 2018-06-19 NOTE — CP.PCM.PN
<Senia Bourne - Last Filed: 06/19/18 13:37>





Subjective





- Date & Time of Evaluation


Date of Evaluation: 06/19/18


Time of Evaluation: 13:37





- Subjective


Subjective: 





Podiatry Consult note: Dr. Albright/Dr. Hernandez





47 year old male patient was seen and evaluated for right 5th digit wound. 

Patient is AAOx3 and appears in NAD. Denies of any acute overnight events. No 

recent F/N/V/C/SOB/CP/headache. No other pedal complains. 





Objective





- Vital Signs/Intake and Output


Vital Signs (last 24 hours): 


 











Temp Pulse Resp BP Pulse Ox


 


 98.5 F   91 H  20   138/84   98 


 


 06/19/18 06:00  06/19/18 06:00  06/19/18 06:00  06/19/18 06:00  06/19/18 06:00











- Medications


Medications: 


 Current Medications





Acetaminophen (Tylenol 325mg Tab)  650 mg PO Q6H PRN


   PRN Reason: Pain, moderate (4-7)


Benztropine Mesylate (Cogentin)  1 mg PO AMHS [facility]


   Last Admin: 06/19/18 10:06 Dose:  1 mg


Clotrimazole (Lotrimin 1%)  0 gm TOP BID [facility]


   Last Admin: 06/18/18 18:10 Dose:  Not Given


Divalproex Sodium (Depakote Dr(*Bid*))  [facility]


   Last Admin: 06/19/18 10:04 Dose:  500 mg


Docusate Sodium (Colace)  100 mg PO TID [facility]


   Last Admin: 06/19/18 10:06 Dose:  100 mg


Fluoxetine HCl (Prozac)  40 mg PO DAILY [facility]


   Last Admin: 06/19/18 10:05 Dose:  40 mg


Gabapentin (Neurontin)  600 mg PO QID [facility]


   PRN Reason: Protocol


   Last Admin: 06/19/18 10:05 Dose:  600 mg


Heparin Sodium (Porcine) (Heparin)  5,000 units SC Q12 [facility]


   PRN Reason: Protocol


   Last Admin: 06/19/18 10:05 Dose:  5,000 units


Vancomycin HCl (Vancomycin 1gm)  1 gm in 250 mls @ 167 mls/hr IVPB Q12 [facility]


   PRN Reason: Protocol


   Last Admin: 06/19/18 11:53 Dose:  167 mls/hr


Meloxicam (Mobic)  15 mg PO DAILY [facility]


   Last Admin: 06/19/18 10:06 Dose:  15 mg


Oxycodone HCl (Oxycontin Extended Release Tab)  10 mg PO Q12 PRN


   PRN Reason: Pain, moderate (4-7)


   Stop: 06/22/18 12:01


Oxycodone HCl (Oxycodone Immediate Release Tab)  15 mg PO Q4H PRN


   PRN Reason: Pain, severe (8-10)


   Last Admin: 06/19/18 11:58 Dose:  15 mg


Pantoprazole Sodium (Protonix Ec Tab)  40 mg PO 0600 [facility]


   Last Admin: 06/19/18 06:20 Dose:  40 mg


Polyethylene Glycol (Miralax)  17 gm PO DAILY [facility]


   Last Admin: 06/19/18 10:06 Dose:  17 gm


Quetiapine Fumarate (Seroquel Xr)  800 mg PO HS [facility]


   PRN Reason: Protocol


Tamsulosin HCl (Flomax)  0.4 mg PO DAILY [facility]


   Last Admin: 06/19/18 10:05 Dose:  0.4 mg


Zolpidem Tartrate (Ambien)  5 mg PO HS PRN; Protocol


   PRN Reason: Insomnia


   Last Admin: 06/18/18 18:02 Dose:  5 mg











- Labs


Labs: 


 





 06/19/18 05:45 





 06/19/18 05:45 





 











APTT  30.5 Seconds (25.1-36.5)   06/17/18  07:00    














- Constitutional


Appears: Well, Non-toxic, No Acute Distress





- Extremities Exam


Additional comments: 





Bilateral LE focused exam:





VASC: DP/PT pulses are palpable 2.4, Cap refill time: < 3 sec to all digits, 

Temp gradient: warm to cool from proximal to distal on the left and warm to 

warm on the right, non-pitting edema noted on the dorsum of the foot on the 

right





DERM: superficial epidermal layer lysis with dermal layer exposed on the dorsal 

aspect of the right 5th digit, andrea wound maceration with 4th interspace 

maceration, erythema extending from the 5th digit proximally towards the ankle 

joint, mild mal odor, no active drainage, no purulence, no probe to bone





NEURO: Protective sensation grossly intact





ORTHO: pain on palpation of the wound on the right 5th digit, Prett test 

negative





- Neurological Exam


Neurological Exam: Alert, Awake, Oriented x3





- Psychiatric Exam


Psychiatric exam: Normal Affect, Normal Mood





Assessment and Plan





- Assessment and Plan (Free Text)


Assessment: 





47 year old male with extensive PMHx was evaluated for 1). right 5th digit 

ulcer 2). cellulitis


Plan: 





Patient seen and evaluated with attending Dr. Hernandez


Labs, vials and charts reviewed 


Afebrile, WBC @ 7.9


Wound cleaned with saline and dressing applied using betadine, DSD


Bactroban ordered


MRI of the right foot - suspicious for OM, clinically less likely 


Wound cultures: Staph aureus


Continue IV abx as per ID - Vancomycin 


Local wound care


Will continue to monitor patient while in-house 





<Kevin Hernandez - Last Filed: 06/20/18 07:35>





Objective





- Vital Signs/Intake and Output


Vital Signs (last 24 hours): 


 











Temp Pulse Resp BP Pulse Ox


 


 97.6 F   90   19   120/73   98 


 


 06/19/18 18:00  06/19/18 18:00  06/19/18 18:00  06/19/18 18:00  06/19/18 18:00








Intake and Output: 


 











 06/20/18 06/20/18





 06:59 18:59


 


Intake Total 1580 


 


Balance 1580 














- Medications


Medications: 


 Current Medications





Acetaminophen (Tylenol 325mg Tab)  650 mg PO Q6H PRN


   PRN Reason: Pain, moderate (4-7)


Benztropine Mesylate (Cogentin)  [facility]


   Last Admin: 06/19/18 21:13 Dose:  1 mg


Clotrimazole (Lotrimin 1%)  0 gm TOP BID [facility]


   Last Admin: 06/19/18 18:25 Dose:  Not Given


Divalproex Sodium (Depakote Dr(*Bid*))  500 mg PO [facility]HS [facility]


   Last Admin: 06/19/18 21:14 Dose:  500 mg


Docusate Sodium (Colace)  100 mg PO TID [facility]


   Last Admin: 06/19/18 18:45 Dose:  100 mg


Fluoxetine HCl (Prozac)  40 mg PO DAILY [facility]


   Last Admin: 06/19/18 10:05 Dose:  40 mg


Gabapentin (Neurontin)  600 mg PO QID [facility]


   PRN Reason: Protocol


   Last Admin: 06/19/18 21:14 Dose:  600 mg


Heparin Sodium (Porcine) (Heparin)  5,000 units SC Q12 [facility]


   PRN Reason: Protocol


   Last Admin: 06/19/18 21:15 Dose:  5,000 units


Vancomycin HCl (Vancomycin 1gm)  1 gm in 250 mls @ 167 mls/hr IVPB Q12 [facility]


   PRN Reason: Protocol


   Last Admin: 06/19/18 21:17 Dose:  167 mls/hr


Ketorolac Tromethamine (Toradol)  15 mg IVP Q6H PRN


   PRN Reason: Pain, moderate (4-7)


   Last Admin: 06/20/18 00:35 Dose:  15 mg


Meloxicam (Mobic)  15 mg PO DAILY [facility]


   Last Admin: 06/19/18 10:06 Dose:  15 mg


Morphine Sulfate (Morphine)  2 mg IVP Q6H PRN


   PRN Reason: Pain, severe (8-10)


   Last Admin: 06/20/18 05:39 Dose:  2 mg


Pantoprazole Sodium (Protonix Ec Tab)  40 mg PO 0600 [facility]


   Last Admin: 06/20/18 05:39 Dose:  40 mg


Polyethylene Glycol (Miralax)  17 gm PO DAILY [facility]


   Last Admin: 06/19/18 10:06 Dose:  17 gm


Quetiapine Fumarate (Seroquel Xr)  800 mg PO HS [facility]


   PRN Reason: Protocol


   Last Admin: 06/19/18 21:14 Dose:  800 mg


Tamsulosin HCl (Flomax)  0.4 mg PO DAILY [facility]


   Last Admin: 06/19/18 10:05 Dose:  0.4 mg


Zolpidem Tartrate (Ambien)  5 mg PO HS PRN; Protocol


   PRN Reason: Insomnia


   Last Admin: 06/19/18 22:45 Dose:  5 mg











- Labs


Labs: 


 





 06/20/18 06:00 





 06/20/18 06:00 





 











APTT  30.5 Seconds (25.1-36.5)   06/17/18  07:00    














Attending/Attestation





- Attestation


I have personally seen and examined this patient.: Yes


I have fully participated in the care of the patient.: Yes


I have reviewed all pertinent clinical information, including history, physical 

exam and plan: Yes

## 2018-06-20 LAB
BASOPHILS # BLD AUTO: 0.02 K/MM3 (ref 0–2)
BASOPHILS NFR BLD: 0.3 % (ref 0–3)
BUN SERPL-MCNC: 13 MG/DL (ref 7–21)
CALCIUM SERPL-MCNC: 8.8 MG/DL (ref 8.4–10.5)
EOSINOPHIL # BLD: 0.2 10*3/UL (ref 0–0.7)
EOSINOPHIL NFR BLD: 3.1 % (ref 1.5–5)
ERYTHROCYTE [DISTWIDTH] IN BLOOD BY AUTOMATED COUNT: 12.9 % (ref 11.5–14.5)
GFR NON-AFRICAN AMERICAN: > 60
GRANULOCYTES # BLD: 4.45 10*3/UL (ref 1.4–6.5)
GRANULOCYTES NFR BLD: 57.8 % (ref 50–68)
HGB BLD-MCNC: 13.1 G/DL (ref 14–18)
LYMPHOCYTES # BLD: 2.6 10*3/UL (ref 1.2–3.4)
LYMPHOCYTES NFR BLD AUTO: 33.2 % (ref 22–35)
MCH RBC QN AUTO: 29.8 PG (ref 25–35)
MCHC RBC AUTO-ENTMCNC: 34.3 G/DL (ref 31–37)
MCV RBC AUTO: 86.8 FL (ref 80–105)
MONOCYTES # BLD AUTO: 0.4 10*3/UL (ref 0.1–0.6)
MONOCYTES NFR BLD: 5.6 % (ref 1–6)
PLATELET # BLD: 161 10^3/UL (ref 120–450)
PMV BLD AUTO: 11.3 FL (ref 7–11)
RBC # BLD AUTO: 4.4 10^6/UL (ref 3.5–6.1)
WBC # BLD AUTO: 7.7 10^3/UL (ref 4.5–11)

## 2018-06-20 RX ADMIN — POLYETHYLENE GLYCOL 3350 SCH: 17 POWDER, FOR SOLUTION ORAL at 09:46

## 2018-06-20 RX ADMIN — PANTOPRAZOLE SODIUM SCH MG: 40 TABLET, DELAYED RELEASE ORAL at 05:39

## 2018-06-20 RX ADMIN — VANCOMYCIN HYDROCHLORIDE SCH MLS/HR: 1 INJECTION, POWDER, LYOPHILIZED, FOR SOLUTION INTRAVENOUS at 09:41

## 2018-06-20 RX ADMIN — VANCOMYCIN HYDROCHLORIDE SCH MLS/HR: 1 INJECTION, POWDER, LYOPHILIZED, FOR SOLUTION INTRAVENOUS at 22:22

## 2018-06-20 RX ADMIN — QUETIAPINE SCH MG: 50 TABLET, EXTENDED RELEASE ORAL at 22:21

## 2018-06-20 RX ADMIN — CEFTRIAXONE SCH MLS/HR: 2 INJECTION, POWDER, FOR SOLUTION INTRAMUSCULAR; INTRAVENOUS at 15:08

## 2018-06-20 RX ADMIN — MORPHINE SULFATE PRN MG: 2 INJECTION, SOLUTION INTRAMUSCULAR; INTRAVENOUS at 17:53

## 2018-06-20 RX ADMIN — MORPHINE SULFATE PRN MG: 2 INJECTION, SOLUTION INTRAMUSCULAR; INTRAVENOUS at 11:46

## 2018-06-20 RX ADMIN — CLOTRIMAZOLE SCH APPL: 1 CREAM TOPICAL at 12:00

## 2018-06-20 RX ADMIN — DIVALPROEX SODIUM SCH MG: 500 TABLET, DELAYED RELEASE ORAL at 09:43

## 2018-06-20 RX ADMIN — CLOTRIMAZOLE SCH APPL: 1 CREAM TOPICAL at 18:28

## 2018-06-20 RX ADMIN — MORPHINE SULFATE PRN MG: 2 INJECTION, SOLUTION INTRAMUSCULAR; INTRAVENOUS at 05:39

## 2018-06-20 RX ADMIN — MORPHINE SULFATE PRN MG: 2 INJECTION, SOLUTION INTRAMUSCULAR; INTRAVENOUS at 23:34

## 2018-06-20 RX ADMIN — DIVALPROEX SODIUM SCH MG: 500 TABLET, DELAYED RELEASE ORAL at 22:19

## 2018-06-20 NOTE — CP.PCM.PN
<SteffenJefemark - Last Filed: 06/20/18 14:09>





Subjective





- Date & Time of Evaluation


Date of Evaluation: 06/20/18


Time of Evaluation: 14:05





- Subjective


Subjective: 





Podiatry Consult note: Dr. Albright/Dr. Hernandez





47 year old male patient was seen and evaluated for right 5th digit wound. 

Patient is AAOx3 and appears in NAD. Denies of any acute overnight events. No 

recent F/N/V/C/SOB/CP/headache. No other pedal complains. 





Objective





- Vital Signs/Intake and Output


Vital Signs (last 24 hours): 


 











Temp Pulse Resp BP Pulse Ox


 


 97.7 F   80   18   142/81   96 


 


 06/20/18 08:57  06/20/18 08:57  06/20/18 08:57  06/20/18 08:57  06/20/18 08:57








Intake and Output: 


 











 06/20/18 06/20/18





 06:59 18:59


 


Intake Total 1580 


 


Balance 1580 














- Medications


Medications: 


 Current Medications





Acetaminophen (Tylenol 325mg Tab)  650 mg PO Q6H PRN


   PRN Reason: Pain, moderate (4-7)


Benztropine Mesylate (Cogentin)  1 mg PO AMHS [facility]


   Last Admin: 06/20/18 09:43 Dose:  1 mg


Clotrimazole (Lotrimin 1%)  0 gm TOP BID [facility]


   Last Admin: 06/19/18 18:25 Dose:  Not Given


Divalproex Sodium (Depakote Dr(*Bid*))  500 mg PO AMHS [facility]


   Last Admin: 06/20/18 09:43 Dose:  500 mg


Docusate Sodium (Colace)  100 mg PO TID [facility]


   Last Admin: 06/20/18 09:45 Dose:  100 mg


Fluoxetine HCl (Prozac)  40 mg PO DAILY [facility]


   Last Admin: 06/20/18 09:43 Dose:  40 mg


Gabapentin (Neurontin)  600 mg PO QID [facility]


   PRN Reason: Protocol


   Last Admin: 06/20/18 09:45 Dose:  600 mg


Heparin Sodium (Porcine) (Heparin)  5,000 units SC Q12 HOME


   PRN Reason: Protocol


   Last Admin: 06/20/18 09:45 Dose:  5,000 units


Vancomycin HCl (Vancomycin 1gm)  1 gm in 250 mls @ 167 mls/hr IVPB Q12 HOME


   PRN Reason: Protocol


   Last Admin: 06/20/18 09:41 Dose:  167 mls/hr


Meloxicam (Mobic)  15 mg PO DAILY [facility]


   Last Admin: 06/20/18 09:43 Dose:  15 mg


Morphine Sulfate (Morphine)  2 mg IVP Q6H PRN


   PRN Reason: Pain, severe (8-10)


   Last Admin: 06/20/18 11:46 Dose:  2 mg


Pantoprazole Sodium (Protonix Ec Tab)  40 mg PO 0600 [facility]


   Last Admin: 06/20/18 05:39 Dose:  40 mg


Polyethylene Glycol (Miralax)  17 gm PO DAILY HOME


   Last Admin: 06/20/18 09:46 Dose:  Not Given


Quetiapine Fumarate (Seroquel Xr)  800 mg PO HS [facility]


   PRN Reason: Protocol


   Last Admin: 06/19/18 21:14 Dose:  800 mg


Tamsulosin HCl (Flomax)  0.4 mg PO DAILY [facility]


   Last Admin: 06/20/18 09:45 Dose:  0.4 mg


Zolpidem Tartrate (Ambien)  5 mg PO HS PRN; Protocol


   PRN Reason: Insomnia


   Last Admin: 06/19/18 22:45 Dose:  5 mg











- Labs


Labs: 


 





 06/20/18 06:00 





 06/20/18 06:00 





 











APTT  30.5 Seconds (25.1-36.5)   06/17/18  07:00    














- Constitutional


Appears: Well, Non-toxic, No Acute Distress





- Extremities Exam


Additional comments: 





Bilateral LE focused exam:





VASC: DP/PT pulses are palpable 2/4, Cap refill time: < 3 sec to all digits, 

Temp gradient: warm to cool from proximal to distal on the left and warm to 

warm on the right, non-pitting edema noted on the dorsum of the foot on the 

right





DERM: superficial epidermal layer lysis with dermal layer exposed on the dorsal 

aspect of the right 5th digit, andrea wound maceration with 4th interspace 

maceration, erythema extending from the 5th digit proximally towards the ankle 

joint - improving, mild mal odor, no active drainage, no purulence, no probe to 

bone





NEURO: Protective sensation grossly intact





ORTHO: pain on palpation of the wound on the right 5th digit, Prett test 

negative





- Neurological Exam


Neurological Exam: Alert, Awake, Oriented x3





- Psychiatric Exam


Psychiatric exam: Normal Affect, Normal Mood





Assessment and Plan





- Assessment and Plan (Free Text)


Assessment: 





47 year old male with extensive PMHx was evaluated for 1). right 5th digit 

ulcer 2). cellulitis


Plan: 





Patient seen and evaluated with attending Dr. Albright


Labs, vitals and charts reviewed 


Afebrile, WBC @ 7.7


Wound cleaned with saline and dressing applied using maxorb and DSD


Bactroban ordered


MRI of the right foot - suspicious for OM, clinically less likely 


Continue IV abx as per ID - Vancomycin 


Patient may require long term antibiotics


Local wound care


Will continue to monitor patient while in-house


Upon discharge[facility] 





<Linda Albright - Last Filed: 07/01/18 13:24>





Objective





- Vital Signs/Intake and Output


Vital Signs (last 24 hours): 


 











Temp Pulse Resp BP Pulse Ox


 


 98.0 F   90   20   139/98 H  97 


 


 06/27/18 06:00  06/27/18 06:00  06/27/18 06:00  06/27/18 06:00  06/27/18 06:00











- Labs


Labs: 


 





 06/26/18 06:20 





 06/26/18 06:20 





 











APTT  30.5 Seconds (25.1-36.5)   06/17/18  07:00    














Attending/Attestation





- Attestation


I have personally seen and examined this patient.: Yes


I have fully participated in the care of the patient.: Yes


I have reviewed all pertinent clinical information, including history, physical 

exam and plan: Yes


Notes (Text): 





07/01/18 13:23


Spoke at length with patients sister who is POA - sister stated she feels it 

best for pt to go to a subacute rehab for IV antibiotics because of the drug 

problem and issues -

## 2018-06-20 NOTE — PN
DATE:  06/19/2018



SUBJECTIVE:  Patient was seen earlier today, in no acute distress,

nontoxic.  No fevers.



PHYSICAL EXAMINATION:

VITAL SIGNS:  Temperature is 98, blood pressure is 120/70, respiratory rate

of 16.

HEENT:  Unremarkable.

NECK:  Supple.

LUNGS:  Decreased breath sounds.

HEART:  Normal S1, S2.

ABDOMEN:  Soft, nontender.



LABORATORY DATA:  Reveals the patient to have white count of 7.9,

hemoglobin of 13 and sed rate is 35.  Chemistry reveals a BUN of 12,

creatinine of 0.8, C-reactive protein is greater than 15.  Urinalysis is

noted.  Microbiology reveals MRSA is not detected.  Patient ____ CAT scan. 

Review of orders reveals patient is on vancomycin.  Review of medications

noted.  Microbiology is reviewed.



ASSESSMENT AND PLAN:  This is a 47-year-old male who has right foot

cellulitis, cyanosis of the right fifth toe, with history of

schizoaffective disorder, bipolar, anxiety, on vancomycin.  Patient had an

MRI of the foot with edema within the fused mid and distal fifth phalanges

suspicious for osteomyelitis and an elevated sedimentation rate of 35 and

elevated C-reactive protein of greater than 15.  We will continue with

present course of vancomycin _____.







__________________________________________

Jason Lopez MD



DD:  06/19/2018 21:48:28

DT:  06/19/2018 23:53:05

Job # [id number]

## 2018-06-20 NOTE — CP.PCM.PN
<Luo Diamond - Last Filed: 06/20/18 15:29>





Subjective





- Date & Time of Evaluation


Date of Evaluation: 06/20/18


Time of Evaluation: 11:24





- Subjective


Subjective: 





Internal Medicine Progress Note - Hospitalist Service





Patient seen and examined at bedside. Per nursing no acute events overnight. 

Patient is doing well, offering no acute complaints at this time. Patient 

recieving IV Vancomycin for cellulitis/osteomyelitis. Denies headaches, 

dizziness, cp, palpitations, sob, abdominal pain, urinary symptoms, changes in 

bowel habits.  





Objective





- Vital Signs/Intake and Output


Vital Signs (last 24 hours): 


 











Temp Pulse Resp BP Pulse Ox


 


 97.7 F   80   18   142/81   96 


 


 06/20/18 08:57  06/20/18 08:57  06/20/18 08:57  06/20/18 08:57  06/20/18 08:57








Intake and Output: 


 











 06/20/18 06/20/18





 06:59 18:59


 


Intake Total 1580 


 


Balance 1580 














- Medications


Medications: 


 Current Medications





Acetaminophen (Tylenol 325mg Tab)  650 mg PO Q6H PRN


   PRN Reason: Pain, moderate (4-7)


Benztropine Mesylate (Cogentin)  1 mg PO AMHS [facility]


   Last Admin: 06/20/18 09:43 Dose:  1 mg


Clotrimazole (Lotrimin 1%)  0 gm TOP BID [facility]


   Last Admin: 06/19/18 18:25 Dose:  Not Given


Divalproex Sodium (Depakote Dr(*Bid*))  500 mg PO AMHS [facility]


   Last Admin: 06/20/18 09:43 Dose:  500 mg


Docusate Sodium (Colace)  100 mg PO TID [facility]


   Last Admin: 06/20/18 09:45 Dose:  100 mg


Fluoxetine HCl (Prozac)  40 mg PO DAILY [facility]


   Last Admin: 06/20/18 09:43 Dose:  40 mg


Gabapentin (Neurontin)  600 mg PO QID [facility]


   PRN Reason: Protocol


   Last Admin: 06/20/18 09:45 Dose:  600 mg


Heparin Sodium (Porcine) (Heparin)  5,000 units SC Q12 HOME


   PRN Reason: Protocol


   Last Admin: 06/20/18 09:45 Dose:  5,000 units


Vancomycin HCl (Vancomycin 1gm)  1 gm in 250 mls @ 167 mls/hr IVPB Q12 HOME


   PRN Reason: Protocol


   Last Admin: 06/20/18 09:41 Dose:  167 mls/hr


Ketorolac Tromethamine (Toradol)  15 mg IVP Q6H PRN


   PRN Reason: Pain, moderate (4-7)


   Last Admin: 06/20/18 09:43 Dose:  15 mg


Meloxicam (Mobic)  15 mg PO DAILY [facility]


   Last Admin: 06/20/18 09:43 Dose:  15 mg


Morphine Sulfate (Morphine)  2 mg IVP Q6H PRN


   PRN Reason: Pain, severe (8-10)


   Last Admin: 06/20/18 05:39 Dose:  2 mg


Pantoprazole Sodium (Protonix Ec Tab)  40 mg PO 0600 [facility]


   Last Admin: 06/20/18 05:39 Dose:  40 mg


Polyethylene Glycol (Miralax)  17 gm PO DAILY [facility]


   Last Admin: 06/20/18 09:46 Dose:  Not Given


Quetiapine Fumarate (Seroquel Xr)  800 mg PO HS [facility]


   PRN Reason: Protocol


   Last Admin: 06/19/18 21:14 Dose:  800 mg


Tamsulosin HCl (Flomax)  0.4 mg PO DAILY [facility]


   Last Admin: 06/20/18 09:45 Dose:  0.4 mg


Zolpidem Tartrate (Ambien)  5 mg PO HS PRN; Protocol


   PRN Reason: Insomnia


   Last Admin: 06/19/18 22:45 Dose:  5 mg











- Labs


Labs: 


 





 06/20/18 06:00 





 06/20/18 06:00 





 











APTT  30.5 Seconds (25.1-36.5)   06/17/18  07:00    














- Additional Findings


Additional findings: 





- Constitutional


Appears: No Acute Distress





- Head Exam


Head Exam: ATRAUMATIC, NORMAL INSPECTION, NORMOCEPHALIC





- Eye Exam


Eye Exam: EOMI, Normal appearance


Pupil Exam: NORMAL ACCOMODATION





- ENT Exam


ENT Exam: Mucous Membranes Moist





- Neck Exam


Neck Exam: Full ROM





- Respiratory Exam


Respiratory Exam: Clear to Ausculation Bilateral, NORMAL BREATHING PATTERN.  

absent: Rales, Rhonchi, Wheezes





- Cardiovascular Exam


Cardiovascular Exam: REGULAR RHYTHM, +S1, +S2





- GI/Abdominal Exam


GI & Abdominal Exam: Soft, Normal Bowel Sounds.  absent: Guarding, Rigid, 

Tenderness





- Extremities Exam


Additional comments: 





Right lower extremity: dressing intact





- Back Exam


Back Exam: NORMAL INSPECTION





- Neurological Exam


Neurological Exam: Alert, Awake, Oriented x3





- Psychiatric Exam


Psychiatric exam: Normal Affect, Normal Mood





- Skin


Skin Exam: Dry, Normal Color, Warm





Assessment and Plan





- Assessment and Plan (Free Text)


Assessment: 





A/P: Patient is a 48 yo M with PMH bipolar disorder, schizoaffective disorder, 

polysubstance abuse, and right leg compartment syndrome s/p fasciotomy, 

presents for right leg cellulitis 2/2 trauma, also found to have tachycardia:





Right 5th phalangeal Osteomyelitis/Right Lower extremity Cellulitis


-Stable, afebrile, no leukocytosis


-CRP elevated, ESR elevated


-X ray foot: soft tissue swelling without acute articular or osseous 

abnormality. No osteomyelitis


-MRI foot: soft tissue swelling of dorsum and 5th digit, edema is identified 

within the fused mid and distal 5th phalanges suspcious of osteomyelitis, mild 

soft tissue swelling of the 1st and 2nd digits also suspicious of osteomyelitis


-Antibiotics: Vancomycin 1gm Q12H, Rocephin 1gm daily (when clear for discharge

, can D/C with IV Daptomycin)


-F/U Vanco trough 


-F/U wound culture


-Lower extremity US: normal VALORIE/PVR


-Pain control: Morphine 2mg Q6H prn pain


-Colace 100mg PO TID and Miralax 17gm daily for constipation


-ID on consult, help appreciated


-Podiatry on consult, help appreciated


-Physical Therapy ordered





Tachycardia


-Currently asymptomatic


-HR 70-90s now


-Echocardiogram showed mild concentric left ventricular hypertrophy, EF 50%, 

trace MR and TR





Hx of BPH: 


-Continue flomax





Hx of Mood disorder, schizoaffective disorder 


-Continue Seroquel and fluoxetine


-Continue Depakote and Cogentin


-Depakote level 50


-Psych on consult, help appreciated





Hx of Neuropathy: 


-Continue with Neurontin 600mg QID


-Continue Mobic 15mg PO daily





Insomnia


-Ambien PRN





DVT/GI prophylaxis: 


-Heparin 5000 Q12 SC 


-Protonix 40mg PO daily





DISPO: Patient will follow up with PMD, Dr Hanson upon discharge. Discussed 

case with Podiatry, surgery unlikely at this time. Patient requires long term 

IV antibiotics. Per case management, patient does not have [employer] benefits w/[employer] 

[employer]. Per sister, Medicaid has been reinstated and she will contact admitting 

department. We will follow up. Plan discussed with Dr Precious Diamond DO PGY-1











<Cathy Lang - Last Filed: 06/20/18 16:16>





Objective





- Vital Signs/Intake and Output


Vital Signs (last 24 hours): 


 











Temp Pulse Resp BP Pulse Ox


 


 97.7 F   80   18   142/81   96 


 


 06/20/18 08:57  06/20/18 08:57  06/20/18 08:57  06/20/18 08:57  06/20/18 08:57








Intake and Output: 


 











 06/20/18 06/20/18





 06:59 18:59


 


Intake Total 1580 720


 


Balance 1580 720














- Medications


Medications: 


 Current Medications





Acetaminophen (Tylenol 325mg Tab)  650 mg PO Q6H PRN


   PRN Reason: Pain, moderate (4-7)


Benztropine Mesylate (Cogentin)  1 mg PO AMHS [facility]


   Last Admin: 06/20/18 09:43 Dose:  1 mg


Clotrimazole (Lotrimin 1%)  0 gm TOP BID [facility]


   Last Admin: 06/20/18 12:00 Dose:  1 appl


Divalproex Sodium (Depakote Dr(*Bid*))  500 mg PO [facility]


   Last Admin: 06/20/18 09:43 Dose:  500 mg


Docusate Sodium (Colace)  100 mg PO TID [facility]


   Last Admin: 06/20/18 14:59 Dose:  100 mg


Fluoxetine HCl (Prozac)  40 mg PO DAILY [facility]


   Last Admin: 06/20/18 09:43 Dose:  40 mg


Gabapentin (Neurontin)  600 mg PO QID [facility]


   PRN Reason: Protocol


   Last Admin: 06/20/18 15:03 Dose:  600 mg


Heparin Sodium (Porcine) (Heparin)  5,000 units SC Q12 [facility]


   PRN Reason: Protocol


   Last Admin: 06/20/18 09:45 Dose:  5,000 units


Vancomycin HCl (Vancomycin 1gm)  1 gm in 250 mls @ 167 mls/hr IVPB Q12 [facility]


   PRN Reason: Protocol


   Last Admin: 06/20/18 09:41 Dose:  167 mls/hr


Ceftriaxone Sodium (Rocephin 2 Gm Ivpb)  2 gm in 100 mls @ 100 mls/hr IVPB 

DAILY [facility]


   PRN Reason: Protocol


   Stop: 07/18/18 14:57


   Last Admin: 06/20/18 15:08 Dose:  100 mls/hr


Meloxicam (Mobic)  15 mg PO DAILY [facility]


   Last Admin: 06/20/18 09:43 Dose:  15 mg


Morphine Sulfate (Morphine)  2 mg IVP Q6H PRN


   PRN Reason: Pain, severe (8-10)


   Last Admin: 06/20/18 11:46 Dose:  2 mg


Pantoprazole Sodium (Protonix Ec Tab)  40 mg PO 0600 [facility]


   Last Admin: 06/20/18 05:39 Dose:  40 mg


Polyethylene Glycol (Miralax)  17 gm PO DAILY [facility]


   Last Admin: 06/20/18 09:46 Dose:  Not Given


Quetiapine Fumarate (Seroquel Xr)  800 mg PO HS HOME


   PRN Reason: Protocol


   Last Admin: 06/19/18 21:14 Dose:  800 mg


Tamsulosin HCl (Flomax)  0.4 mg PO DAILY [facility]


   Last Admin: 06/20/18 09:45 Dose:  0.4 mg


Zolpidem Tartrate (Ambien)  5 mg PO HS PRN; Protocol


   PRN Reason: Insomnia


   Last Admin: 06/19/18 22:45 Dose:  5 mg











- Labs


Labs: 


 





 06/20/18 06:00 





 06/20/18 06:00 





 











APTT  30.5 Seconds (25.1-36.5)   06/17/18  07:00    














Attending/Attestation





- Attestation


I have personally seen and examined this patient.: Yes


I have fully participated in the care of the patient.: Yes


I have reviewed all pertinent clinical information, including history, physical 

exam and plan: Yes


Notes (Text): 





06/20/18 16:15


47 year old male with past medical history of bipolar, schizoaffective disorder

, polysubstance abuse, and right leg compartment syndrome s/p fasciotomy 

presented with right leg cellulitis.  MRI was reviewed as above suggestive of 

osteomyelitis.  Continue with iv antibiotics as per ID.  May need 4-6 weeks of 

antibiotics; will discuss with ID and podiatry.  He is on morphine prn, mobic 

and gabapentin for pain.  He was counselled on risks of continued substance 

abuse.





Cathy Lang MD


Hospitalist.

## 2018-06-20 NOTE — PN
DATE:  06/20/2018



SUBJECTIVE:  The patient was followed up today.  The patient has a long

history of either bipolar disorder or schizoaffective disorder.  The

patient was recently discharged against medical advice from Psychiatric

Inpatient Unit.  The patient was admitted on the medical site for right leg

possible osteomyelitis.  Patient is currently on IV antibiotics.  The

patient was followed up by this writer yesterday, adjusted his medication,

Mobic was increased for pain, also Seroquel was given as extended-release

and only once a day dose.  The patient is tolerating that change well.  The

patient presented to be less confused, more organized, pleasant, smiling

back to this writer, very appreciative.  The patient reported that his mood

is improving.  He sleeps fine.  His speech is improving as well.  So as per

nursing report, patient is compliant with the medication, does not have any

aggressive or angry outburst.  Patient is compliant with the treatment, but

at times asking for pain medication and fixated on pain killers.



PHYSICAL EXAMINATION:

VITAL SIGNS:  Seems to be stable.  Temperature 97.7, pulse is 80,

respirations 18, blood pressure 142/81, oxygen saturation is 96.



MEDICATIONS:  Reviewed.  The patient is on Tylenol, Cogentin 1 mg twice a

day, Rocephin IV, Lotrimin, Depakote 500 mg twice a day, Colace, Prozac 40

mg daily, Neurontin 600 mg four times a day, heparin, Mobic 15 mg daily and

is maximum dose, morphine 2 mg IV push every 6 hours as needed.  Also

MiraLax, Seroquel 800 mg at the nighttime extended-release, Flomax,

vancomycin as well as Ambien 5 mg at the nighttime as needed for insomnia.



LABORATORY DATA:  Labs were reviewed from today.  Hemoglobin is 13,

hematocrit 38.2.  Coagulation reviewed.  Chemistry reviewed.  Toxicology

reviewed.



MENTAL STATUS EXAMINATION:  The patient presented to be alert and oriented,

pleasant, cooperative.  Good eye contact.  Speech was normal rate, tone,

quality and quantity.  Mood described "I feel much better."  Affect was

constricted but reactive.  Mood congruent.  Thought process was coherent

and goal directed.  Thought content, the patient is less paranoid, less

psychotic.  Thought processes were organized.  Insight and judgment

improving.  Impulses are well controlled.



IMPRESSION:  As per history, bipolar disorder versus schizoaffective

disorder, history of polysubstance abuse and dependence, rule out

substance-induced psychosis.  At present moment, the patient is diagnosed

with osteomyelitis required to be on antibiotics.



PLAN:  Continue current management.  Continue current medications.  This

writer will follow up on this patient.  The patient allowed this writer to

give a call to his sister.  From the psychiatric standpoint, the patient is

doing very much better, does not have aggression, psychosis is much better,

and the patient is pleasant and cooperative, participated in treatment

plan.  We will follow up every other day.  Should you have any questions,

give me a call back.



Thank you very much.





__________________________________________

Jennifer Corey MD



DD:  06/20/2018 16:29:15

DT:  06/20/2018 16:30:32

Job # [id number]

## 2018-06-20 NOTE — PN
DATE:  06/20/2018



SUBJECTIVE:  Patient is in bed, in no acute distress, nontoxic.  No fevers,

no chills.



PHYSICAL EXAMINATAION:

VITAL SIGNS:  Temperature is 98, blood pressure is 120/70, respiratory rate

of 16.

HEENT:  Unremarkable.

NECK:  Supple.

LUNGS:  Have decreased breath sounds.

HEART:  Normal S1, S2.

ABDOMEN:  Soft, nontender.



LABORATORY EXAMINATION:  Reveals the patient's white count is 7.7, sed rate

is 35, BUN of 13, creatinine of 0.7, and C-reactive protein is greater than

15.  Microbiology reveals the nasal MRSA is negative.  The wound cultures

are not collected.  A vanco trough is pending.  Patient is currently on

vancomycin and ceftriaxone and Dr. Bourne's progress note is reviewed from

today.



ASSESSMENT AND PLAN:  This is a 47-year-old male who has right foot

cellulitis and osteomyelitis by MRI in a patient with schizoaffective

disorder, bipolar and anxiety, with MRI consistent with osteomyelitis and

elevated sed rate and a C-reactive protein at greater than 15.  We will

continue on vancomycin and ceftriaxone.  The ceftriaxone is 2 g IV every 24

hours.  Recommend CBC, SMA-18, sed rate, C-reactive protein.  Recommend 4

to 6 weeks of antibiotics with vancomycin trough level once weekly in

addition to the inflammatory, CBC, and chemistry markers to be followed. 

Case discussed with resident.







__________________________________________

Jason Lopez MD



DD:  06/20/2018 16:15:34

DT:  06/20/2018 16:17:34

Job # [id number]

## 2018-06-21 LAB
BASOPHILS # BLD AUTO: 0.03 K/MM3 (ref 0–2)
BASOPHILS NFR BLD: 0.3 % (ref 0–3)
BUN SERPL-MCNC: 12 MG/DL (ref 7–21)
CALCIUM SERPL-MCNC: 8.9 MG/DL (ref 8.4–10.5)
EOSINOPHIL # BLD: 0.2 10*3/UL (ref 0–0.7)
EOSINOPHIL NFR BLD: 2.3 % (ref 1.5–5)
ERYTHROCYTE [DISTWIDTH] IN BLOOD BY AUTOMATED COUNT: 13 % (ref 11.5–14.5)
GFR NON-AFRICAN AMERICAN: > 60
GRANULOCYTES # BLD: 5.51 10*3/UL (ref 1.4–6.5)
GRANULOCYTES NFR BLD: 63.1 % (ref 50–68)
HGB BLD-MCNC: 13 G/DL (ref 14–18)
LYMPHOCYTES # BLD: 2.5 10*3/UL (ref 1.2–3.4)
LYMPHOCYTES NFR BLD AUTO: 28 % (ref 22–35)
MCH RBC QN AUTO: 29.7 PG (ref 25–35)
MCHC RBC AUTO-ENTMCNC: 33.9 G/DL (ref 31–37)
MCV RBC AUTO: 87.4 FL (ref 80–105)
MONOCYTES # BLD AUTO: 0.6 10*3/UL (ref 0.1–0.6)
MONOCYTES NFR BLD: 6.3 % (ref 1–6)
PLATELET # BLD: 158 10^3/UL (ref 120–450)
PMV BLD AUTO: 11 FL (ref 7–11)
RBC # BLD AUTO: 4.38 10^6/UL (ref 3.5–6.1)
WBC # BLD AUTO: 8.7 10^3/UL (ref 4.5–11)

## 2018-06-21 RX ADMIN — DIVALPROEX SODIUM SCH MG: 500 TABLET, DELAYED RELEASE ORAL at 09:37

## 2018-06-21 RX ADMIN — MORPHINE SULFATE PRN MG: 2 INJECTION, SOLUTION INTRAMUSCULAR; INTRAVENOUS at 16:00

## 2018-06-21 RX ADMIN — POLYETHYLENE GLYCOL 3350 SCH GM: 17 POWDER, FOR SOLUTION ORAL at 09:38

## 2018-06-21 RX ADMIN — VANCOMYCIN HYDROCHLORIDE SCH MLS/HR: 1 INJECTION, POWDER, LYOPHILIZED, FOR SOLUTION INTRAVENOUS at 11:19

## 2018-06-21 RX ADMIN — PANTOPRAZOLE SODIUM SCH MG: 40 TABLET, DELAYED RELEASE ORAL at 06:09

## 2018-06-21 RX ADMIN — MORPHINE SULFATE PRN MG: 2 INJECTION, SOLUTION INTRAMUSCULAR; INTRAVENOUS at 09:40

## 2018-06-21 RX ADMIN — QUETIAPINE SCH MG: 50 TABLET, EXTENDED RELEASE ORAL at 22:41

## 2018-06-21 RX ADMIN — CLOTRIMAZOLE SCH APPL: 1 CREAM TOPICAL at 09:39

## 2018-06-21 RX ADMIN — MORPHINE SULFATE PRN MG: 2 INJECTION, SOLUTION INTRAMUSCULAR; INTRAVENOUS at 22:40

## 2018-06-21 RX ADMIN — CEFTRIAXONE SCH MLS/HR: 2 INJECTION, POWDER, FOR SOLUTION INTRAMUSCULAR; INTRAVENOUS at 09:39

## 2018-06-21 RX ADMIN — DIVALPROEX SODIUM SCH MG: 500 TABLET, DELAYED RELEASE ORAL at 22:40

## 2018-06-21 RX ADMIN — CLOTRIMAZOLE SCH APPL: 1 CREAM TOPICAL at 17:55

## 2018-06-21 NOTE — CP.PCM.PN
<Senia Bourne - Last Filed: 06/21/18 09:57>





Subjective





- Date & Time of Evaluation


Date of Evaluation: 06/21/18


Time of Evaluation: 09:57





- Subjective


Subjective: 





Podiatry Consult note: Dr. Albright/Dr. Hernandez





47 year old male patient was seen and evaluated for right 5th digit wound. 

Patient is AAOx3 and appears in NAD. Denies of any acute overnight events. No 

recent F/N/V/C/SOB/CP/headache. No other pedal complains. 





Objective





- Vital Signs/Intake and Output


Vital Signs (last 24 hours): 


 











Temp Pulse Resp BP Pulse Ox


 


 97.2 F L  72   18   123/70   96 


 


 06/21/18 08:30  06/21/18 08:30  06/21/18 08:30  06/21/18 08:30  06/21/18 08:30








Intake and Output: 


 











 06/21/18 06/21/18





 06:59 18:59


 


Intake Total 1640 360


 


Output Total  350


 


Balance 1640 10














- Medications


Medications: 


 Current Medications





Acetaminophen (Tylenol 325mg Tab)  650 mg PO Q6H PRN


   PRN Reason: Pain, moderate (4-7)


Benztropine Mesylate (Cogentin)  1 mg PO AMHS [facility]


   Last Admin: 06/21/18 09:37 Dose:  1 mg


Clotrimazole (Lotrimin 1%)  0 gm TOP BID [facility]


   Last Admin: 06/21/18 09:39 Dose:  1 appl


Divalproex Sodium (Depakote Dr(*Bid*))  500 mg PO AMHS [facility]


   Last Admin: 06/21/18 09:37 Dose:  500 mg


Docusate Sodium (Colace)  100 mg PO TID [facility]


   Last Admin: 06/21/18 09:37 Dose:  100 mg


Fluoxetine HCl (Prozac)  40 mg PO DAILY [facility]


   Last Admin: 06/21/18 09:37 Dose:  40 mg


Gabapentin (Neurontin)  600 mg PO QID [facility]


   PRN Reason: Protocol


   Last Admin: 06/21/18 09:38 Dose:  600 mg


Heparin Sodium (Porcine) (Heparin)  5,000 units SC Q12 HOME


   PRN Reason: Protocol


   Last Admin: 06/21/18 09:38 Dose:  5,000 units


Vancomycin HCl (Vancomycin 1gm)  1 gm in 250 mls @ 167 mls/hr IVPB Q12 HOME


   PRN Reason: Protocol


   Last Admin: 06/20/18 22:22 Dose:  167 mls/hr


Ceftriaxone Sodium (Rocephin 2 Gm Ivpb)  2 gm in 100 mls @ 100 mls/hr IVPB 

DAILY [facility]


   PRN Reason: Protocol


   Stop: 07/18/18 14:57


   Last Admin: 06/21/18 09:39 Dose:  100 mls/hr


Meloxicam (Mobic)  15 mg PO DAILY [facility]


   Last Admin: 06/21/18 09:37 Dose:  15 mg


Morphine Sulfate (Morphine)  2 mg IVP Q6H PRN


   PRN Reason: Pain, severe (8-10)


   Last Admin: 06/21/18 09:40 Dose:  2 mg


Pantoprazole Sodium (Protonix Ec Tab)  40 mg PO 0600 [facility]


   Last Admin: 06/21/18 06:09 Dose:  40 mg


Polyethylene Glycol (Miralax)  17 gm PO DAILY [facility]


   Last Admin: 06/21/18 09:38 Dose:  17 gm


Quetiapine Fumarate (Seroquel Xr)  800 mg PO HS HOME


   PRN Reason: Protocol


   Last Admin: 06/20/18 22:21 Dose:  800 mg


Tamsulosin HCl (Flomax)  0.4 mg PO DAILY [facility]


   Last Admin: 06/21/18 09:38 Dose:  0.4 mg


Zolpidem Tartrate (Ambien)  5 mg PO HS PRN; Protocol


   PRN Reason: Insomnia


   Last Admin: 06/21/18 02:13 Dose:  5 mg











- Labs


Labs: 


 





 06/21/18 06:00 





 06/21/18 06:00 





 











APTT  30.5 Seconds (25.1-36.5)   06/17/18  07:00    














- Constitutional


Appears: Well, Non-toxic, No Acute Distress





- Extremities Exam


Additional comments: 





Bilateral LE focused exam:





VASC: DP/PT pulses are palpable 2/4, Cap refill time: < 3 sec to all digits, 

Temp gradient: warm to cool from proximal to distal on the left and warm to 

warm on the right, non-pitting edema noted on the dorsum of the foot on the 

right





DERM: superficial epidermal layer lysis with dermal layer exposed on the dorsal 

aspect of the right 5th digit, erythema extending from the 5th digit proximally 

towards the ankle joint - improving, no mal odor, no active drainage, no 

purulence, no probe to bone





NEURO: Protective sensation grossly intact





ORTHO: pain on palpation of the wound on the right 5th digit, Prett test 

negative





- Neurological Exam


Neurological Exam: Alert, Awake, Oriented x3





- Psychiatric Exam


Psychiatric exam: Normal Affect, Normal Mood





Assessment and Plan





- Assessment and Plan (Free Text)


Assessment: 





47 year old male with extensive PMHx was evaluated for 1). right 5th digit 

ulcer 2). cellulitis


Plan: 





Patient seen and evaluated with attending Dr. Albright


Labs, vitals and charts reviewed 


Afebrile, WBC @ 8.7


Wound cleaned with saline and dressing applied using DSD


Bactroban ordered


MRI of the right foot - suspicious for OM, clinically less likely 


Cultures taken - pending


Continue IV abx as per ID - Vancomycin 


Local wound care


Will continue to monitor patient while in-house


[facility] 





<Linda Albright - Last Filed: 07/01/18 13:25>





Objective





- Vital Signs/Intake and Output


Vital Signs (last 24 hours): 


 











Temp Pulse Resp BP Pulse Ox


 


 98.0 F   90   20   139/98 H  97 


 


 06/27/18 06:00  06/27/18 06:00  06/27/18 06:00  06/27/18 06:00  06/27/18 06:00











- Labs


Labs: 


 





 06/26/18 06:20 





 06/26/18 06:20 





 











APTT  30.5 Seconds (25.1-36.5)   06/17/18  07:00    














Attending/Attestation





- Attestation


I have personally seen and examined this patient.: Yes


I have fully participated in the care of the patient.: Yes


I have reviewed all pertinent clinical information, including history, physical 

exam and plan: Yes

## 2018-06-21 NOTE — CP.PCM.PN
<Lou Diamond - Last Filed: 06/21/18 16:40>





Subjective





- Date & Time of Evaluation


Date of Evaluation: 06/21/18


Time of Evaluation: 12:26





- Subjective


Subjective: 





Internal Medicine Progress Note - Hospitalist Service





Patient seen and examined at bedside. Per nursing no acute events overnight. 

Patient is doing well, requesting medication for breakthrough pain. Podiatry is 

following. Patient is on IV vancomycin and IV rocephin. Denies headaches, 

dizziness, chest pain, palpitations, sob, abdominal pain, urinary symptoms, 

changes in bowel habits. 





Objective





- Vital Signs/Intake and Output


Vital Signs (last 24 hours): 


 











Temp Pulse Resp BP Pulse Ox


 


 97.2 F L  72   18   123/70   96 


 


 06/21/18 08:30  06/21/18 08:30  06/21/18 08:30  06/21/18 08:30  06/21/18 08:30








Intake and Output: 


 











 06/21/18 06/21/18





 06:59 18:59


 


Intake Total 1640 360


 


Output Total  350


 


Balance 1640 10














- Medications


Medications: 


 Current Medications





Acetaminophen (Tylenol 325mg Tab)  650 mg PO Q6H PRN


   PRN Reason: Pain, moderate (4-7)


Benztropine Mesylate (Cogentin)  1 mg PO AMHS [facility]


   Last Admin: 06/21/18 09:37 Dose:  1 mg


Clotrimazole (Lotrimin 1%)  0 gm TOP BID [facility]


   Last Admin: 06/21/18 09:39 Dose:  1 appl


Divalproex Sodium (Depakote Dr(*Bid*))  500 mg PO AMHS [facility]


   Last Admin: 06/21/18 09:37 Dose:  500 mg


Docusate Sodium (Colace)  100 mg PO TID [facility]


   Last Admin: 06/21/18 09:37 Dose:  100 mg


Fluoxetine HCl (Prozac)  40 mg PO DAILY [facility]


   Last Admin: 06/21/18 09:37 Dose:  40 mg


Gabapentin (Neurontin)  600 mg PO QID [facility]


   PRN Reason: Protocol


   Last Admin: 06/21/18 09:38 Dose:  600 mg


Heparin Sodium (Porcine) (Heparin)  5,000 units SC Q12 HOME


   PRN Reason: Protocol


   Last Admin: 06/21/18 09:38 Dose:  5,000 units


Vancomycin HCl (Vancomycin 1gm)  1 gm in 250 mls @ 167 mls/hr IVPB Q12 HOME


   PRN Reason: Protocol


   Last Admin: 06/21/18 11:19 Dose:  167 mls/hr


Ceftriaxone Sodium (Rocephin 2 Gm Ivpb)  2 gm in 100 mls @ 100 mls/hr IVPB 

DAILY [facility]


   PRN Reason: Protocol


   Stop: 07/18/18 14:57


   Last Admin: 06/21/18 09:39 Dose:  100 mls/hr


Morphine Sulfate (Morphine)  2 mg IVP Q6H PRN


   PRN Reason: Pain, severe (8-10)


   Last Admin: 06/21/18 09:40 Dose:  2 mg


Mupirocin (Bactroban Ointment)  10 gm TOP DAILY [facility]


Naproxen (Anaprox Ds)  550 mg PO BID HOME


Pantoprazole Sodium (Protonix Ec Tab)  40 mg PO 0600 [facility]


   Last Admin: 06/21/18 06:09 Dose:  40 mg


Polyethylene Glycol (Miralax)  17 gm PO DAILY [facility]


   Last Admin: 06/21/18 09:38 Dose:  17 gm


Quetiapine Fumarate (Seroquel Xr)  800 mg PO HS HOME


   PRN Reason: Protocol


   Last Admin: 06/20/18 22:21 Dose:  800 mg


Tamsulosin HCl (Flomax)  0.4 mg PO DAILY [facility]


   Last Admin: 06/21/18 09:38 Dose:  0.4 mg


Zolpidem Tartrate (Ambien)  5 mg PO HS PRN; Protocol


   PRN Reason: Insomnia


   Last Admin: 06/21/18 02:13 Dose:  5 mg











- Labs


Labs: 


 





 06/21/18 06:00 





 06/21/18 06:00 





 











APTT  30.5 Seconds (25.1-36.5)   06/17/18  07:00    














- Additional Findings


Additional findings: 





- Constitutional


Appears: No Acute Distress





- Head Exam


Head Exam: ATRAUMATIC, NORMAL INSPECTION, NORMOCEPHALIC





- Eye Exam


Eye Exam: EOMI, Normal appearance


Pupil Exam: NORMAL ACCOMODATION





- ENT Exam


ENT Exam: Mucous Membranes Moist; erythematous, flaky, non-pustular rash 

surrounding nasal bridge, cheeks, and chin





- Neck Exam


Neck Exam: Full ROM





- Respiratory Exam


Respiratory Exam: Clear to Ausculation Bilateral, NORMAL BREATHING PATTERN.  

absent: Rales, Rhonchi, Wheezes





- Cardiovascular Exam


Cardiovascular Exam: REGULAR RHYTHM, +S1, +S2





- GI/Abdominal Exam


GI & Abdominal Exam: Soft, Normal Bowel Sounds.  absent: Guarding, Rigid, 

Tenderness





- Extremities Exam


Additional comments: 





Right lower extremity: dressing intact





- Back Exam


Back Exam: NORMAL INSPECTION





- Neurological Exam


Neurological Exam: Alert, Awake, Oriented x3





- Psychiatric Exam


Psychiatric exam: Normal Affect, Normal Mood





- Skin


Skin Exam: Dry, Normal Color, Warm





Assessment and Plan





- Assessment and Plan (Free Text)


Assessment: 





A/P: Patient is a 48 yo M with PMH bipolar disorder, schizoaffective disorder, 

polysubstance abuse, and right leg compartment syndrome s/p fasciotomy, 

presents for right leg cellulitis 2/2 trauma, also found to have tachycardia:





Right 5th phalangeal Osteomyelitis/Right Lower extremity Cellulitis


-Stable, afebrile, no leukocytosis


-CRP elevated, ESR elevated


-X ray foot: soft tissue swelling without acute articular or osseous 

abnormality. No osteomyelitis


-MRI foot: soft tissue swelling of dorsum and 5th digit, edema is identified 

within the fused mid and distal 5th phalanges suspcious of osteomyelitis, mild 

soft tissue swelling of the 1st and 2nd digits also suspicious of osteomyelitis


-Antibiotics: Vancomycin 1.5gm Q12H, Rocephin 2gm daily (when clear for 

discharge, can D/C with IV Daptomycin)


-Vanco trough 7.5


-F/U wound culture


-Lower extremity US: normal VALORIE/PVR


-Pain control: Morphine 2mg Q6H prn pain (patient also receiving Mobic 15mg 

daily which is max dose; will switch to Naproxen 550mg BID tomorrow) 


-Colace 100mg PO TID and Miralax 17gm daily for constipation


-ID on consult, help appreciated


-Podiatry on consult, help appreciated


-Physical Therapy ordered


-PICC line ordered for long term IV antibiotics use





Tachycardia


-Currently asymptomatic


-HR 70-90s now


-Echocardiogram showed mild concentric left ventricular hypertrophy, EF 50%, 

trace MR and TR





Hx of BPH: 


-Continue flomax





Hx of Mood disorder, schizoaffective disorder 


-Continue Seroquel and Fluoxetine


-Continue Depakote and Cogentin


-Depakote level 50


-Psych on consult, help appreciated


-Patient complaining about slurred speech, no focal/neurological deficits; will 

see if psych meds can be adjusted





Hx of Neuropathy: 


-Continue with Neurontin 600mg QID


-Continue Mobic 15mg PO daily





Insomnia


-Ambien PRN





History of substance abuse


-UTOX on admission negative


-Cessation advised





DVT/GI prophylaxis: 


-Heparin 5000 Q12 SC 


-Protonix 40mg PO daily





DISPO: Patient will follow up with PMD, Dr Hanson upon discharge. Discussed 

case with Podiatry, surgery unlikely at this time. Patient requires long term 

IV antibiotics. Case management following. Plan discussed with Dr Precious Diamond DO PGY-1





<Cathy Lang - Last Filed: 06/21/18 17:40>





Objective





- Vital Signs/Intake and Output


Vital Signs (last 24 hours): 


 











Temp Pulse Resp BP Pulse Ox


 


 97.2 F L  72   18   123/70   96 


 


 06/21/18 08:30  06/21/18 08:30  06/21/18 08:30  06/21/18 08:30  06/21/18 08:30








Intake and Output: 


 











 06/21/18 06/21/18





 06:59 18:59


 


Intake Total 1640 360


 


Output Total  350


 


Balance 1640 10














- Medications


Medications: 


 Current Medications





Acetaminophen (Tylenol 325mg Tab)  650 mg PO Q6H PRN


   PRN Reason: Pain, moderate (4-7)


Benztropine Mesylate (Cogentin)  1 mg PO AMHS [facility]


   Last Admin: 06/21/18 09:37 Dose:  1 mg


Clotrimazole (Lotrimin 1%)  0 gm TOP BID [facility]


   Last Admin: 06/21/18 09:39 Dose:  1 appl


Divalproex Sodium (Depakote Dr(*Bid*))  500 mg PO AMHS [facility]


   Last Admin: 06/21/18 09:37 Dose:  500 mg


Docusate Sodium (Colace)  100 mg PO TID [facility]


   Last Admin: 06/21/18 16:00 Dose:  100 mg


Fluoxetine HCl (Prozac)  40 mg PO DAILY [facility]


   Last Admin: 06/21/18 09:37 Dose:  40 mg


Gabapentin (Neurontin)  600 mg PO QID [facility]


   PRN Reason: Protocol


   Last Admin: 06/21/18 16:00 Dose:  600 mg


Heparin Sodium (Porcine) (Heparin)  5,000 units SC Q12 [facility]


   PRN Reason: Protocol


   Last Admin: 06/21/18 09:38 Dose:  5,000 units


Ceftriaxone Sodium (Rocephin 2 Gm Ivpb)  2 gm in 100 mls @ 100 mls/hr IVPB 

DAILY [facility]


   PRN Reason: Protocol


   Stop: 07/18/18 14:57


   Last Admin: 06/21/18 09:39 Dose:  100 mls/hr


Vancomycin HCl 1.5 gm/ Sodium (Chloride)  500 mls @ 167 mls/hr IVPB Q12H HOME


   PRN Reason: Protocol


Morphine Sulfate (Morphine)  2 mg IVP Q6H PRN


   PRN Reason: Pain, severe (8-10)


   Last Admin: 06/21/18 16:00 Dose:  2 mg


Mupirocin (Bactroban Ointment)  10 gm TOP DAILY [facility]


Naproxen (Anaprox Ds)  550 mg PO BID HOME


Pantoprazole Sodium (Protonix Ec Tab)  40 mg PO 0600 [facility]


   Last Admin: 06/21/18 06:09 Dose:  40 mg


Polyethylene Glycol (Miralax)  17 gm PO DAILY [facility]


   Last Admin: 06/21/18 09:38 Dose:  17 gm


Quetiapine Fumarate (Seroquel Xr)  800 mg PO HS HOME


   PRN Reason: Protocol


   Last Admin: 06/20/18 22:21 Dose:  800 mg


Tamsulosin HCl (Flomax)  0.4 mg PO DAILY [facility]


   Last Admin: 06/21/18 09:38 Dose:  0.4 mg


Zolpidem Tartrate (Ambien)  5 mg PO HS PRN; Protocol


   PRN Reason: Insomnia


   Last Admin: 06/21/18 02:13 Dose:  5 mg











- Labs


Labs: 


 





 06/21/18 06:00 





 06/21/18 06:00 





 











APTT  30.5 Seconds (25.1-36.5)   06/17/18  07:00    














Attending/Attestation





- Attestation


I have personally seen and examined this patient.: Yes


I have fully participated in the care of the patient.: Yes


I have reviewed all pertinent clinical information, including history, physical 

exam and plan: Yes


Notes (Text): 





06/21/18 17:39


47 year old male with past medical history of bipolar, schizoaffective disorder

, polysubstance abuse, and right leg compartment syndrome s/p fasciotomy 

presented with right leg cellulitis.  MRI was reviewed as above suggestive of 

osteomyelitis.  Continue with iv antibiotics as per ID.  May need 4-6 weeks of 

iv antibiotics.  He is on morphine prn, mobic and gabapentin for pain.  He was 

counselled on risks of continued substance abuse.





Cathy Lang MD


Hospitalist.

## 2018-06-22 LAB
BASOPHILS # BLD AUTO: 0.03 K/MM3 (ref 0–2)
BASOPHILS NFR BLD: 0.4 % (ref 0–3)
BUN SERPL-MCNC: 9 MG/DL (ref 7–21)
CALCIUM SERPL-MCNC: 9 MG/DL (ref 8.4–10.5)
EOSINOPHIL # BLD: 0.3 10*3/UL (ref 0–0.7)
EOSINOPHIL NFR BLD: 3.3 % (ref 1.5–5)
ERYTHROCYTE [DISTWIDTH] IN BLOOD BY AUTOMATED COUNT: 13.1 % (ref 11.5–14.5)
GFR NON-AFRICAN AMERICAN: > 60
GRANULOCYTES # BLD: 5.13 10*3/UL (ref 1.4–6.5)
GRANULOCYTES NFR BLD: 64.4 % (ref 50–68)
HGB BLD-MCNC: 13.8 G/DL (ref 14–18)
LYMPHOCYTES # BLD: 2.1 10*3/UL (ref 1.2–3.4)
LYMPHOCYTES NFR BLD AUTO: 26.6 % (ref 22–35)
MCH RBC QN AUTO: 30.3 PG (ref 25–35)
MCHC RBC AUTO-ENTMCNC: 34.6 G/DL (ref 31–37)
MCV RBC AUTO: 87.7 FL (ref 80–105)
MONOCYTES # BLD AUTO: 0.4 10*3/UL (ref 0.1–0.6)
MONOCYTES NFR BLD: 5.3 % (ref 1–6)
PLATELET # BLD: 138 10^3/UL (ref 120–450)
PMV BLD AUTO: 10.9 FL (ref 7–11)
RBC # BLD AUTO: 4.55 10^6/UL (ref 3.5–6.1)
WBC # BLD AUTO: 8 10^3/UL (ref 4.5–11)

## 2018-06-22 PROCEDURE — 02HV33Z INSERTION OF INFUSION DEVICE INTO SUPERIOR VENA CAVA, PERCUTANEOUS APPROACH: ICD-10-PCS | Performed by: INTERNAL MEDICINE

## 2018-06-22 PROCEDURE — B548ZZA ULTRASONOGRAPHY OF SUPERIOR VENA CAVA, GUIDANCE: ICD-10-PCS | Performed by: INTERNAL MEDICINE

## 2018-06-22 RX ADMIN — NAPROXEN SODIUM SCH MG: 550 TABLET ORAL at 18:06

## 2018-06-22 RX ADMIN — POLYETHYLENE GLYCOL 3350 SCH: 17 POWDER, FOR SOLUTION ORAL at 11:07

## 2018-06-22 RX ADMIN — PANTOPRAZOLE SODIUM SCH MG: 40 TABLET, DELAYED RELEASE ORAL at 06:10

## 2018-06-22 RX ADMIN — QUETIAPINE SCH MG: 300 TABLET, EXTENDED RELEASE ORAL at 18:06

## 2018-06-22 RX ADMIN — CLOTRIMAZOLE SCH APPL: 1 CREAM TOPICAL at 18:16

## 2018-06-22 RX ADMIN — DIVALPROEX SODIUM SCH MG: 500 TABLET, DELAYED RELEASE ORAL at 10:42

## 2018-06-22 RX ADMIN — POLYETHYLENE GLYCOL 3350 SCH GM: 17 POWDER, FOR SOLUTION ORAL at 10:44

## 2018-06-22 RX ADMIN — NAPROXEN SODIUM SCH MG: 550 TABLET ORAL at 10:41

## 2018-06-22 RX ADMIN — CLOTRIMAZOLE SCH APPL: 1 CREAM TOPICAL at 11:05

## 2018-06-22 RX ADMIN — MORPHINE SULFATE PRN MG: 2 INJECTION, SOLUTION INTRAMUSCULAR; INTRAVENOUS at 12:10

## 2018-06-22 RX ADMIN — DIVALPROEX SODIUM SCH MG: 500 TABLET, DELAYED RELEASE ORAL at 22:10

## 2018-06-22 RX ADMIN — MORPHINE SULFATE PRN MG: 2 INJECTION, SOLUTION INTRAMUSCULAR; INTRAVENOUS at 06:17

## 2018-06-22 RX ADMIN — CEFTRIAXONE SCH MLS/HR: 2 INJECTION, POWDER, FOR SOLUTION INTRAMUSCULAR; INTRAVENOUS at 10:49

## 2018-06-22 RX ADMIN — MUPIROCIN SCH GM: 20 OINTMENT TOPICAL at 11:04

## 2018-06-22 NOTE — CP.PCM.PN
Subjective





- Date & Time of Evaluation


Date of Evaluation: 06/22/18


Time of Evaluation: 10:05





- Subjective


Subjective: 





No fevers, not in distress.





Objective





- Vital Signs/Intake and Output


Vital Signs (last 24 hours): 


 











Temp Pulse Resp BP Pulse Ox


 


 97.8 F   108 H  20   123/90   98 


 


 06/21/18 18:00  06/21/18 18:00  06/21/18 18:00  06/21/18 18:00  06/21/18 18:00








Intake and Output: 


 











 06/22/18 06/22/18





 06:59 18:59


 


Intake Total 1620 


 


Output Total 800 


 


Balance 820 














- Medications


Medications: 


 Current Medications





Acetaminophen (Tylenol 325mg Tab)  650 mg PO Q6H PRN


   PRN Reason: Pain, moderate (4-7)


Benz[facility] Mes[facility] (Cogentin)  [facility]


   Last Admin: 06/21/18 22:42 Dose:  1 mg


Clotrimazole (Lotrimin 1%)  0 gm TOP BID [facility]


   Last Admin: 06/21/18 17:55 Dose:  1 appl


Divalproex Sodium (Depakote Dr(*Bid*))  500 mg PO [facility]


   Last Admin: 06/21/18 22:40 Dose:  500 mg


Docusate Sodium (Colace)  100 mg PO TID [facility]


   Last Admin: 06/21/18 17:54 Dose:  100 mg


Fluoxetine HCl (Prozac)  40 mg PO DAILY [facility]


   Last Admin: 06/21/18 09:37 Dose:  40 mg


Gabapentin (Neurontin)  600 mg PO QID [facility]


   PRN Reason: Protocol


   Last Admin: 06/21/18 22:40 Dose:  600 mg


Heparin Sodium (Porcine) (Heparin)  5,000 units SC Q12 [facility]


   PRN Reason: Protocol


   Last Admin: 06/21/18 22:43 Dose:  5,000 units


Ceftriaxone Sodium (Rocephin 2 Gm Ivpb)  2 gm in 100 mls @ 100 mls/hr IVPB 

DAILY [facility]


   PRN Reason: Protocol


   Stop: 07/18/18 14:57


   Last Admin: 06/21/18 09:39 Dose:  100 mls/hr


Vancomycin HCl 1.5 gm/ Sodium (Chloride)  500 mls @ 167 mls/hr IVPB Q12H [facility]


   PRN Reason: Protocol


   Last Admin: 06/21/18 22:38 Dose:  167 mls/hr


Morphine Sulfate (Morphine)  2 mg IVP Q6H PRN


   PRN Reason: Pain, severe (8-10)


   Last Admin: 06/22/18 06:17 Dose:  2 mg


Mupirocin (Bactroban Ointment)  10 gm TOP DAILY [facility]


Naproxen (Anaprox Ds)  550 mg PO BID [facility]


Pantoprazole Sodium (Protonix Ec Tab)  40 mg PO 0600 [facility]


   Last Admin: 06/22/18 06:10 Dose:  40 mg


Polyethylene Glycol (Miralax)  17 gm PO DAILY [facility]


   Last Admin: 06/21/18 09:38 Dose:  17 gm


Quetiapine Fumarate 600 mg/ (Quetiapine Fumarate 100 mg)  700 mg PO 1700 [facility]


Tamsulosin HCl (Flomax)  0.4 mg PO DAILY [facility]


   Last Admin: 06/21/18 09:38 Dose:  0.4 mg











- Labs


Labs: 


 





 06/22/18 06:30 





 06/22/18 06:30 





 











APTT  30.5 Seconds (25.1-36.5)   06/17/18  07:00    














- Constitutional


Appears: Chronically Ill





- Head Exam


Head Exam: NORMAL INSPECTION





- Neck Exam


Neck Exam: absent: Meningismus





- Respiratory Exam


Respiratory Exam: Decreased Breath Sounds





- Cardiovascular Exam


Cardiovascular Exam: +S1, +S2





- GI/Abdominal Exam


GI & Abdominal Exam: Soft.  absent: Tenderness





Assessment and Plan





- Assessment and Plan (Free Text)


Plan: 





Assessment


Right foot cellulitis with right 5th toe osteomyelitis


schizoaffective disorder


bipolar disorder


anxiety and depression


history of right leg fasciotomy





Plan


Continue Vancomycin and Rocephin to complete 4-6 weeks of antibiotics with 

weekly Vanco trough level, CBC, CMP, ESR, CRP

## 2018-06-22 NOTE — CP.PCM.PN
<Sparkle Straks - Last Filed: 06/22/18 15:31>





Subjective





- Date & Time of Evaluation


Date of Evaluation: 06/22/18


Time of Evaluation: 15:31





- Subjective


Subjective: 





Sparkle Starks, PGY1, Medicine Progress Note for Dr Lang:





Patient seen and examined at bedside. No acute events overnight. States that 

his pain is better controlled. Denies fever, chills, nausea, vomiting, cp, sob, 

abdominal pain.





Objective





- Vital Signs/Intake and Output


Vital Signs (last 24 hours): 


 











Temp Pulse Resp BP Pulse Ox


 


 97.8 F   108 H  20   123/90   98 


 


 06/21/18 18:00  06/21/18 18:00  06/21/18 18:00  06/21/18 18:00  06/21/18 18:00








Intake and Output: 


 











 06/22/18 06/22/18





 06:59 18:59


 


Intake Total 1620 


 


Output Total 800 


 


Balance 820 














- Medications


Medications: 


 Current Medications





Acetaminophen (Tylenol 325mg Tab)  650 mg PO Q6H PRN


   PRN Reason: Pain, moderate (4-7)


Benztropine Mesylate (Cogentin)  1 mg PO AMHS [facility]


   Last Admin: 06/22/18 10:42 Dose:  1 mg


Clotrimazole (Lotrimin 1%)  0 gm TOP BID [facility]


   Last Admin: 06/22/18 11:05 Dose:  1 appl


Divalproex Sodium (Depakote Dr(*Bid*))  500 mg PO AMHS [facility]


   Last Admin: 06/22/18 10:42 Dose:  500 mg


Docusate Sodium (Colace)  100 mg PO TID [facility]


   Last Admin: 06/22/18 14:30 Dose:  100 mg


Fluoxetine HCl (Prozac)  40 mg PO DAILY [facility]


   Last Admin: 06/22/18 10:41 Dose:  40 mg


Gabapentin (Neurontin)  600 mg PO QID [facility]


   PRN Reason: Protocol


   Last Admin: 06/22/18 14:30 Dose:  600 mg


Heparin Sodium (Porcine) (Heparin)  5,000 units SC Q12 [facility]


   PRN Reason: Protocol


   Last Admin: 06/22/18 10:43 Dose:  5,000 units


Ceftriaxone Sodium (Rocephin 2 Gm Ivpb)  2 gm in 100 mls @ 100 mls/hr IVPB 

DAILY [facility]


   PRN Reason: Protocol


   Stop: 07/18/18 14:57


   Last Admin: 06/22/18 10:49 Dose:  100 mls/hr


Vancomycin HCl 1.5 gm/ Sodium (Chloride)  500 mls @ 167 mls/hr IVPB Q12H HOME


   PRN Reason: Protocol


   Last Admin: 06/22/18 12:09 Dose:  167 mls/hr


Morphine Sulfate (Morphine)  2 mg IVP Q6H PRN


   PRN Reason: Pain, severe (8-10)


   Last Admin: 06/22/18 12:10 Dose:  2 mg


Mupirocin (Bactroban Ointment)  10 gm TOP DAILY [facility]


   Last Admin: 06/22/18 11:04 Dose:  10 gm


Naproxen (Anaprox Ds)  550 mg PO BID [facility]


   Last Admin: 06/22/18 10:41 Dose:  550 mg


Pantoprazole Sodium (Protonix Ec Tab)  40 mg PO 0600 [facility]


   Last Admin: 06/22/18 06:10 Dose:  40 mg


Polyethylene Glycol (Miralax)  17 gm PO DAILY [facility]


   Last Admin: 06/22/18 11:07 Dose:  Not Given


Quetiapine Fumarate 600 mg/ (Quetiapine Fumarate 100 mg)  700 mg PO 1700 [facility]


Tamsulosin HCl (Flomax)  0.4 mg PO DAILY [facility]


   Last Admin: 06/22/18 10:41 Dose:  0.4 mg











- Labs


Labs: 


 





 06/22/18 06:30 





 06/22/18 06:30 





 











APTT  30.5 Seconds (25.1-36.5)   06/17/18  07:00    














- Additional Findings


Additional findings: 





- Constitutional


Appears: No Acute Distress





- Head Exam


Head Exam: ATRAUMATIC, NORMAL INSPECTION, NORMOCEPHALIC





- Eye Exam


Eye Exam: EOMI, Normal appearance


Pupil Exam: NORMAL ACCOMODATION





- ENT Exam


ENT Exam: Mucous Membranes Moist; erythematous, flaky, non-pustular rash 

surrounding nasal bridge, cheeks, and chin





- Neck Exam


Neck Exam: Full ROM





- Respiratory Exam


Respiratory Exam: Clear to Ausculation Bilateral, NORMAL BREATHING PATTERN.  

absent: Rales, Rhonchi, Wheezes





- Cardiovascular Exam


Cardiovascular Exam: REGULAR RHYTHM, +S1, +S2





- GI/Abdominal Exam


GI & Abdominal Exam: Soft, Normal Bowel Sounds.  absent: Guarding, Rigid, 

Tenderness





- Extremities Exam


Additional comments: 





Right lower extremity: dressing intact





- Back Exam


Back Exam: NORMAL INSPECTION





- Neurological Exam


Neurological Exam: Alert, Awake, Oriented x3





- Psychiatric Exam


Psychiatric exam: Normal Affect, Normal Mood





- Skin


Skin Exam: Dry, Normal Color, Warm





Assessment and Plan





- Assessment and Plan (Free Text)


Assessment: 





46 yo M with PMH bipolar disorder, schizoaffective disorder, polysubstance abuse

, and right leg compartment syndrome s/p fasciotomy, presents for right leg 

cellulitis 2/2 trauma, also found to have tachycardia:





Right 5th phalangeal Osteomyelitis/Right Lower extremity Cellulitis:


-Stable, afebrile, no leukocytosis


-CRP elevated, ESR elevated


-X ray foot: soft tissue swelling without acute articular or osseous 

abnormality. No osteomyelitis


-MRI foot: soft tissue swelling of dorsum and 5th digit, edema is identified 

within the fused mid and distal 5th phalanges suspcious of osteomyelitis, mild 

soft tissue swelling of the 1st and 2nd digits also suspicious of osteomyelitis


-Antibiotics: Vancomycin 1.5gm Q12H, Rocephin 2gm daily (when clear for 

discharge, can D/C with IV Daptomycin)


-Vanco trough 7.5, readjusted Vanco as per ID


-wound culture shows light growth of coag neg staph


-Lower extremity US: normal VALORIE/PVR


-Pain control: tramadol, naproxen


-Colace 100mg PO TID and Miralax 17gm daily for constipation


-ID on consult, help appreciated


-Podiatry on consult, help appreciated


-Physical Therapy: recommends home


-PICC line inserted in right arm for long term IV antibiotics use





Tachycardia


-Currently asymptomatic


-HR 70s-108 now


-Echocardiogram showed mild concentric left ventricular hypertrophy, EF 45%, 

trace MR and TR





Hx of BPH: 


-Continue flomax





Hx of Mood disorder, schizoaffective disorder 


-Continue Seroquel and Fluoxetine


-Continue Depakote and Cogentin


-Depakote level 50


-Psych on consult, help appreciated


-Patient complaining about slurred speech, no focal/neurological deficits; will 

see if psych meds can be adjusted





Hx of Neuropathy: 


-Continue with Neurontin 600mg QID


-Continue Mobic 15mg PO daily





Insomnia


-Ambien PRN





History of substance abuse


-UTOX on admission negative


-Cessation advised





DVT/GI prophylaxis: 


-Heparin 5000 Q12 [facility] 


-Protonix 40mg PO daily





DISPO: Patient will follow up with PMD, Dr Hanson upon discharge. Discussed 

case with Podiatry, surgery unlikely at this time. Patient requires long term 

IV antibiotics. Case management following, awaiting to hear back from [facility]. Plan discussed with Dr Lang.





Sparkle Starks, PGY1





<Cathy Lang - Last Filed: 06/22/18 17:30>





Objective





- Vital Signs/Intake and Output


Vital Signs (last 24 hours): 


 











Temp Pulse Resp BP Pulse Ox


 


 97.8 F   108 H  20   123/90   98 


 


 06/21/18 18:00  06/21/18 18:00  06/21/18 18:00  06/21/18 18:00  06/21/18 18:00








Intake and Output: 


 











 06/22/18 06/22/18





 06:59 18:59


 


Intake Total 1620 


 


Output Total 800 


 


Balance 820 














- Medications


Medications: 


 Current Medications





Acetaminophen (Tylenol 325mg Tab)  650 mg PO Q6H PRN


   PRN Reason: Pain, Mild (1-3)


Benztropine Mesylate (Cogentin)  1 mg PO AMHS [facility]


   Last Admin: 06/22/18 10:42 Dose:  1 mg


Clotrimazole (Lotrimin 1%)  0 gm TOP BID [facility]


   Last Admin: 06/22/18 11:05 Dose:  1 appl


Divalproex Sodium (Depakote Dr(*Bid*))  500 mg PO AMHS [facility]


   Last Admin: 06/22/18 10:42 Dose:  500 mg


Docusate Sodium (Colace)  100 mg PO TID [facility]


   Last Admin: 06/22/18 14:30 Dose:  100 mg


Fluoxetine HCl (Prozac)  40 mg PO DAILY [facility]


   Last Admin: 06/22/18 10:41 Dose:  40 mg


Gabapentin (Neurontin)  600 mg PO QID [facility]


   PRN Reason: Protocol


   Last Admin: 06/22/18 14:30 Dose:  600 mg


Heparin Sodium (Porcine) (Heparin)  5,000 units SC Q12 [facility]


   PRN Reason: Protocol


   Last Admin: 06/22/18 10:43 Dose:  5,000 units


Ceftriaxone Sodium (Rocephin 2 Gm Ivpb)  2 gm in 100 mls @ 100 mls/hr IVPB 

DAILY [facility]


   PRN Reason: Protocol


   Stop: 07/18/18 14:57


   Last Admin: 06/22/18 10:49 Dose:  100 mls/hr


Vancomycin HCl 1.5 gm/ Sodium (Chloride)  500 mls @ 167 mls/hr IVPB Q12H [facility]


   PRN Reason: Protocol


   Last Admin: 06/22/18 12:09 Dose:  167 mls/hr


Mupirocin (Bactroban Ointment)  10 gm TOP DAILY [facility]


   Last Admin: 06/22/18 11:04 Dose:  10 gm


Naproxen (Anaprox Ds)  550 mg PO BID [facility]


   Last Admin: 06/22/18 10:41 Dose:  550 mg


Pantoprazole Sodium (Protonix Ec Tab)  40 mg PO 0600 [facility]


   Last Admin: 06/22/18 06:10 Dose:  40 mg


Polyethylene Glycol (Miralax)  17 gm PO DAILY [facility]


   Last Admin: 06/22/18 11:07 Dose:  Not Given


Quetiapine Fumarate 600 mg/ (Quetiapine Fumarate 100 mg)  700 mg PO 1700 [facility]


Tamsulosin HCl (Flomax)  0.4 mg PO DAILY [facility]


   Last Admin: 06/22/18 10:41 Dose:  0.4 mg


Tramadol HCl (Ultram)  50 mg PO Q8 PRN


   PRN Reason: Pain, moderate (4-7)


   Last Admin: 06/22/18 16:08 Dose:  50 mg











- Labs


Labs: 


 





 06/22/18 06:30 





 06/22/18 06:30 





 











APTT  30.5 Seconds (25.1-36.5)   06/17/18  07:00    














Attending/Attestation





- Attestation


I have personally seen and examined this patient.: Yes


I have fully participated in the care of the patient.: Yes


I have reviewed all pertinent clinical information, including history, physical 

exam and plan: Yes


Notes (Text): 





06/22/18 17:29


47 year old male with past medical history of bipolar, schizoaffective disorder

, polysubstance abuse, and right leg compartment syndrome s/p fasciotomy 

presented with right leg cellulitis.  MRI was reviewed as above suggestive of 

osteomyelitis.  Continue with iv antibiotics as per ID.  May need 4-6 weeks of 

iv antibiotics.  He is s/p picc line.  He is on tramadol prn, naproxen and 

gabapentin for pain.  He was counselled on risks of continued substance abuse.  

Psychiatry follow up was appreciated for medication adjustment.





Cathy Lang MD


Hospitalist.

## 2018-06-22 NOTE — CON
DATE:  06/22/2018



HISTORY OF PRESENT ILLNESS:  Patient is a 47-year-old  male with

long and debilitating history of polysubstance abuse and dependence,

reported diagnosis of schizoaffective disorder, bipolar type and multiple

psychiatric admissions.  He is actually being followed by Psychiatry on the

medical floor, to adjust his medications as he is being treated with IV

antibiotics.  Patient generally has been in good control and has been

organized, pleasant, cooperative, and coherent throughout the course,

though with complaints of some slurring, which was possibly secondary to

psychiatric medication.  I reviewed Dr. Corey's notes and apparently,

she changed the Seroquel to extended release to address the patient's

slurring; however, patient still complains of the side effect when I meet

him at bedside.  In general, she does report that he is doing well, mood is

pretty hopeful and he denies having any hallucinations and he does not

appear to be responding to internal stimuli.  Responses are goal directed,

relevant, and consistent.  Slurring is notable and the patient indicates

that he feels sedated and _____.  Generally, feels that his mood is stable

and his anxiety is under control and non-distressful.  He denies any new

discomfort or pain at this time and does not appear to be in any acute

distress.  Insight and judgment are fair.



Vital signs are reviewed and lab work results are reviewed by this

provider.



RELEVANT PSYCHIATRIC MEDICATIONS:  Include Cogentin 1 mg a.m. and at

bedtime, Depakote 500 mg a.m. and at bedtime, Prozac 40 mg daily, Seroquel

 mg p.o. at bedtime, Ambien 5 mg p.o. at bedtime p.r.n.



IMPRESSION:  As per history, bipolar disorder with schizoaffective bipolar

disorder, history of polysubstance abuse and dependence, rule out

substance-induced psychosis, appeared to be contributory to the patient's

presentation at this time.  Directly, it is contributing only to the

patient's presentation at this time.  Patient remains relatively stable.



RECOMMENDATIONS:  I discussed the patient's medication with him at bedside

and their purpose, and indicated that Seroquel will be changed to 700 mg at

05:00 p.m. and Ambien will also be discontinued.  Psychiatry will follow up

with him in the a.m. to assess his tolerance to medications and to transfer

if there are indications for further medication adjustment.  Specifically,

Dr. Lopez will follow up with the patient on 06/23/2018.





__________________________________________

Devendra Lopez MD





DD:  06/22/2018 7:35:54

DT:  06/22/2018 10:26:23

[facility] # [id number]

## 2018-06-22 NOTE — PN
DATE:  06/21/2018



SUBJECTIVE:  Patient is in no acute distress.



PHYSICAL EXAMINATION:

VITAL SIGNS:  Temperature is 97, blood pressure is 120/70, respiratory rate

18, heart rate 72.

HEENT:  Unremarkable.

NECK:  Supple.

LUNGS:  Decreased breath sounds.

HEART:  Normal S1 and S2.

ABDOMEN:  Soft.



LABORATORY EXAMINATION:  Reveals a white count of 8.7, hemoglobin of 13,

platelets of 158.  BUN of 12, creatinine of 0.7.  Urinalysis is noted. 

Microbiology reveals the stool cultures are pending and nasal MRSA is

negative.



ASSESSMENT AND PLAN:  This is a 47-year-old male, who was seen earlier

today in [facility], bed 1, who has a right foot cellulitis and osteomyelitis by

MRI in a patient with schizoaffective disorder, bipolar, anxiety.  MRI

consistent with osteomyelitis and elevated sedimentation rate and

C-reactive protein, currently on vancomycin and ceftriaxone.  He will need

4 to 6 weeks of antibiotics with weekly CBC, SMA-18, sedimentation rate,

C-reactive protein.  Patient's vancomycin trough level was 7.4 today and

vancomycin is increased _____ 1.5 g.  Should repeat the vancomycin trough

level in 24 to 48 hours.  We will follow with you.







__________________________________________

Jason Lopez MD



DD:  06/21/2018 22:43:43

DT:  06/22/2018 0:35:48

Job # [id number]

## 2018-06-22 NOTE — CP.PCM.PN
<Senia Bourne - Last Filed: 06/22/18 11:47>





Subjective





- Date & Time of Evaluation


Date of Evaluation: 06/22/18


Time of Evaluation: 11:47





- Subjective


Subjective: 





Podiatry Consult note: Dr. Albright/Dr. Hernandez





47 year old male patient was seen and evaluated for right 5th digit wound. 

Patient is AAOx3 and appears in NAD. Denies of any acute overnight events. No 

recent F/N/V/C/SOB/CP/headache. No other pedal complains. 





Objective





- Vital Signs/Intake and Output


Vital Signs (last 24 hours): 


 











Temp Pulse Resp BP Pulse Ox


 


 97.8 F   108 H  20   123/90   98 


 


 06/21/18 18:00  06/21/18 18:00  06/21/18 18:00  06/21/18 18:00  06/21/18 18:00








Intake and Output: 


 











 06/22/18 06/22/18





 06:59 18:59


 


Intake Total 1620 


 


Output Total 800 


 


Balance 820 














- Medications


Medications: 


 Current Medications





Acetaminophen (Tylenol 325mg Tab)  650 mg PO Q6H PRN


   PRN Reason: Pain, moderate (4-7)


Benztropine Mesylate (Cogentin)  1 mg PO AMHS [facility]


   Last Admin: 06/22/18 10:42 Dose:  1 mg


Clotrimazole (Lotrimin 1%)  0 gm TOP BID [facility]


   Last Admin: 06/22/18 11:05 Dose:  1 appl


Divalproex Sodium (Depakote Dr(*Bid*))  500 mg PO AMHS [facility]


   Last Admin: 06/22/18 10:42 Dose:  500 mg


Docusate Sodium (Colace)  100 mg PO TID [facility]


   Last Admin: 06/22/18 10:42 Dose:  100 mg


Fluoxetine HCl (Prozac)  40 mg PO DAILY [facility]


   Last Admin: 06/22/18 10:41 Dose:  40 mg


Gabapentin (Neurontin)  600 mg PO QID [facility]


   PRN Reason: Protocol


   Last Admin: 06/22/18 10:41 Dose:  600 mg


Heparin Sodium (Porcine) (Heparin)  5,000 units SC Q12 [facility]


   PRN Reason: Protocol


   Last Admin: 06/22/18 10:43 Dose:  5,000 units


Ceftriaxone Sodium (Rocephin 2 Gm Ivpb)  2 gm in 100 mls @ 100 mls/hr IVPB 

DAILY [facility]


   PRN Reason: Protocol


   Stop: 07/18/18 14:57


   Last Admin: 06/22/18 10:49 Dose:  100 mls/hr


Vancomycin HCl 1.5 gm/ Sodium (Chloride)  500 mls @ 167 mls/hr IVPB Q12H HOME


   PRN Reason: Protocol


   Last Admin: 06/21/18 22:38 Dose:  167 mls/hr


Morphine Sulfate (Morphine)  2 mg IVP Q6H PRN


   PRN Reason: Pain, severe (8-10)


   Last Admin: 06/22/18 06:17 Dose:  2 mg


Mupirocin (Bactroban Ointment)  10 gm TOP DAILY [facility]


   Last Admin: 06/22/18 11:04 Dose:  10 gm


Naproxen (Anaprox Ds)  550 mg PO BID [facility]


   Last Admin: 06/22/18 10:41 Dose:  550 mg


Pantoprazole Sodium (Protonix Ec Tab)  40 mg PO 0600 [facility]


   Last Admin: 06/22/18 06:10 Dose:  40 mg


Polyethylene Glycol (Miralax)  17 gm PO DAILY [facility]


   Last Admin: 06/22/18 11:07 Dose:  Not Given


Quetiapine Fumarate 600 mg/ (Quetiapine Fumarate 100 mg)  700 mg PO 1700 [facility]


Tamsulosin HCl (Flomax)  0.4 mg PO DAILY [facility]


   Last Admin: 06/22/18 10:41 Dose:  0.4 mg











- Labs


Labs: 


 





 06/22/18 06:30 





 06/22/18 06:30 





 











APTT  30.5 Seconds (25.1-36.5)   06/17/18  07:00    














- Constitutional


Appears: Well, Non-toxic, No Acute Distress





- Extremities Exam


Additional comments: 





Bilateral LE focused exam:





VASC: DP/PT pulses are palpable 2/4, Cap refill time: < 3 sec to all digits, 

Temp gradient: warm to cool from proximal to distal on the left and warm to 

warm on the right, non-pitting edema noted on the dorsum of the foot on the 

right





DERM: superficial epidermal layer lysis with dermal layer exposed on the dorsal 

aspect of the right 5th digit, erythema extending from the 5th digit proximally 

towards the ankle joint - improving since yesterday, no mal odor, no active 

drainage, no purulence, no probe to bone





NEURO: Protective sensation grossly intact





ORTHO: pain on palpation of the wound on the right 5th digit, Prett test 

negative





- Neurological Exam


Neurological Exam: Alert, Awake, Oriented x3





- Psychiatric Exam


Psychiatric exam: Normal Affect, Normal Mood





Assessment and Plan





- Assessment and Plan (Free Text)


Assessment: 





47 year old male with extensive PMHx was evaluated for 1). right 5th digit 

ulcer 2). cellulitis


Plan: 





Patient seen and evaluated


Discussed plan with attending Dr. Hernandez


Labs, vitals and charts reviewed 


Afebrile, WBC @ 8.0


Wound cleaned with saline and dressing applied using bactroban, DSD


MRI of the right foot - suspicious for OM, clinically less likely 


Cultures taken - Coag negative staph


Continue IV abx as per ID - Vancomycin 


Local wound care


Will continue to monitor patient while in-house


Upon discharge, follow up with Dr. Albright at wound care center 





<Kevin Hernandez - Last Filed: 06/22/18 17:57>





Objective





- Vital Signs/Intake and Output


Vital Signs (last 24 hours): 


 











Temp Pulse Resp BP Pulse Ox


 


 97.8 F   108 H  20   123/90   98 


 


 06/21/18 18:00  06/21/18 18:00  06/21/18 18:00  06/21/18 18:00  06/21/18 18:00








Intake and Output: 


 











 06/22/18 06/22/18





 06:59 18:59


 


Intake Total 1620 


 


Output Total 800 


 


Balance 820 














- Medications


Medications: 


 Current Medications





Acetaminophen (Tylenol 325mg Tab)  650 mg PO Q6H PRN


   PRN Reason: Pain, Mild (1-3)


Benztropine Mesylate (Cogentin)  [facility]


   Last Admin: 06/22/18 10:42 Dose:  1 mg


Clotrimazole (Lotrimin 1%)  0 gm TOP BID [facility]


   Last Admin: 06/22/18 11:05 Dose:  1 appl


Divalproex Sodium (Depakote Dr(*Bid*))  500 mg PO [facility]


   Last Admin: 06/22/18 10:42 Dose:  500 mg


Docusate Sodium (Colace)  100 mg PO TID [facility]


   Last Admin: 06/22/18 14:30 Dose:  100 mg


Fluoxetine HCl (Prozac)  40 mg PO DAILY [facility]


   Last Admin: 06/22/18 10:41 Dose:  40 mg


Gabapentin (Neurontin)  600 mg PO QID [facility]


   PRN Reason: Protocol


   Last Admin: 06/22/18 14:30 Dose:  600 mg


Heparin Sodium (Porcine) (Heparin)  5,000 units SC Q12 [facility]


   PRN Reason: Protocol


   Last Admin: 06/22/18 10:43 Dose:  5,000 units


Ceftriaxone Sodium (Rocephin 2 Gm Ivpb)  2 gm in 100 mls @ 100 mls/hr IVPB 

DAILY [facility]


   PRN Reason: Protocol


   Stop: 07/18/18 14:57


   Last Admin: 06/22/18 10:49 Dose:  100 mls/hr


Vancomycin HCl 1.5 gm/ Sodium (Chloride)  500 mls @ 167 mls/hr IVPB Q12H [facility]


   PRN Reason: Protocol


   Last Admin: 06/22/18 12:09 Dose:  167 mls/hr


Mupirocin (Bactroban Ointment)  10 gm TOP DAILY [facility]


   Last Admin: 06/22/18 11:04 Dose:  10 gm


Naproxen (Anaprox Ds)  550 mg PO BID [facility]


   Last Admin: 06/22/18 10:41 Dose:  550 mg


Pantoprazole Sodium (Protonix Ec Tab)  40 mg PO 0600 [facility]


   Last Admin: 06/22/18 06:10 Dose:  40 mg


Polyethylene Glycol (Miralax)  17 gm PO DAILY [facility]


   Last Admin: 06/22/18 11:07 Dose:  Not Given


Quetiapine Fumarate 600 mg/ (Quetiapine Fumarate 100 mg)  700 mg PO 1700 [facility]


Tamsulosin HCl (Flomax)  0.4 mg PO DAILY [facility]


   Last Admin: 06/22/18 10:41 Dose:  0.4 mg


Tramadol HCl (Ultram)  50 mg PO Q8 PRN


   PRN Reason: Pain, moderate (4-7)


   Last Admin: 06/22/18 16:08 Dose:  50 mg











- Labs


Labs: 


 





 06/22/18 06:30 





 06/22/18 06:30 





 











APTT  30.5 Seconds (25.1-36.5)   06/17/18  07:00    














Attending/Attestation





- Attestation


I have personally seen and examined this patient.: Yes


I have fully participated in the care of the patient.: Yes


I have reviewed all pertinent clinical information, including history, physical 

exam and plan: Yes

## 2018-06-23 LAB
BASOPHILS # BLD AUTO: 0.04 K/MM3 (ref 0–2)
BASOPHILS NFR BLD: 0.4 % (ref 0–3)
BUN SERPL-MCNC: 10 MG/DL (ref 7–21)
CALCIUM SERPL-MCNC: 8.2 MG/DL (ref 8.4–10.5)
EOSINOPHIL # BLD: 0.2 10*3/UL (ref 0–0.7)
EOSINOPHIL NFR BLD: 2.4 % (ref 1.5–5)
ERYTHROCYTE [DISTWIDTH] IN BLOOD BY AUTOMATED COUNT: 13.4 % (ref 11.5–14.5)
GFR NON-AFRICAN AMERICAN: > 60
GRANULOCYTES # BLD: 6.25 10*3/UL (ref 1.4–6.5)
GRANULOCYTES NFR BLD: 64.6 % (ref 50–68)
HGB BLD-MCNC: 12.6 G/DL (ref 14–18)
LYMPHOCYTES # BLD: 2.5 10*3/UL (ref 1.2–3.4)
LYMPHOCYTES NFR BLD AUTO: 26.2 % (ref 22–35)
MCH RBC QN AUTO: 30 PG (ref 25–35)
MCHC RBC AUTO-ENTMCNC: 33.3 G/DL (ref 31–37)
MCV RBC AUTO: 90 FL (ref 80–105)
MONOCYTES # BLD AUTO: 0.6 10*3/UL (ref 0.1–0.6)
MONOCYTES NFR BLD: 6.4 % (ref 1–6)
PLATELET # BLD: 137 10^3/UL (ref 120–450)
PMV BLD AUTO: 11.9 FL (ref 7–11)
RBC # BLD AUTO: 4.2 10^6/UL (ref 3.5–6.1)
WBC # BLD AUTO: 9.7 10^3/UL (ref 4.5–11)

## 2018-06-23 RX ADMIN — POLYETHYLENE GLYCOL 3350 SCH GM: 17 POWDER, FOR SOLUTION ORAL at 09:19

## 2018-06-23 RX ADMIN — MUPIROCIN SCH GM: 20 OINTMENT TOPICAL at 09:14

## 2018-06-23 RX ADMIN — CLOTRIMAZOLE SCH APPL: 1 CREAM TOPICAL at 10:09

## 2018-06-23 RX ADMIN — QUETIAPINE SCH MG: 300 TABLET, EXTENDED RELEASE ORAL at 17:46

## 2018-06-23 RX ADMIN — CEFTRIAXONE SCH MLS/HR: 2 INJECTION, POWDER, FOR SOLUTION INTRAMUSCULAR; INTRAVENOUS at 09:15

## 2018-06-23 RX ADMIN — DIVALPROEX SODIUM SCH MG: 500 TABLET, DELAYED RELEASE ORAL at 21:06

## 2018-06-23 RX ADMIN — OXYCODONE HYDROCHLORIDE AND ACETAMINOPHEN PRN TAB: 5; 325 TABLET ORAL at 15:52

## 2018-06-23 RX ADMIN — OXYCODONE HYDROCHLORIDE AND ACETAMINOPHEN PRN TAB: 5; 325 TABLET ORAL at 10:06

## 2018-06-23 RX ADMIN — NAPROXEN SODIUM SCH MG: 550 TABLET ORAL at 09:14

## 2018-06-23 RX ADMIN — DIVALPROEX SODIUM SCH MG: 500 TABLET, DELAYED RELEASE ORAL at 09:15

## 2018-06-23 RX ADMIN — OXYCODONE HYDROCHLORIDE AND ACETAMINOPHEN PRN TAB: 5; 325 TABLET ORAL at 21:06

## 2018-06-23 RX ADMIN — CLOTRIMAZOLE SCH APPL: 1 CREAM TOPICAL at 17:45

## 2018-06-23 RX ADMIN — PANTOPRAZOLE SODIUM SCH MG: 40 TABLET, DELAYED RELEASE ORAL at 05:33

## 2018-06-23 RX ADMIN — NAPROXEN SODIUM SCH MG: 550 TABLET ORAL at 17:44

## 2018-06-23 NOTE — CP.PCM.PN
<Jose Juan Dempsey - Last Filed: 06/23/18 07:52>





Subjective





- Date & Time of Evaluation


Date of Evaluation: 06/23/18


Time of Evaluation: 07:52





- Subjective


Subjective: 





Podiatry Consult note: Dr. Albright/Dr. Hernandez





47 year old male patient was seen and evaluated for right 5th digit wound. 

Patient is AAOx3 and appears in NAD. Patient resting in bed. No recent F/N/V/C/

SOB/CP/headache. No other pedal complains. 








Objective





- Vital Signs/Intake and Output


Vital Signs (last 24 hours): 


 











Temp Pulse Resp BP Pulse Ox


 


 97.2 F L  101 H  20   143/82   96 


 


 06/22/18 18:00  06/22/18 18:00  06/22/18 18:00  06/22/18 18:00  06/22/18 18:00








Intake and Output: 


 











 06/23/18 06/23/18





 06:59 18:59


 


Intake Total 790 240


 


Balance 790 240














- Medications


Medications: 


 Current Medications





Acetaminophen (Tylenol 325mg Tab)  650 mg PO Q6H PRN


   PRN Reason: Pain, Mild (1-3)


Benztropine Mesylate (Cogentin)  1 mg PO [facility]


   Last Admin: 06/22/18 22:10 Dose:  1 mg


Clotrimazole (Lotrimin 1%)  0 gm TOP BID [facility]


   Last Admin: 06/22/18 18:16 Dose:  1 appl


Divalproex Sodium (Depakote Dr(*Bid*))  500 mg PO [facility]


   Last Admin: 06/22/18 22:10 Dose:  500 mg


Docusate Sodium (Colace)  100 mg PO TID [facility]


   Last Admin: 06/22/18 17:58 Dose:  100 mg


Fluoxetine HCl (Prozac)  40 mg PO DAILY [facility]


   Last Admin: 06/22/18 10:41 Dose:  40 mg


Gabapentin (Neurontin)  600 mg PO QID [facility]


   PRN Reason: Protocol


   Last Admin: 06/22/18 22:10 Dose:  600 mg


Heparin Sodium (Porcine) (Heparin)  5,000 units SC Q12 HOME


   PRN Reason: Protocol


   Last Admin: 06/22/18 22:10 Dose:  5,000 units


Ceftriaxone Sodium (Rocephin 2 Gm Ivpb)  2 gm in 100 mls @ 100 mls/hr IVPB 

DAILY [facility]


   PRN Reason: Protocol


   Stop: 07/18/18 14:57


   Last Admin: 06/22/18 10:49 Dose:  100 mls/hr


Vancomycin HCl 1.5 gm/ Sodium (Chloride)  500 mls @ 167 mls/hr IVPB Q12H [facility]


   PRN Reason: Protocol


   Last Admin: 06/22/18 22:11 Dose:  167 mls/hr


Mupirocin (Bactroban Ointment)  10 gm TOP DAILY [facility]


   Last Admin: 06/22/18 11:04 Dose:  10 gm


Naproxen (Anaprox Ds)  550 mg PO BID [facility]


   Last Admin: 06/22/18 18:06 Dose:  550 mg


Pantoprazole Sodium (Protonix Ec Tab)  40 mg PO 0600 [facility]


   Last Admin: 06/23/18 05:33 Dose:  40 mg


Polyethylene Glycol (Miralax)  17 gm PO DAILY [facility]


   Last Admin: 06/22/18 11:07 Dose:  Not Given


Quetiapine Fumarate 600 mg/ (Quetiapine Fumarate 100 mg)  700 mg PO 1700 [facility]


   Last Admin: 06/22/18 18:06 Dose:  700 mg


Tamsulosin HCl (Flomax)  0.4 mg PO DAILY [facility]


   Last Admin: 06/22/18 10:41 Dose:  0.4 mg


Tramadol HCl (Ultram)  50 mg PO Q8 PRN


   PRN Reason: Pain, moderate (4-7)


   Last Admin: 06/22/18 16:08 Dose:  50 mg











- Labs


Labs: 


 





 06/22/18 06:30 





 06/22/18 06:30 





 











APTT  30.5 Seconds (25.1-36.5)   06/17/18  07:00    














- Constitutional


Appears: Well, No Acute Distress





- Extremities Exam


Additional comments: 








Bilateral LE focused exam: Cellulitis to dorum of right foot appears to be 

resolving with decrease in size.





VASC: DP/PT pulses are palpable 2/4, Cap refill time: < 3 sec to all digits, 

Temp gradient: warm to cool from proximal to distal on the left and warm to 

warm on the right, non-pitting edema noted on the dorsum of the foot on the 

right





DERM: superficial epidermal layer lysis with dermal layer exposed on the dorsal 

aspect of the right 5th digit, erythema extending from the 5th digit proximally 

towards the ankle joint, no mal odor, no active drainage, no purulence, no 

probe to bone





NEURO: Protective sensation grossly intact





ORTHO: pain on palpation of the wound on the right 5th digit, Prett test 

negative





- Neurological Exam


Neurological Exam: Alert, Awake





- Psychiatric Exam


Psychiatric exam: Normal Affect, Normal Mood





Assessment and Plan





- Assessment and Plan (Free Text)


Assessment: 





47 year old male with extensive PMHx was evaluated for 1). right 5th digit 

ulcer 2). cellulitis


Plan: 





Patient seen and evaluated


Discussed plan with attending Dr. Hernandez


Labs, vitals and charts reviewed 


Afebrile, WBC @ 8.0





Wound cleaned with saline and dressing applied using bactroban, DSD


MRI of the right foot - suspicious for OM, clinically less likely 


Cultures taken - Coag negative staph


Continue IV abx as per ID - Vancomycin 


Local wound care


Will continue to monitor patient while in-house


Upon discharge, follow up with Dr. Albright at wound care center 








<Kevin Hernandez - Last Filed: 06/23/18 08:18>





Objective





- Vital Signs/Intake and Output


Vital Signs (last 24 hours): 


 











Temp Pulse Resp BP Pulse Ox


 


 97.4 F L  85   19   139/88   97 


 


 06/23/18 06:00  06/23/18 06:00  06/23/18 06:00  06/23/18 06:00  06/23/18 06:00








Intake and Output: 


 











 06/23/18 06/23/18





 06:59 18:59


 


Intake Total 790 240


 


Balance 790 240














- Medications


Medications: 


 Current Medications





Acetaminophen (Tylenol 325mg Tab)  650 mg PO Q6H PRN


   PRN Reason: Pain, Mild (1-3)


Benztropine Mesylate (Cogentin)  1 mg PO [facility]


   Last Admin: 06/22/18 22:10 Dose:  1 mg


Clotrimazole (Lotrimin 1%)  0 gm TOP BID [facility]


   Last Admin: 06/22/18 18:16 Dose:  1 appl


Divalproex Sodium (Depakote Dr(*Bid*))  500 mg PO [facility]


   Last Admin: 06/22/18 22:10 Dose:  500 mg


Docusate Sodium (Colace)  100 mg PO TID [facility]


   Last Admin: 06/22/18 17:58 Dose:  100 mg


Fluoxetine HCl (Prozac)  40 mg PO DAILY [facility]


   Last Admin: 06/22/18 10:41 Dose:  40 mg


Gabapentin (Neurontin)  600 mg PO QID [facility]


   PRN Reason: Protocol


   Last Admin: 06/22/18 22:10 Dose:  600 mg


Heparin Sodium (Porcine) (Heparin)  5,000 units SC Q12 HOME


   PRN Reason: Protocol


   Last Admin: 06/22/18 22:10 Dose:  5,000 units


Ceftriaxone Sodium (Rocephin 2 Gm Ivpb)  2 gm in 100 mls @ 100 mls/hr IVPB 

DAILY [facility]


   PRN Reason: Protocol


   Stop: 07/18/18 14:57


   Last Admin: 06/22/18 10:49 Dose:  100 mls/hr


Vancomycin HCl 1.5 gm/ Sodium (Chloride)  500 mls @ 167 mls/hr IVPB Q12H [facility]


   PRN Reason: Protocol


   Last Admin: 06/22/18 22:11 Dose:  167 mls/hr


Mupirocin (Bactroban Ointment)  10 gm TOP DAILY [facility]


   Last Admin: 06/22/18 11:04 Dose:  10 gm


Naproxen (Anaprox Ds)  550 mg PO BID [facility]


   Last Admin: 06/22/18 18:06 Dose:  550 mg


Pantoprazole Sodium (Protonix Ec Tab)  40 mg PO 0600 [facility]


   Last Admin: 06/23/18 05:33 Dose:  40 mg


Polyethylene Glycol (Miralax)  17 gm PO DAILY [facility]


   Last Admin: 06/22/18 11:07 Dose:  Not Given


Quetiapine Fumarate 600 mg/ (Quetiapine Fumarate 100 mg)  700 mg PO 1700 [facility]


   Last Admin: 06/22/18 18:06 Dose:  700 mg


Tamsulosin HCl (Flomax)  0.4 mg PO DAILY [facility]


   Last Admin: 06/22/18 10:41 Dose:  0.4 mg


Tramadol HCl (Ultram)  50 mg PO Q8 PRN


   PRN Reason: Pain, moderate (4-7)


   Last Admin: 06/22/18 16:08 Dose:  50 mg











- Labs


Labs: 


 





 06/23/18 07:30 





 06/23/18 07:30 





 











APTT  30.5 Seconds (25.1-36.5)   06/17/18  07:00    














Attending/Attestation





- Attestation


I have personally seen and examined this patient.: Yes


I have fully participated in the care of the patient.: Yes


I have reviewed all pertinent clinical information, including history, physical 

exam and plan: Yes

## 2018-06-23 NOTE — CP.PCM.PN
Subjective





- Date & Time of Evaluation


Date of Evaluation: 06/23/18


Time of Evaluation: 10:25





- Subjective


Subjective: 





Comfortable in bed, no fevers, no lower extremity pain currently.





Objective





- Vital Signs/Intake and Output


Vital Signs (last 24 hours): 


 











Temp Pulse Resp BP Pulse Ox


 


 97.4 F L  85   19   139/88   97 


 


 06/23/18 06:00  06/23/18 06:00  06/23/18 06:00  06/23/18 06:00  06/23/18 06:00








Intake and Output: 


 











 06/23/18 06/23/18





 06:59 18:59


 


Intake Total 790 240


 


Balance 790 240














- Medications


Medications: 


 Current Medications





Acetaminophen (Tylenol 325mg Tab)  650 mg PO Q6H PRN


   PRN Reason: Pain, Mild (1-3)


Benztropine Mesylate (Cogentin)  1 mg PO [facility]


   Last Admin: 06/23/18 09:14 Dose:  1 mg


Clotrimazole (Lotrimin 1%)  0 gm TOP BID [facility]


   Last Admin: 06/22/18 18:16 Dose:  1 appl


Divalproex Sodium (Depakote Dr(*Bid*))  500 mg PO [facility]


   Last Admin: 06/23/18 09:15 Dose:  500 mg


Docusate Sodium (Colace)  100 mg PO TID [facility]


   Last Admin: 06/23/18 09:14 Dose:  100 mg


Fluoxetine HCl (Prozac)  40 mg PO DAILY [facility]


   Last Admin: 06/23/18 09:15 Dose:  40 mg


Gabapentin (Neurontin)  600 mg PO QID [facility]


   PRN Reason: Protocol


   Last Admin: 06/23/18 09:17 Dose:  600 mg


Heparin Sodium (Porcine) (Heparin)  5,000 units SC Q12 [facility]


   PRN Reason: Protocol


   Last Admin: 06/23/18 09:20 Dose:  5,000 units


Ceftriaxone Sodium (Rocephin 2 Gm Ivpb)  2 gm in 100 mls @ 100 mls/hr IVPB 

DAILY [facility]


   PRN Reason: Protocol


   Stop: 07/18/18 14:57


   Last Admin: 06/23/18 09:15 Dose:  100 mls/hr


Vancomycin HCl 1.5 gm/ Sodium (Chloride)  500 mls @ 167 mls/hr IVPB Q12H [facility]


   PRN Reason: Protocol


   Last Admin: 06/22/18 22:11 Dose:  167 mls/hr


Mupirocin (Bactroban Ointment)  10 gm TOP DAILY [facility]


   Last Admin: 06/23/18 09:14 Dose:  1 gm


Naproxen (Anaprox Ds)  550 mg PO BID [facility]


   Last Admin: 06/23/18 09:14 Dose:  550 mg


Oxycodone/Acetaminophen (Percocet 5/325 Mg Tab)  1 tab PO Q6H PRN


   PRN Reason: pain


   Stop: 06/26/18 09:16


Pantoprazole Sodium (Protonix Ec Tab)  40 mg PO 0600 [facility]


   Last Admin: 06/23/18 05:33 Dose:  40 mg


Polyethylene Glycol (Miralax)  17 gm PO DAILY [facility]


   Last Admin: 06/23/18 09:19 Dose:  17 gm


Quetiapine Fumarate 600 mg/ (Quetiapine Fumarate 100 mg)  700 mg PO 1700 [facility]


   Last Admin: 06/22/18 18:06 Dose:  700 mg


Tamsulosin HCl (Flomax)  0.4 mg PO DAILY [facility]


   Last Admin: 06/23/18 09:19 Dose:  0.4 mg











- Labs


Labs: 


 





 06/23/18 07:30 





 06/23/18 07:30 





 











APTT  30.5 Seconds (25.1-36.5)   06/17/18  07:00    














- Constitutional


Appears: Non-toxic, Chronically Ill





- Head Exam


Head Exam: NORMAL INSPECTION





- ENT Exam


ENT Exam: Mucous Membranes Moist





- Neck Exam


Neck Exam: absent: Meningismus





- Respiratory Exam


Respiratory Exam: Decreased Breath Sounds





- Cardiovascular Exam


Cardiovascular Exam: +S1, +S2





- GI/Abdominal Exam


GI & Abdominal Exam: Soft.  absent: Tenderness





Assessment and Plan





- Assessment and Plan (Free Text)


Plan: 





Assessment


Right foot cellulitis with right 5th toe osteomyelitis


schizoaffective disorder


bipolar disorder


anxiety and depression


history of right leg fasciotomy





Plan


Continue Vancomycin and Rocephin to complete 4-6 weeks of antibiotics with 

weekly Vanco trough level, CBC, CMP, ESR, CRP (day 7 today); Vanco trough today 

is still below target - will repeat Vanco trough tomorrow evening

## 2018-06-23 NOTE — CP.PCM.PN
<Airam Daugherty - Last Filed: 06/23/18 19:48>





Subjective





- Date & Time of Evaluation


Date of Evaluation: 06/23/18


Time of Evaluation: 09:16





- Subjective


Subjective: 


Airam Daugherty DO PGY-1: Hospitalist Service


Patient complained of foot pain. analgesic regiment switched. Upgraded to 

Percocet. Patient denies any other complaints. 








Objective





- Vital Signs/Intake and Output


Vital Signs (last 24 hours): 


 











Temp Pulse Resp BP Pulse Ox


 


 97.4 F L  85   19   139/88   97 


 


 06/23/18 06:00  06/23/18 06:00  06/23/18 06:00  06/23/18 06:00  06/23/18 06:00








Intake and Output: 


 











 06/23/18 06/23/18





 06:59 18:59


 


Intake Total 790 240


 


Balance 790 240














- Medications


Medications: 


 Current Medications





Acetaminophen (Tylenol 325mg Tab)  650 mg PO Q6H PRN


   PRN Reason: Pain, Mild (1-3)


Benztropine Mesylate (Cogentin)  [facility]


   Last Admin: 06/22/18 22:10 Dose:  1 mg


Clotrimazole (Lotrimin 1%)  0 gm TOP BID [facility]


   Last Admin: 06/22/18 18:16 Dose:  1 appl


Divalproex Sodium (Depakote Dr(*Bid*))  500 mg PO [facility]


   Last Admin: 06/22/18 22:10 Dose:  500 mg


Docusate Sodium (Colace)  100 mg PO TID [facility]


   Last Admin: 06/22/18 17:58 Dose:  100 mg


Fluoxetine HCl (Prozac)  40 mg PO DAILY [facility]


   Last Admin: 06/22/18 10:41 Dose:  40 mg


Gabapentin (Neurontin)  600 mg PO QID [facility]


   PRN Reason: Protocol


   Last Admin: 06/22/18 22:10 Dose:  600 mg


Heparin Sodium (Porcine) (Heparin)  5,000 units SC Q12 [facility]


   PRN Reason: Protocol


   Last Admin: 06/22/18 22:10 Dose:  5,000 units


Ceftriaxone Sodium (Rocephin 2 Gm Ivpb)  2 gm in 100 mls @ 100 mls/hr IVPB 

DAILY [facility]


   PRN Reason: Protocol


   Stop: 07/18/18 14:57


   Last Admin: 06/22/18 10:49 Dose:  100 mls/hr


Vancomycin HCl 1.5 gm/ Sodium (Chloride)  500 mls @ 167 mls/hr IVPB Q12H [facility]


   PRN Reason: Protocol


   Last Admin: 06/22/18 22:11 Dose:  167 mls/hr


Mupirocin (Bactroban Ointment)  10 gm TOP DAILY [facility]


   Last Admin: 06/22/18 11:04 Dose:  10 gm


Naproxen (Anaprox Ds)  550 mg PO BID [facility]


   Last Admin: 06/22/18 18:06 Dose:  550 mg


Oxycodone/Acetaminophen (Percocet 5/325 Mg Tab)  1 tab PO Q6H PRN


   PRN Reason: pain


   Stop: 06/26/18 09:16


Pantoprazole Sodium (Protonix Ec Tab)  40 mg PO 0600 [facility]


   Last Admin: 06/23/18 05:33 Dose:  40 mg


Polyethylene Glycol (Miralax)  17 gm PO DAILY [facility]


   Last Admin: 06/22/18 11:07 Dose:  Not Given


Quetiapine Fumarate 600 mg/ (Quetiapine Fumarate 100 mg)  700 mg PO 1700 [facility]


   Last Admin: 06/22/18 18:06 Dose:  700 mg


Tamsulosin HCl (Flomax)  0.4 mg PO DAILY [facility]


   Last Admin: 06/22/18 10:41 Dose:  0.4 mg











- Labs


Labs: 


 





 06/23/18 07:30 





 06/23/18 07:30 





 











APTT  30.5 Seconds (25.1-36.5)   06/17/18  07:00    














Assessment and Plan





- Assessment and Plan (Free Text)


Assessment: 


46 yo M with PMH bipolar disorder, schizoaffective disorder, polysubstance abuse

, and right leg compartment syndrome s/p fasciotomy, presents for right leg 

cellulitis 2/2 trauma, also found to have tachycardia:





Right 5th phalangeal Osteomyelitis/Right Lower extremity Cellulitis:


-Stable, afebrile, no leukocytosis


-CRP elevated, ESR elevated


-X ray foot: soft tissue swelling without acute articular or osseous 

abnormality. No osteomyelitis


-MRI foot: soft tissue swelling of dorsum and 5th digit, edema is identified 

within the fused mid and distal 5th phalanges suspcious of osteomyelitis, mild 

soft tissue swelling of the 1st and 2nd digits also suspicious of osteomyelitis


-Antibiotics: Vancomycin 1.5gm Q12H, Rocephin 2gm daily (when clear for 

discharge, can D/C with IV Daptomycin)


-Vanco trough 7.5, readjusted Vanco as per ID


-wound culture shows light growth of coag neg staph


-Lower extremity US: normal VALORIE/PVR


-Pain control: Percocet 5 mg q6h PRN


-Colace 100mg PO TID and Miralax 17gm daily for constipation


-ID on consult, help appreciated


-Podiatry on consult, help appreciated


-Physical Therapy: recommends home


-PICC line inserted in right arm for long term IV antibiotics use





Tachycardia


-Currently asymptomatic


-HR 70s-108 now


-Echocardiogram showed mild concentric left ventricular hypertrophy, EF 45%, 

trace MR and TR





Hx of BPH: 


-Continue flomax





Hx of Mood disorder, schizoaffective disorder 


-Continue Seroquel and Fluoxetine


-Continue Depakote and Cogentin


-Depakote level 50


-Psych on consult, help appreciated


-Patient complaining about slurred speech, no focal/neurological deficits; will 

see if psych meds can be adjusted





Hx of Neuropathy: 


-Continue with Neurontin 600mg QID


-Continue Mobic 15mg PO daily





Insomnia


-Ambien PRN





History of substance abuse


-UTOX on admission negative


-Cessation advised





DVT/GI prophylaxis: 


-Heparin 5000 Q12 SC 


-Protonix 40mg PO daily





DISPO: Patient will follow up with PMD, Dr Hanson upon discharge. Discussed 

case with Podiatry, surgery unlikely at this time. Patient requires long term 

IV antibiotics. [facility] management following[facility] Dr Lang.





<Cathy Lang - Last Filed: 06/24/18 07:35>





Objective





- Vital Signs/Intake and Output


Vital Signs (last 24 hours): 


 











Temp Pulse Resp BP Pulse Ox


 


 97.9 F   81   18   158/92 H  96 


 


 06/23/18 17:24  06/23/18 17:24  06/23/18 17:24  06/23/18 17:24  06/23/18 17:24








Intake and Output: 


 











 06/24/18 06/24/18





 06:59 18:59


 


Intake Total 600 


 


Output Total 0 


 


Balance 600 














- Medications


Medications: 


 Current Medications





Acetaminophen (Tylenol 325mg Tab)  650 mg PO Q6H PRN


   PRN Reason: Pain, Mild (1-3)


Benztropine Mesylate (Cogentin)  1 mg PO [facility]


   Last Admin: 06/23/18 21:05 Dose:  1 mg


Clotrimazole (Lotrimin 1%)  0 gm TOP BID [facility]


   Last Admin: 06/23/18 17:45 Dose:  1 appl


Divalproex Sodium (Depakote Dr(*Bid*))  500 mg PO [facility]


   Last Admin: 06/23/18 21:06 Dose:  500 mg


Docusate Sodium (Colace)  100 mg PO TID [facility]


   Last Admin: 06/23/18 17:44 Dose:  100 mg


Fluoxetine HCl (Prozac)  40 mg PO DAILY [facility]


   Last Admin: 06/23/18 09:15 Dose:  40 mg


Gabapentin (Neurontin)  600 mg PO QID [facility]


   PRN Reason: Protocol


   Last Admin: 06/23/18 21:06 Dose:  600 mg


Heparin Sodium (Porcine) (Heparin)  5,000 units SC Q12 [facility]


   PRN Reason: Protocol


   Last Admin: 06/23/18 21:06 Dose:  5,000 units


Ceftriaxone Sodium (Rocephin 2 Gm Ivpb)  2 gm in 100 mls @ 100 mls/hr IVPB 

DAILY [facility]


   PRN Reason: Protocol


   Stop: 07/18/18 14:57


   Last Admin: 06/23/18 09:15 Dose:  100 mls/hr


Vancomycin HCl 1.5 gm/ Sodium (Chloride)  500 mls @ 167 mls/hr IVPB Q12H [facility]


   PRN Reason: Protocol


   Last Admin: 06/23/18 21:03 Dose:  167 mls/hr


Mupirocin (Bactroban Ointment)  10 gm TOP DAILY [facility]


   Last Admin: 06/23/18 09:14 Dose:  1 gm


Naproxen (Anaprox Ds)  550 mg PO BID [facility]


   Last Admin: 06/23/18 17:44 Dose:  550 mg


Oxycodone/Acetaminophen (Percocet 5/325 Mg Tab)  1 tab PO Q6H PRN


   PRN Reason: pain


   Stop: 06/26/18 09:16


   Last Admin: 06/24/18 05:27 Dose:  1 tab


Pantoprazole Sodium (Protonix Ec Tab)  40 mg PO 0600 [facility]


   Last Admin: 06/24/18 05:27 Dose:  40 mg


Polyethylene Glycol (Miralax)  17 gm PO DAILY [facility]


   Last Admin: 06/23/18 09:19 Dose:  17 gm


Quetiapine Fumarate 600 mg/ (Quetiapine Fumarate 100 mg)  700 mg PO 1700 [facility]


   Last Admin: 06/23/18 17:46 Dose:  700 mg


Tamsulosin HCl (Flomax)  0.4 mg PO DAILY [facility]


   Last Admin: 06/23/18 09:19 Dose:  0.4 mg











- Labs


Labs: 


 





 06/24/18 06:00 





 06/24/18 06:00 





 











APTT  30.5 Seconds (25.1-36.5)   06/17/18  07:00    














Attending/Attestation





- Attestation


I have personally seen and examined this patient.: Yes


I have fully participated in the care of the patient.: Yes


I have reviewed all pertinent clinical information, including history, physical 

exam and plan: Yes


Notes (Text): 





06/23/18


47 year old male with past medical history of bipolar, schizoaffective disorder

, polysubstance abuse, and right leg compartment syndrome s/p fasciotomy 

presented with right leg cellulitis.  MRI was reviewed as above suggestive of 

osteomyelitis.  Continue with iv antibiotics as per ID.  May need 4-6 weeks of 

iv antibiotics.  He is s/p picc line.  He is on tramadol prn, naproxen and 

gabapentin for pain.  He is requested to switch tramadol to percocet prn.  He 

was counselled on risks of continued substance abuse.  Psychiatry follow up was 

appreciated for medication adjustment.





Cathy Lang MD


Hospitalist.

## 2018-06-24 LAB
BASOPHILS # BLD AUTO: 0.04 K/MM3 (ref 0–2)
BASOPHILS NFR BLD: 0.5 % (ref 0–3)
BUN SERPL-MCNC: 9 MG/DL (ref 7–21)
CALCIUM SERPL-MCNC: 8.6 MG/DL (ref 8.4–10.5)
EOSINOPHIL # BLD: 0.2 10*3/UL (ref 0–0.7)
EOSINOPHIL NFR BLD: 2.4 % (ref 1.5–5)
ERYTHROCYTE [DISTWIDTH] IN BLOOD BY AUTOMATED COUNT: 13.2 % (ref 11.5–14.5)
GFR NON-AFRICAN AMERICAN: > 60
GRANULOCYTES # BLD: 4.61 10*3/UL (ref 1.4–6.5)
GRANULOCYTES NFR BLD: 59.4 % (ref 50–68)
HGB BLD-MCNC: 13 G/DL (ref 14–18)
LYMPHOCYTES # BLD: 2.5 10*3/UL (ref 1.2–3.4)
LYMPHOCYTES NFR BLD AUTO: 31.9 % (ref 22–35)
MCH RBC QN AUTO: 29.9 PG (ref 25–35)
MCHC RBC AUTO-ENTMCNC: 33.7 G/DL (ref 31–37)
MCV RBC AUTO: 88.7 FL (ref 80–105)
MONOCYTES # BLD AUTO: 0.5 10*3/UL (ref 0.1–0.6)
MONOCYTES NFR BLD: 5.8 % (ref 1–6)
PLATELET # BLD: 132 10^3/UL (ref 120–450)
PMV BLD AUTO: 11.6 FL (ref 7–11)
RBC # BLD AUTO: 4.35 10^6/UL (ref 3.5–6.1)
WBC # BLD AUTO: 7.8 10^3/UL (ref 4.5–11)

## 2018-06-24 RX ADMIN — CEFTRIAXONE SCH MLS/HR: 2 INJECTION, POWDER, FOR SOLUTION INTRAMUSCULAR; INTRAVENOUS at 10:07

## 2018-06-24 RX ADMIN — OXYCODONE HYDROCHLORIDE AND ACETAMINOPHEN PRN TAB: 5; 325 TABLET ORAL at 22:13

## 2018-06-24 RX ADMIN — OXYCODONE HYDROCHLORIDE AND ACETAMINOPHEN PRN TAB: 5; 325 TABLET ORAL at 17:09

## 2018-06-24 RX ADMIN — OXYCODONE HYDROCHLORIDE AND ACETAMINOPHEN PRN TAB: 5; 325 TABLET ORAL at 05:27

## 2018-06-24 RX ADMIN — QUETIAPINE SCH MG: 300 TABLET, EXTENDED RELEASE ORAL at 17:08

## 2018-06-24 RX ADMIN — OXYCODONE HYDROCHLORIDE AND ACETAMINOPHEN PRN TAB: 5; 325 TABLET ORAL at 11:03

## 2018-06-24 RX ADMIN — MUPIROCIN SCH GM: 20 OINTMENT TOPICAL at 10:06

## 2018-06-24 RX ADMIN — DIVALPROEX SODIUM SCH MG: 500 TABLET, DELAYED RELEASE ORAL at 10:06

## 2018-06-24 RX ADMIN — PANTOPRAZOLE SODIUM SCH MG: 40 TABLET, DELAYED RELEASE ORAL at 05:27

## 2018-06-24 RX ADMIN — CLOTRIMAZOLE SCH APPL: 1 CREAM TOPICAL at 10:08

## 2018-06-24 RX ADMIN — NAPROXEN SODIUM SCH MG: 550 TABLET ORAL at 10:06

## 2018-06-24 RX ADMIN — NAPROXEN SODIUM SCH MG: 550 TABLET ORAL at 17:09

## 2018-06-24 RX ADMIN — POLYETHYLENE GLYCOL 3350 SCH GM: 17 POWDER, FOR SOLUTION ORAL at 10:06

## 2018-06-24 RX ADMIN — DIVALPROEX SODIUM SCH MG: 500 TABLET, DELAYED RELEASE ORAL at 22:11

## 2018-06-24 NOTE — CP.PCM.PN
<Airam Daugherty - Last Filed: 06/24/18 17:07>





Subjective





- Date & Time of Evaluation


Date of Evaluation: 06/24/18


Time of Evaluation: 08:00





- Subjective


Subjective: 


Patient reports good pain control with Percocet. NRNAEO. Afebrile, in no acute 

distress. He reports less twitching of the mouth. Psychiatry came and saw the 

patient as well.








Objective





- Vital Signs/Intake and Output


Vital Signs (last 24 hours): 


 











Temp Pulse Resp BP Pulse Ox


 


 97.9 F   94 H  19   135/81   96 


 


 06/24/18 16:28  06/24/18 16:28  06/24/18 16:28  06/24/18 16:28  06/24/18 16:28








Intake and Output: 


 











 06/24/18 06/24/18





 06:59 18:59


 


Intake Total 600 


 


Output Total 0 


 


Balance 600 














- Medications


Medications: 


 Current Medications





Acetaminophen (Tylenol 325mg Tab)  650 mg PO Q6H PRN


   PRN Reason: Pain, Mild (1-3)


Benztropine Mesylate (Cogentin)  1 mg PO AMHS [facility]


   Last Admin: 06/24/18 10:06 Dose:  1 mg


Clotrimazole (Lotrimin 1%)  0 gm TOP BID [facility]


   Last Admin: 06/24/18 10:08 Dose:  1 appl


Divalproex Sodium (Dep[facility]ote Dr(*Bid*))  [facility]


   Last Admin: 06/24/18 10:06 Dose:  500 mg


Docusate Sodium (Colace)  100 mg PO TID [facility]


   Last Admin: 06/24/18 13:21 Dose:  100 mg


Fluoxetine HCl (Prozac)  40 mg PO DAILY [facility]


   Last Admin: 06/24/18 10:06 Dose:  40 mg


Gabapentin (Neurontin)  600 mg PO QID [facility]


   PRN Reason: Protocol


   Last Admin: 06/24/18 13:21 Dose:  600 mg


Heparin Sodium (Porcine) (Heparin)  5,000 units SC Q12 [facility]


   PRN Reason: Protocol


   Last Admin: 06/24/18 10:06 Dose:  5,000 units


Ceftriaxone Sodium (Rocephin 2 Gm Ivpb)  2 gm in 100 mls @ 100 mls/hr IVPB 

DAILY [facility]


   PRN Reason: Protocol


   Stop: 07/18/18 14:57


   Last Admin: 06/24/18 10:07 Dose:  100 mls/hr


Vancomycin HCl 1.5 gm/ Sodium (Chloride)  500 mls @ 167 mls/hr IVPB Q12H [facility]


   PRN Reason: Protocol


   Last Admin: 06/24/18 11:02 Dose:  167 mls/hr


Mupirocin (Bactroban Ointment)  10 gm TOP DAILY [facility]


   Last Admin: 06/24/18 10:06 Dose:  1 gm


Naproxen (Anaprox Ds)  550 mg PO BID [facility]


   Last Admin: 06/24/18 10:06 Dose:  550 mg


Oxycodone/Acetaminophen (Percocet 5/325 Mg Tab)  1 tab PO Q6H PRN


   PRN Reason: pain


   Stop: 06/26/18 09:16


   Last Admin: 06/24/18 11:03 Dose:  1 tab


Pantoprazole Sodium (Protonix Ec Tab)  40 mg PO 0600 [facility]


   Last Admin: 06/24/18 05:27 Dose:  40 mg


Polyethylene Glycol (Miralax)  17 gm PO DAILY [facility]


   Last Admin: 06/24/18 10:06 Dose:  17 gm


Quetiapine Fumarate 600 mg/ (Quetiapine Fumarate 100 mg)  700 mg PO 1700 [facility]


   Last Admin: 06/23/18 17:46 Dose:  700 mg


Tamsulosin HCl (Flomax)  0.4 mg PO DAILY [facility]


   Last Admin: 06/24/18 10:06 Dose:  0.4 mg











- Labs


Labs: 


 





 06/24/18 06:00 





 06/24/18 06:00 





 











APTT  30.5 Seconds (25.1-36.5)   06/17/18  07:00    














- Constitutional


Appears: Well, Non-toxic





- Head Exam


Head Exam: ATRAUMATIC, NORMOCEPHALIC





- Eye Exam


Eye Exam: EOMI, Normal appearance





- ENT Exam


ENT Exam: Mucous Membranes Moist





- Neck Exam


Neck Exam: Normal Inspection





- Respiratory Exam


Respiratory Exam: Clear to Ausculation Bilateral, NORMAL BREATHING PATTERN.  

absent: Accessory Muscle Use





- Cardiovascular Exam


Cardiovascular Exam: RRR, +S1, +S2





- GI/Abdominal Exam


GI & Abdominal Exam: Soft, Normal Bowel Sounds





- Extremities Exam


Additional comments: 





right leg is thin and frail compared to left. right foot wrapped, dressing dry, 

clean, and intact.





- Neurological Exam


Neurological Exam: Alert, Awake, Oriented x3





- Psychiatric Exam


Psychiatric exam: Normal Affect, Normal Mood





- Skin


Skin Exam: Dry, Intact, Normal Color, Warm





Assessment and Plan





- Assessment and Plan (Free Text)


Assessment: 


48 yo M with PMH bipolar disorder, schizoaffective disorder, polysubstance abuse

, and right leg compartment syndrome s/p fasciotomy, presents for right leg 

cellulitis 2/2 trauma, also found to have tachycardia:





Right 5th phalangeal Osteomyelitis/Right Lower extremity Cellulitis:


-Stable, afebrile, no leukocytosis


-CRP elevated, ESR elevated


-X ray foot: soft tissue swelling without acute articular or osseous 

abnormality. No osteomyelitis


-MRI foot: soft tissue swelling of dorsum and 5th digit, edema is identified 

within the fused mid and distal 5th phalanges suspcious of osteomyelitis, mild 

soft tissue swelling of the 1st and 2nd digits also suspicious of osteomyelitis


-Antibiotics: Vancomycin 1.5gm Q12H, Rocephin 2gm daily (when clear for 

discharge, can D/C with IV Daptomycin)


-Vanco trough 7.5, readjusted Vanco as per ID


-wound culture shows light growth of coag neg staph


-Lower extremity US: normal VALORIE/PVR


-Pain control: Percocet 5 mg q6h PRN


-Colace 100mg PO TID and Miralax 17gm daily for constipation


-ID on consult, help appreciated


-Podiatry on consult, help appreciated


-Physical Therapy: recommends home


-PICC line inserted in right arm for long term IV antibiotics use





Tachycardia


-Currently asymptomatic


-HR 70s-108 now


-Echocardiogram showed mild concentric left ventricular hypertrophy, EF 45%, 

trace MR and TR





Face rash 


- Clotrimazole to be applied daily





Hx of BPH: 


-Continue flomax





Hx of Mood disorder, schizoaffective disorder 


-Continue Seroquel and Fluoxetine


-Continue Depakote and Cogentin


-Depakote level 50


-Psych on consult, help appreciated


-Patient complaining about slurred speech, no focal/neurological deficits; will 

see if psych meds can be adjusted





Hx of Neuropathy: 


-Continue with Neurontin 600mg QID


-Continue Mobic 15mg PO daily





Insomnia


-Ambien PRN





History of substance abuse


-UTOX on admission negative


-Cessation advised





DVT/GI prophylaxis: 


-Heparin 5000 Q12 SC 


-Protonix 40mg PO daily





DISPO: Patient will follow up with PMD, Dr Hanson upon discharge. Discussed 

case with Podiatry, surgery unlikely at this time. Patient requires long term 

IV antibiotics. Case management following, awaiting to hear back from [facility]. Plan discussed with Dr Lang








<Cathy Lang - Last Filed: 06/24/18 18:17>





Objective





- Vital Signs/Intake and Output


Vital Signs (last 24 hours): 


 











Temp Pulse Resp BP Pulse Ox


 


 97.9 F   94 H  19   135/81   96 


 


 06/24/18 16:28  06/24/18 16:28  06/24/18 16:28  06/24/18 16:28  06/24/18 16:28








Intake and Output: 


 











 06/24/18 06/24/18





 06:59 18:59


 


Intake Total 600 


 


Output Total 0 


 


Balance 600 














- Medications


Medications: 


 Current Medications





Acetaminophen (Tylenol 325mg Tab)  650 mg PO Q6H PRN


   PRN Reason: Pain, Mild (1-3)


Benztropine Mesylate (Cogentin)  1 mg PO [facility]


   Last Admin: 06/24/18 10:06 Dose:  1 mg


Clotrimazole (Lotrimin 1%)  0 gm TOP BID [facility]


   Last Admin: 06/24/18 10:08 Dose:  1 appl


Divalproex Sodium (Depakote Dr(*Bid*))  500 mg PO [facility]


   Last Admin: 06/24/18 10:06 Dose:  500 mg


Docusate Sodium (Colace)  100 mg PO TID [facility]


   Last Admin: 06/24/18 17:09 Dose:  100 mg


Fluoxetine HCl (Prozac)  40 mg PO DAILY [facility]


   Last Admin: 06/24/18 10:06 Dose:  40 mg


Gabapentin (Neurontin)  600 mg PO QID [facility]


   PRN Reason: Protocol


   Last Admin: 06/24/18 17:08 Dose:  600 mg


Heparin Sodium (Porcine) (Heparin)  5,000 units SC Q12 HOME


   PRN Reason: Protocol


   Last Admin: 06/24/18 10:06 Dose:  5,000 units


Ceftriaxone Sodium (Rocephin 2 Gm Ivpb)  2 gm in 100 mls @ 100 mls/hr IVPB 

DAILY [facility]


   PRN Reason: Protocol


   Stop: 07/18/18 14:57


   Last Admin: 06/24/18 10:07 Dose:  100 mls/hr


Vancomycin HCl 1.5 gm/ Sodium (Chloride)  500 mls @ 167 mls/hr IVPB Q12H [facility]


   PRN Reason: Protocol


   Last Admin: 06/24/18 11:02 Dose:  167 mls/hr


Mupirocin (Bactroban Ointment)  10 gm TOP DAILY [facility]


   Last Admin: 06/24/18 10:06 Dose:  1 gm


Naproxen (Anaprox Ds)  550 mg PO BID [facility]


   Last Admin: 06/24/18 17:09 Dose:  550 mg


Oxycodone/Acetaminophen (Percocet 5/325 Mg Tab)  1 tab PO Q6H PRN


   PRN Reason: pain


   Stop: 06/26/18 09:16


   Last Admin: 06/24/18 17:09 Dose:  1 tab


Pantoprazole Sodium (Protonix Ec Tab)  40 mg PO 0600 [facility]


   Last Admin: 06/24/18 05:27 Dose:  40 mg


Polyethylene Glycol (Miralax)  17 gm PO DAILY [facility]


   Last Admin: 06/24/18 10:06 Dose:  17 gm


Quetiapine Fumarate 600 mg/ (Quetiapine Fumarate 100 mg)  700 mg PO 1700 [facility]


   Last Admin: 06/24/18 17:08 Dose:  700 mg


Tamsulosin HCl (Flomax)  0.4 mg PO DAILY [facility]


   Last Admin: 06/24/18 10:06 Dose:  0.4 mg











- Labs


Labs: 


 





 06/24/18 06:00 





 06/24/18 06:00 





 











APTT  30.5 Seconds (25.1-36.5)   06/17/18  07:00    














Attending/Attestation





- Attestation


I have personally seen and examined this patient.: Yes


I have fully participated in the care of the patient.: Yes


I have reviewed all pertinent clinical information, including history, physical 

exam and plan: Yes


Notes (Text): 





06/24/18 18:14


47 year old male with past medical history of bipolar, schizoaffective disorder

, polysubstance abuse, and right leg compartment syndrome s/p fasciotomy 

presented with right leg cellulitis.  MRI was suggestive of osteomyelitis.  

Continue with iv antibiotics as per ID.  May need 4-6 weeks of iv antibiotics.  

He is s/p picc line.  Will follow up with  regarding outpatient iv 

antibiotic options.  He is on percocet prn, naproxen and gabapentin for pain.  

He was counselled on risks of continued substance abuse.  Continue with wound 

care as per podiatry.  Psychiatry follow up was appreciated for medication 

adjustment.





Cathy Lang MD


Hospitalist.

## 2018-06-24 NOTE — CP.PCM.PN
Subjective





- Date & Time of Evaluation


Date of Evaluation: 06/24/18


Time of Evaluation: 10:40





- Subjective


Subjective: 





Comfortable, no fevers, not in distress.





Objective





- Vital Signs/Intake and Output


Vital Signs (last 24 hours): 


 











Temp Pulse Resp BP Pulse Ox


 


 96.7 F L  77   20   131/76   97 


 


 06/24/18 06:00  06/24/18 06:00  06/24/18 06:00  06/24/18 06:00  06/24/18 06:00








Intake and Output: 


 











 06/24/18 06/24/18





 06:59 18:59


 


Intake Total 600 


 


Output Total 0 


 


Balance 600 














- Medications


Medications: 


 Current Medications





Acetaminophen (Tylenol 325mg Tab)  650 mg PO Q6H PRN


   PRN Reason: Pain, Mild (1-3)


Benztropine Mesylate (Cogentin)  1 mg PO [facility]


   Last Admin: 06/23/18 21:05 Dose:  1 mg


Clotrimazole (Lotrimin 1%)  0 gm TOP BID [facility]


   Last Admin: 06/23/18 17:45 Dose:  1 appl


Divalproex Sodium (Depakote Dr(*Bid*))  500 mg PO [facility]


   Last Admin: 06/23/18 21:06 Dose:  500 mg


Docusate Sodium (Colace)  100 mg PO TID [facility]


   Last Admin: 06/23/18 17:44 Dose:  100 mg


Fluoxetine HCl (Prozac)  40 mg PO DAILY [facility]


   Last Admin: 06/23/18 09:15 Dose:  40 mg


Gabapentin (Neurontin)  600 mg PO QID [facility]


   PRN Reason: Protocol


   Last Admin: 06/23/18 21:06 Dose:  600 mg


Heparin Sodium (Porcine) (Heparin)  5,000 units SC Q12 [facility]


   PRN Reason: Protocol


   Last Admin: 06/23/18 21:06 Dose:  5,000 units


Ceftriaxone Sodium (Rocephin 2 Gm Ivpb)  2 gm in 100 mls @ 100 mls/hr IVPB 

DAILY [facility]


   PRN Reason: Protocol


   Stop: 07/18/18 14:57


   Last Admin: 06/23/18 09:15 Dose:  100 mls/hr


Vancomycin HCl 1.5 gm/ Sodium (Chloride)  500 mls @ 167 mls/hr IVPB Q12H [facility]


   PRN Reason: Protocol


   Last Admin: 06/23/18 21:03 Dose:  167 mls/hr


Mupirocin (Bactroban Ointment)  10 gm TOP DAILY [facility]


   Last Admin: 06/23/18 09:14 Dose:  1 gm


Naproxen (Anaprox Ds)  550 mg PO BID [facility]


   Last Admin: 06/23/18 17:44 Dose:  550 mg


Oxycodone/Acetaminophen (Percocet 5/325 Mg Tab)  1 tab PO Q6H PRN


   PRN Reason: pain


   Stop: 06/26/18 09:16


   Last Admin: 06/24/18 05:27 Dose:  1 tab


Pantoprazole Sodium (Protonix Ec Tab)  40 mg PO 0600 [facility]


   Last Admin: 06/24/18 05:27 Dose:  40 mg


Polyethylene Glycol (Miralax)  17 gm PO DAILY [facility]


   Last Admin: 06/23/18 09:19 Dose:  17 gm


Quetiapine Fumarate 600 mg/ (Quetiapine Fumarate 100 mg)  700 mg PO 1700 [facility]


   Last Admin: 06/23/18 17:46 Dose:  700 mg


Tamsulosin HCl (Flomax)  0.4 mg PO DAILY [facility]


   Last Admin: 06/23/18 09:19 Dose:  0.4 mg











- Labs


Labs: 


 





 06/24/18 06:00 





 06/24/18 06:00 





 











APTT  30.5 Seconds (25.1-36.5)   06/17/18  07:00    














- Constitutional


Appears: Non-toxic, Chronically Ill





- Head Exam


Head Exam: NORMAL INSPECTION





- ENT Exam


ENT Exam: Mucous Membranes Moist





- Neck Exam


Neck Exam: absent: Meningismus





- Respiratory Exam


Respiratory Exam: Decreased Breath Sounds





- Cardiovascular Exam


Cardiovascular Exam: +S1, +S2





- GI/Abdominal Exam


GI & Abdominal Exam: Soft.  absent: Tenderness





Assessment and Plan





- Assessment and Plan (Free Text)


Plan: 





Assessment


Right foot cellulitis with right 5th toe osteomyelitis


schizoaffective disorder


bipolar disorder


anxiety and depression


history of right leg fasciotomy





Plan


Continue Vancomycin and Rocephin to complete 4-6 weeks of antibiotics with 

weekly Vanco trough level, CBC, CMP, ESR, CRP (day 8 today); Vanco trough is 

still below target - will repeat Vanco trough this evening

## 2018-06-24 NOTE — CP.PCM.PN
<Jose Juan Dempsey - Last Filed: 06/24/18 10:08>





Subjective





- Date & Time of Evaluation


Date of Evaluation: 06/24/18


Time of Evaluation: 10:08





- Subjective


Subjective: 





Podiatry progress note for Dr Hernandez,





46 YO male patient was garima dne valuated for right fifth digit ulcer. Patient 

is seen resting comfortably in bed. AAO x3. Patient denies any pain at this 

time. Patient denies any overnight acute events. Patient denies N/V/F/C/SOB. 

Patient has no other pedal complains at this time 





Objective





- Vital Signs/Intake and Output


Vital Signs (last 24 hours): 


 











Temp Pulse Resp BP Pulse Ox


 


 96.7 F L  77   20   131/76   97 


 


 06/24/18 06:00  06/24/18 06:00  06/24/18 06:00  06/24/18 06:00  06/24/18 06:00








Intake and Output: 


 











 06/24/18 06/24/18





 06:59 18:59


 


Intake Total 600 


 


Output Total 0 


 


Balance 600 














- Medications


Medications: 


 Current Medications





Acetaminophen (Tylenol 325mg Tab)  650 mg PO Q6H PRN


   PRN Reason: Pain, Mild (1-3)


Benztropine Mesylate (Cogentin)  1 mg PO AMHS [facility]


   Last Admin: 06/23/18 21:05 Dose:  1 mg


Clotrimazole (Lotrimin 1%)  0 gm TOP BID [facility]


   Last Admin: 06/23/18 17:45 Dose:  1 appl


Divalproex Sodium (Depakote Dr(*Bid*))  500 mg PO AMHS [facility]


   Last Admin: 06/23/18 21:06 Dose:  500 mg


Docusate Sodium (Colace)  100 mg PO TID [facility]


   Last Admin: 06/23/18 17:44 Dose:  100 mg


Fluoxetine HCl (Prozac)  40 mg PO DAILY [facility]


   Last Admin: 06/23/18 09:15 Dose:  40 mg


Gabapentin (Neurontin)  600 mg PO QID [facility]


   PRN Reason: Protocol


   Last Admin: 06/23/18 21:06 Dose:  600 mg


Heparin Sodium (Porcine) (Heparin)  5,000 units SC Q12 [facility]


   PRN Reason: Protocol


   Last Admin: 06/23/18 21:06 Dose:  5,000 units


Ceftriaxone Sodium (Rocephin 2 Gm Ivpb)  2 gm in 100 mls @ 100 mls/hr IVPB 

DAILY [facility]


   PRN Reason: Protocol


   Stop: 07/18/18 14:57


   Last Admin: 06/23/18 09:15 Dose:  100 mls/hr


Vancomycin HCl 1.5 gm/ Sodium (Chloride)  500 mls @ 167 mls/hr IVPB Q12H [facility]


   PRN Reason: Protocol


   Last Admin: 06/23/18 21:03 Dose:  167 mls/hr


Mupirocin (Bactroban Ointment)  10 gm TOP DAILY [facility]


   Last Admin: 06/23/18 09:14 Dose:  1 gm


Naproxen (Anaprox Ds)  550 mg PO BID [facility]


   Last Admin: 06/23/18 17:44 Dose:  550 mg


Oxycodone/Acetaminophen (Percocet 5/325 Mg Tab)  1 tab PO Q6H PRN


   PRN Reason: pain


   Stop: 06/26/18 09:16


   Last Admin: 06/24/18 05:27 Dose:  1 tab


Pantoprazole Sodium (Protonix Ec Tab)  40 mg PO 0600 [facility]


   Last Admin: 06/24/18 05:27 Dose:  40 mg


Polyethylene Glycol (Miralax)  17 gm PO DAILY [facility]


   Last Admin: 06/23/18 09:19 Dose:  17 gm


Quetiapine Fumarate 600 mg/ (Quetiapine Fumarate 100 mg)  700 mg PO 1700 [facility]


   Last Admin: 06/23/18 17:46 Dose:  700 mg


Tamsulosin HCl (Flomax)  0.4 mg PO DAILY [facility]


   Last Admin: 06/23/18 09:19 Dose:  0.4 mg











- Labs


Labs: 


 





 06/24/18 06:00 





 06/24/18 06:00 





 











APTT  30.5 Seconds (25.1-36.5)   06/17/18  07:00    














- Constitutional


Appears: Well, Non-toxic





- Head Exam


Head Exam: ATRAUMATIC, NORMOCEPHALIC





- Extremities Exam


Additional comments: 


Right foot focused lower extremity exam





Derm: Superficial Ulcer noted on the dorsal aspect of the right 5th digit, 100% 

granular base, erythema on the dorsal aspect of the foot is noted from dorsal 

fifth digit to the distal aspect of the metatarsals (decreased from yesterday; 

entire dorsal aspect of the foot extending proximally to the ankle joint), no 

malodor, no active drainage, no purulence noted, no probe to bone 





Vascular: DP/PT pulses are palpable 2/4, CFT <3 secs x5, TG warm to cool 

proximal to distal, non pitting edema noted on the dorsum of the foot on the 

right 





Neuro: Protective sensation grossly intact





Ortho:minimal pain on palpation noted to the right fifth digit 





- Neurological Exam


Neurological Exam: Alert, Awake, Oriented x3





Assessment and Plan





- Assessment and Plan (Free Text)


Assessment: 





47 year old male with  right 5th digit ulcer and cellulitis


Plan: 


Patient seen and evaluated


Discussed plan in detail with attending Dr. Hernandez


Labs, vitals and charts reviewed; Afebrile, WBC @ 7.8


Dressing applied using bactroban, DSD


MRI of the right foot - suspicious for OM, clinically less likely 


Cultures taken - Coag negative staph


Continue IV abx as per ID - Vancomycin 


Will continue to monitor patient while in-house; continue local wound care








<Kevin Hernandez - Last Filed: 06/25/18 08:39>





Objective





- Vital Signs/Intake and Output


Vital Signs (last 24 hours): 


 











Temp Pulse Resp BP Pulse Ox


 


 98 F   79   20   137/87   98 


 


 06/25/18 08:38  06/25/18 08:38  06/25/18 08:38  06/25/18 08:38  06/25/18 08:38








Intake and Output: 


 











 06/25/18 06/25/18





 06:59 18:59


 


Intake Total 1120 


 


Balance 1120 














- Medications


Medications: 


 Current Medications





Acetaminophen (Tylenol 325mg Tab)  650 mg PO Q6H PRN


   PRN Reason: Pain, Mild (1-3)


Benztropine Mesylate (Cogentin)  1 mg PO [facility]


   Last Admin: 06/24/18 22:12 Dose:  1 mg


Clotrimazole (Lotrimin 1%)  0 gm TOP BID [facility]


   Last Admin: 06/24/18 10:08 Dose:  1 appl


Divalproex Sodium (Depakote Dr(*Bid*))  [facility]


   Last Admin: 06/24/18 22:11 Dose:  500 mg


Docusate Sodium (Colace)  100 mg PO TID [facility]


   Last Admin: 06/24/18 17:09 Dose:  100 mg


Fluoxetine HCl (Prozac)  40 mg PO DAILY [facility]


   Last Admin: 06/24/18 10:06 Dose:  40 mg


Gabapentin (Neurontin)  600 mg PO QID [facility]


   PRN Reason: Protocol


   Last Admin: 06/24/18 22:10 Dose:  600 mg


Heparin Sodium (Porcine) (Heparin)  5,000 units SC Q12 [facility]


   PRN Reason: Protocol


   Last Admin: 06/24/18 22:08 Dose:  5,000 units


Ceftriaxone Sodium (Rocephin 2 Gm Ivpb)  2 gm in 100 mls @ 100 mls/hr IVPB 

DAILY [facility]


   PRN Reason: Protocol


   Stop: 07/18/18 14:57


   Last Admin: 06/24/18 10:07 Dose:  100 mls/hr


Vancomycin HCl 1.5 gm/ Sodium (Chloride)  500 mls @ 167 mls/hr IVPB Q12H [facility]


   PRN Reason: Protocol


   Last Admin: 06/24/18 22:12 Dose:  167 mls/hr


Mupirocin (Bactroban Ointment)  10 gm TOP DAILY [facility]


   Last Admin: 06/24/18 10:06 Dose:  1 gm


Naproxen (Anaprox Ds)  550 mg PO BID [facility]


   Last Admin: 06/24/18 17:09 Dose:  550 mg


Oxycodone/Acetaminophen (Percocet 5/325 Mg Tab)  1 tab PO Q6H PRN


   PRN Reason: pain


   Stop: 06/26/18 09:16


   Last Admin: 06/25/18 05:55 Dose:  1 tab


Pantoprazole Sodium (Protonix Ec Tab)  40 mg PO 0600 [facility]


   Last Admin: 06/25/18 05:46 Dose:  40 mg


Polyethylene Glycol (Miralax)  17 gm PO DAILY [facility]


   Last Admin: 06/24/18 10:06 Dose:  17 gm


Quetiapine Fumarate 600 mg/ (Quetiapine Fumarate 100 mg)  700 mg PO 1700 [facility]


   Last Admin: 06/24/18 17:08 Dose:  700 mg


Tamsulosin HCl (Flomax)  0.4 mg PO DAILY [facility]


   Last Admin: 06/24/18 10:06 Dose:  0.4 mg











- Labs


Labs: 


 





 06/24/18 06:00 





 06/25/18 06:45 





 











APTT  30.5 Seconds (25.1-36.5)   06/17/18  07:00    














Attending/Attestation





- Attestation


I have personally seen and examined this patient.: Yes


I have fully participated in the care of the patient.: Yes


I have reviewed all pertinent clinical information, including history, physical 

exam and plan: Yes

## 2018-06-24 NOTE — CON
HISTORY OF PRESENT ILLNESS:  The patient is a 47-year-old  male

with a long and debilitating history of polysubstance abuse and dependence,

reported diagnoses of schizoaffective disorder, bipolar type and multiple

psychiatric admissions.  He is being followed by Psychiatry on the medical

floor to adjust his psychiatric medications as he is being treated with IV

antibiotics.  I have met with the patient on multiple occasions in this

admission and I have reviewed Dr. Corey's notes.  The patient has

generally been in good control, organized, pleasant, cooperative, and

coherent throughout the course, though with some complaints of slurring,

which is possibly secondary to psychiatric medications.  I adjusted his

medications, so that Seroquel extended release would be given in the

evening instead at night so the sedating effects would occur earlier than

later as it can take 5 hours for sedation to go.  I also decreased the dose

to 700 mg.  I met with the patient at bedside and patient indicates that he

is feeling much more alert today.  Sleep wise, wholly unaffected with the

changes to his medication regimen, specifically, I also discontinued Ambien

and the patient does not demonstrate any sign or warning during the course

of my interview.  He is coherent and his responses are goal directed and

relevant to questioning and they are consistent.  He tells that his mood is

stable and his anxiety is under control and the patient does not appear to

be in any distress and he is comfortable.  Denies perceptual disturbance

and he does not appears to be responding to internal stimuli.  His insight

and judgment continued to be fair.



Vital signs are reviewed and lab work results are also reviewed by this

provider.



RELEVANT PSYCHIATRIC MEDICATIONS:  Include Cogentin 1 mg a.m. and at

bedtime, Depakote _____ mg a.m. and at bedtime, Prozac 40 mg daily, and

Seroquel  mg p.o. in the evening.



IMPRESSION:  As per history, bipolar disorder versus schizoaffective

bipolar disorder, history of polysubstance abuse and dependence, rule out

substance-induced psychosis.  These appeared to be noncontributory to the

patient's presentation at this time and he is stable.



RECOMMENDATIONS:  At this time, the patient appears to be improved,

constantly stable during multiple psychiatric followups on medical floor. 

There was no indication to change his medications at this time.  This

provider will sign off, please reconsult if there are any acute changes in

the patient's psychiatric presentation.







__________________________________________

Devendra Lopez MD



DD:  06/23/2018 10:54:46

DT:  06/23/2018 17:07:11

Job # [id number]

## 2018-06-25 LAB
BUN SERPL-MCNC: 9 MG/DL (ref 7–21)
CALCIUM SERPL-MCNC: 8.5 MG/DL (ref 8.4–10.5)
GFR NON-AFRICAN AMERICAN: > 60

## 2018-06-25 RX ADMIN — NAPROXEN SODIUM SCH MG: 550 TABLET ORAL at 17:44

## 2018-06-25 RX ADMIN — QUETIAPINE SCH MG: 300 TABLET, EXTENDED RELEASE ORAL at 17:55

## 2018-06-25 RX ADMIN — NAPROXEN SODIUM SCH MG: 550 TABLET ORAL at 10:01

## 2018-06-25 RX ADMIN — HYDROCORTISONE SCH APPLIC: 10 CREAM TOPICAL at 21:18

## 2018-06-25 RX ADMIN — DIVALPROEX SODIUM SCH MG: 500 TABLET, DELAYED RELEASE ORAL at 10:01

## 2018-06-25 RX ADMIN — DIVALPROEX SODIUM SCH MG: 500 TABLET, DELAYED RELEASE ORAL at 21:48

## 2018-06-25 RX ADMIN — OXYCODONE HYDROCHLORIDE AND ACETAMINOPHEN PRN TAB: 5; 325 TABLET ORAL at 11:43

## 2018-06-25 RX ADMIN — CLOTRIMAZOLE SCH APPL: 1 CREAM TOPICAL at 10:07

## 2018-06-25 RX ADMIN — CEFTRIAXONE SCH MLS/HR: 2 INJECTION, POWDER, FOR SOLUTION INTRAMUSCULAR; INTRAVENOUS at 10:02

## 2018-06-25 RX ADMIN — OXYCODONE HYDROCHLORIDE AND ACETAMINOPHEN PRN TAB: 5; 325 TABLET ORAL at 17:45

## 2018-06-25 RX ADMIN — POLYETHYLENE GLYCOL 3350 SCH GM: 17 POWDER, FOR SOLUTION ORAL at 10:01

## 2018-06-25 RX ADMIN — PANTOPRAZOLE SODIUM SCH MG: 40 TABLET, DELAYED RELEASE ORAL at 05:46

## 2018-06-25 RX ADMIN — OXYCODONE HYDROCHLORIDE AND ACETAMINOPHEN PRN TAB: 5; 325 TABLET ORAL at 05:55

## 2018-06-25 RX ADMIN — MUPIROCIN SCH GM: 20 OINTMENT TOPICAL at 10:06

## 2018-06-25 RX ADMIN — CLOTRIMAZOLE SCH: 1 CREAM TOPICAL at 17:47

## 2018-06-25 NOTE — CP.PCM.PN
Subjective





- Date & Time of Evaluation


Date of Evaluation: 06/25/18


Time of Evaluation: 10:25





- Subjective


Subjective: 





No fevers, not in distress, still with right foot pain but a little less, no 

nausea or diarrhea.





Objective





- Vital Signs/Intake and Output


Vital Signs (last 24 hours): 


 











Temp Pulse Resp BP Pulse Ox


 


 97.9 F   94 H  19   135/81   96 


 


 06/24/18 16:28  06/24/18 16:28  06/24/18 16:28  06/24/18 16:28  06/24/18 16:28








Intake and Output: 


 











 06/25/18 06/25/18





 06:59 18:59


 


Intake Total 1120 


 


Balance 1120 














- Medications


Medications: 


 Current Medications





Acetaminophen (Tylenol 325mg Tab)  650 mg PO Q6H PRN


   PRN Reason: Pain, Mild (1-3)


Benztropine Mesylate (Cogentin)  1 mg PO AMHS [facility]


   Last Admin: 06/24/18 22:12 Dose:  1 mg


Clotrimazole (Lotrimin 1%)  0 gm TOP BID [facility]


   Last Admin: 06/24/18 10:08 Dose:  1 appl


Divalproex Sodium (Depakote Dr(*Bid*))  500 mg PO AMHS [facility]


   Last Admin: 06/24/18 22:11 Dose:  500 mg


Docusate Sodium (Colace)  100 mg PO TID [facility]


   Last Admin: 06/24/18 17:09 Dose:  100 mg


Fluoxetine HCl (Prozac)  40 mg PO DAILY [facility]


   Last Admin: 06/24/18 10:06 Dose:  40 mg


Gabapentin (Neurontin)  600 mg PO QID [facility]


   PRN Reason: Protocol


   Last Admin: 06/24/18 22:10 Dose:  600 mg


Heparin Sodium (Porcine) (Heparin)  5,000 units SC Q12 [facility]


   PRN Reason: Protocol


   Last Admin: 06/24/18 22:08 Dose:  5,000 units


Ceftriaxone Sodium (Rocephin 2 Gm Ivpb)  2 gm in 100 mls @ 100 mls/hr IVPB 

DAILY [facility]


   PRN Reason: Protocol


   Stop: 07/18/18 14:57


   Last Admin: 06/24/18 10:07 Dose:  100 mls/hr


Vancomycin HCl 1.5 gm/ Sodium (Chloride)  500 mls @ 167 mls/hr IVPB Q12H [facility]


   PRN Reason: Protocol


   Last Admin: 06/24/18 22:12 Dose:  167 mls/hr


Mupirocin (Bactroban Ointment)  10 gm TOP DAILY [facility]


   Last Admin: 06/24/18 10:06 Dose:  1 gm


Naproxen (Anaprox Ds)  550 mg PO BID [facility]


   Last Admin: 06/24/18 17:09 Dose:  550 mg


Oxycodone/Acetaminophen (Percocet 5/325 Mg Tab)  1 tab PO Q6H PRN


   PRN Reason: pain


   Stop: 06/26/18 09:16


   Last Admin: 06/25/18 05:55 Dose:  1 tab


Pantoprazole Sodium (Protonix Ec Tab)  40 mg PO 0600 [facility]


   Last Admin: 06/25/18 05:46 Dose:  40 mg


Polyethylene Glycol (Miralax)  17 gm PO DAILY [facility]


   Last Admin: 06/24/18 10:06 Dose:  17 gm


Quetiapine Fumarate 600 mg/ (Quetiapine Fumarate 100 mg)  700 mg PO 1700 [facility]


   Last Admin: 06/24/18 17:08 Dose:  700 mg


Tamsulosin HCl (Flomax)  0.4 mg PO DAILY [facility]


   Last Admin: 06/24/18 10:06 Dose:  0.4 mg











- Labs


Labs: 


 





 06/24/18 06:00 





 06/24/18 06:00 





 











APTT  30.5 Seconds (25.1-36.5)   06/17/18  07:00    














- Constitutional


Appears: Non-toxic, Chronically Ill





- Head Exam


Head Exam: NORMAL INSPECTION





- ENT Exam


ENT Exam: Mucous Membranes Moist





- Neck Exam


Neck Exam: absent: Meningismus





- Respiratory Exam


Respiratory Exam: Decreased Breath Sounds





- Cardiovascular Exam


Cardiovascular Exam: +S1, +S2





- GI/Abdominal Exam


GI & Abdominal Exam: Soft.  absent: Tenderness





- Extremities Exam


Additional comments: 





right arm PICC line, site clean and intact





Assessment and Plan





- Assessment and Plan (Free Text)


Plan: 





Assessment


Right foot cellulitis with right 5th toe osteomyelitis


schizoaffective disorder


bipolar disorder


anxiety and depression


history of right leg fasciotomy





Plan


Continue Vancomycin and Rocephin to complete 4-6 weeks of antibiotics with 

weekly Vanco trough level, CBC, CMP, ESR, CRP (day 9 today); Vanco trough is 

still below target - will repeat Vanco trough today

## 2018-06-25 NOTE — CP.PCM.PN
<Jefe Bournegashaneka - Last Filed: 06/25/18 10:41>





Subjective





- Date & Time of Evaluation


Date of Evaluation: 06/25/18


Time of Evaluation: 10:42





- Subjective


Subjective: 





Podiatry progress note for Dr Hernandez/Dr. Albright





46 YO male patient was seen and  evaluated for right fifth digit ulcer. Patient 

is seen resting comfortably in bed. AAO x3. Patient denies any pain at this 

time. Patient denies any overnight acute events. Patient denies N/V/F/C/SOB. 

Patient has no other pedal complains at this time 








Objective





- Vital Signs/Intake and Output


Vital Signs (last 24 hours): 


 











Temp Pulse Resp BP Pulse Ox


 


 98 F   79   20   137/87   98 


 


 06/25/18 08:38  06/25/18 08:38  06/25/18 08:38  06/25/18 08:38  06/25/18 08:38








Intake and Output: 


 











 06/25/18 06/25/18





 06:59 18:59


 


Intake Total 1120 


 


Balance 1120 














- Medications


Medications: 


 Current Medications





Acetaminophen (Tylenol 325mg Tab)  650 mg PO Q6H PRN


   PRN Reason: Pain, Mild (1-3)


Benztropine Mesylate (Cogentin)  1 mg PO AMHS [facility]


   Last Admin: 06/25/18 10:01 Dose:  1 mg


Clotrimazole (Lotrimin 1%)  0 gm TOP BID [facility]


   Last Admin: 06/25/18 10:07 Dose:  1 appl


Divalproex Sodium (Depakote Dr(*Bid*))  500 mg PO AMHS [facility]


   Last Admin: 06/25/18 10:01 Dose:  500 mg


Docusate Sodium (Colace)  100 mg PO TID [facility]


   Last Admin: 06/25/18 10:01 Dose:  100 mg


Fluoxetine HCl (Prozac)  40 mg PO DAILY [facility]


   Last Admin: 06/25/18 10:00 Dose:  40 mg


Gabapentin (Neurontin)  600 mg PO QID [facility]


   PRN Reason: Protocol


   Last Admin: 06/25/18 10:00 Dose:  600 mg


Heparin Sodium (Porcine) (Heparin)  5,000 units SC Q12 [facility]


   PRN Reason: Protocol


   Last Admin: 06/25/18 10:01 Dose:  5,000 units


Ceftriaxone Sodium (Rocephin 2 Gm Ivpb)  2 gm in 100 mls @ 100 mls/hr IVPB 

DAILY [facility]


   PRN Reason: Protocol


   Stop: 07/18/18 14:57


   Last Admin: 06/25/18 10:02 Dose:  100 mls/hr


Vancomycin HCl 1.5 gm/ Sodium (Chloride)  500 mls @ 167 mls/hr IVPB Q12[facility]


   PRN Reason: Protocol


   Last Admin: 06/24/18 22:12 Dose:  167 mls/hr


Mupirocin (Bactroban Ointment)  10 gm TOP DAILY [facility]


   Last Admin: 06/25/18 10:06 Dose:  1 gm


Naproxen (Anaprox Ds)  550 mg PO BID [facility]


   Last Admin: 06/25/18 10:01 Dose:  550 mg


Oxycodone/Acetaminophen (Percocet 5/325 Mg Tab)  1 tab PO Q6H [facility]


   PRN Reason: pain


   Stop: 06/26/18 09:16


   Last Admin: 06/25/18 05:55 Dose:  1 tab


Pantoprazole Sodium (Protonix Ec Tab)  40 mg PO 0600 [facility]


   Last Admin: 06/25/18 05:46 Dose:  40 mg


Polyethylene Glycol (Miralax)  17 gm PO DAILY [facility]


   Last Admin: 06/25/18 10:01 Dose:  17 gm


Quetiapine Fumarate 600 mg/ (Quetiapine Fumarate 100 mg)  700 mg PO 1700 [facility]


   Last Admin: 06/24/18 17:08 Dose:  700 mg


Tamsulosin HCl (Flomax)  0.4 mg PO DAILY [facility]


   Last Admin: 06/25/18 10:00 Dose:  0.4 mg











- Labs


Labs: 


 





 06/24/18 06:00 





 06/25/18 06:45 





 











APTT  30.5 Seconds (25.1-36.5)   06/17/18  07:00    














- Constitutional


Appears: Well, Non-toxic, No Acute Distress





- Extremities Exam


Additional comments: 





Right foot focused lower extremity exam





Derm: Superficial Ulcer noted on the dorsal aspect of the right 5th digit, 100% 

granular base, erythema on the dorsal aspect of the foot is noted from dorsal 

fifth digit to the distal aspect of the metatarsals (decreased from yesterday; 

entire dorsal aspect of the foot extending proximally to the ankle joint), no 

malodor, no active drainage, no purulence noted, no probe to bone 





Vascular: DP/PT pulses are palpable 2/4, CFT <3 secs x5, TG warm to cool 

proximal to distal, non pitting edema noted on the dorsum of the foot on the 

right 





Neuro: Protective sensation grossly intact





Ortho:minimal pain on palpation noted to the right fifth digit 





- Neurological Exam


Neurological Exam: Alert, Awake, Oriented x3





- Psychiatric Exam


Psychiatric exam: Normal Affect, Normal Mood





Assessment and Plan





- Assessment and Plan (Free Text)


Assessment: 





47 year old male with  right 5th digit ulcer and cellulitis


Plan: 





Patient seen and evaluated with attending Dr. Hernandez


Labs, vitals and charts reviewed; Afebrile, WBC @ 7.8 as of yesterday


Dressing applied using bactroban, 4x4, tape


MRI of the right foot - suspicious for OM, clinically less likely 


Cultures taken - Coag negative staph


Continue IV abx as per ID - Ceftriaxone, Vancomycin


Will continue to monitor patient while in-house; continue local wound care





<Kevin Hernandez - Last Filed: 06/26/18 09:50>





Objective





- Vital Signs/Intake and Output


Vital Signs (last 24 hours): 


 











Temp Pulse Resp BP Pulse Ox


 


 98.2 F   86   16   138/71   97 


 


 06/26/18 08:16  06/26/18 08:16  06/26/18 08:16  06/26/18 08:16  06/26/18 08:16








Intake and Output: 


 











 06/26/18 06/26/18





 06:59 18:59


 


Intake Total 300 


 


Balance 300 














- Medications


Medications: 


 Current Medications





Acetaminophen (Tylenol 325mg Tab)  650 mg PO Q6H PRN


   PRN Reason: Pain, Mild (1-3)


Benztropine Mesylate (Cogentin)  1 mg PO [facility]


   Last Admin: 06/25/18 21:48 Dose:  1 mg


Clotrimazole (Lotrimin 1%)  0 gm TOP BID [facility]


   Last Admin: 06/25/18 17:47 Dose:  Not Given


Divalproex Sodium (Depakote Dr(*Bid*))  500 mg PO [facility]


   Last Admin: 06/25/18 21:48 Dose:  500 mg


Docusate Sodium (Colace)  100 mg PO TID [facility]


   Last Admin: 06/25/18 17:44 Dose:  100 mg


Fluoxetine HCl (Prozac)  40 mg PO DAILY [facility]


   Last Admin: 06/25/18 10:00 Dose:  40 mg


Gabapentin (Neurontin)  600 mg PO QID [facility]


   PRN Reason: Protocol


   Last Admin: 06/25/18 21:48 Dose:  600 mg


Heparin Sodium (Porcine) (Heparin)  5,000 units SC Q12 [facility]


   PRN Reason: Protocol


   Last Admin: 06/25/18 21:48 Dose:  5,000 units


Hydrocortisone (Cortizone 1% Cream)  0 gm TOP Q12 [facility]


   Last Admin: 06/25/18 21:18 Dose:  1 applic


Ceftriaxone Sodium (Rocephin 2 Gm Ivpb)  2 gm in 100 mls @ 100 mls/hr IVPB 

DAILY [facility]


   PRN Reason: Protocol


   Stop: 07/18/18 14:57


   Last Admin: 06/25/18 10:02 Dose:  100 mls/hr


Vancomycin HCl 1.5 gm/ Sodium (Chloride)  500 mls @ 167 mls/hr IVPB Q12H [facility]


   PRN Reason: Protocol


   Last Admin: 06/25/18 21:49 Dose:  167 mls/hr


Mupirocin (Bactroban Ointment)  10 gm TOP DAILY [facility]


   Last Admin: 06/25/18 10:06 Dose:  1 gm


Naproxen (Anaprox Ds)  550 mg PO BID [facility]


   Last Admin: 06/25/18 17:44 Dose:  550 mg


Pantoprazole Sodium (Protonix Ec Tab)  40 mg PO 0600 [facility]


   Last Admin: 06/26/18 05:17 Dose:  40 mg


Polyethylene Glycol (Miralax)  17 gm PO DAILY [facility]


   Last Admin: 06/25/18 10:01 Dose:  17 gm


Quetiapine Fumarate 600 mg/ (Quetiapine Fumarate 100 mg)  700 mg PO 1700 [facility]


   Last Admin: 06/25/18 17:55 Dose:  700 mg


Tamsulosin HCl (Flomax)  0.4 mg PO DAILY [facility]


   Last Admin: 06/25/18 10:00 Dose:  0.4 mg











- Labs


Labs: 


 





 06/26/18 06:20 





 06/26/18 06:20 





 











APTT  30.5 Seconds (25.1-36.5)   06/17/18  07:00    














Attending/Attestation





- Attestation


I have personally seen and examined this patient.: Yes


I have fully participated in the care of the patient.: Yes


I have reviewed all pertinent clinical information, including history, physical 

exam and plan: Yes

## 2018-06-25 NOTE — CP.PCM.PN
<Lou Diamond - Last Filed: 06/25/18 16:02>





Subjective





- Date & Time of Evaluation


Date of Evaluation: 06/25/18


Time of Evaluation: 15:53





- Subjective


Subjective: 





Internal Medicine Progress Note - Hospitalist Service





Patient seen and examined at bedside. Per nursing no acute events overnight. 

Patient is doing well, offers no complaints at this time. Pain is controlled. 

Denies headaches, dizziness, cp, palpitations, sob, abdominal pain, urinary 

symptoms, changes in bowel habits. 





Objective





- Vital Signs/Intake and Output


Vital Signs (last 24 hours): 


 











Temp Pulse Resp BP Pulse Ox


 


 98 F   79   20   137/87   98 


 


 06/25/18 08:38  06/25/18 08:38  06/25/18 08:38  06/25/18 08:38  06/25/18 08:38








Intake and Output: 


 











 06/25/18 06/25/18





 06:59 18:59


 


Intake Total 1120 600


 


Balance 1120 600














- Medications


Medications: 


 Current Medications





Acetaminophen (Tylenol 325mg Tab)  650 mg PO Q6H PRN


   PRN Reason: Pain, Mild (1-3)


Benztropine Mesylate (Cogentin)  1 mg PO AMHS [facility]


   Last Admin: 06/25/18 10:01 Dose:  1 mg


Clotrimazole (Lotrimin 1%)  0 gm TOP BID [facility]


   Last Admin: 06/25/18 10:07 Dose:  1 appl


Divalproex Sodium (Depakote Dr(*Bid*))  500 mg PO AMHS [facility]


   Last Admin: 06/25/18 10:01 Dose:  500 mg


Docusate Sodium (Colace)  100 mg PO TID [facility]


   Last Admin: 06/25/18 15:05 Dose:  100 mg


Fluoxetine HCl (Prozac)  40 mg PO DAILY [facility]


   Last Admin: 06/25/18 10:00 Dose:  40 mg


Gabapentin (Neurontin)  600 mg PO QID [facility]


   PRN Reason: Protocol


   Last Admin: 06/25/18 15:05 Dose:  600 mg


Heparin Sodium (Porcine) (Heparin)  5,000 units SC Q12 [facility]


   PRN Reason: Protocol


   Last Admin: 06/25/18 10:01 Dose:  5,000 units


Ceftriaxone Sodium (Rocephin 2 Gm Ivpb)  2 gm in 100 mls @ 100 mls/hr IVPB 

DAILY [facility]


   PRN Reason: Protocol


   Stop: 07/18/18 14:57


   Last Admin: 06/25/18 10:02 Dose:  100 mls/hr


Vancomycin HCl 1.5 gm/ Sodium (Chloride)  500 mls @ 167 mls/hr IVPB Q12H [facility]


   PRN Reason: Protocol


   Last Admin: 06/25/18 11:22 Dose:  167 mls/hr


Mupirocin (Bactroban Ointment)  10 gm TOP DAILY [facility]


   Last Admin: 06/25/18 10:06 Dose:  1 gm


Naproxen (Anaprox Ds)  550 mg PO BID [facility]


   Last Admin: 06/25/18 10:01 Dose:  550 mg


Oxycodone/Acetaminophen (Percocet 5/325 Mg Tab)  1 tab PO Q6H PRN


   PRN Reason: pain


   Stop: 06/26/18 09:16


   Last Admin: 06/25/18 11:43 Dose:  1 tab


Pantoprazole Sodium (Protonix Ec Tab)  40 mg PO 0600 [facility]


   Last Admin: 06/25/18 05:46 Dose:  40 mg


Polyethylene Glycol (Miralax)  17 gm PO DAILY [facility]


   Last Admin: 06/25/18 10:01 Dose:  17 gm


Quetiapine Fumarate 600 mg/ (Quetiapine Fumarate 100 mg)  700 mg PO 1700 [facility]


   Last Admin: 06/24/18 17:08 Dose:  700 mg


Tamsulosin HCl (Flomax)  0.4 mg PO DAILY [facility]


   Last Admin: 06/25/18 10:00 Dose:  0.4 mg











- Labs


Labs: 


 





 06/24/18 06:00 





 06/25/18 06:45 





 











APTT  30.5 Seconds (25.1-36.5)   06/17/18  07:00    














- Additional Findings


Additional findings: 





- Constitutional


Appears: No Acute Distress





- Head Exam


Head Exam: ATRAUMATIC, NORMAL INSPECTION, NORMOCEPHALIC





- Eye Exam


Eye Exam: EOMI, Normal appearance


Pupil Exam: NORMAL ACCOMODATION





- ENT Exam


ENT Exam: Mucous Membranes Moist; erythematous, flaky, non-pustular rash 

surrounding nasal bridge, cheeks, and chin





- Neck Exam


Neck Exam: Full ROM





- Respiratory Exam


Respiratory Exam: Clear to Ausculation Bilateral, NORMAL BREATHING PATTERN.  

absent: Rales, Rhonchi, Wheezes





- Cardiovascular Exam


Cardiovascular Exam: REGULAR RHYTHM, +S1, +S2





- GI/Abdominal Exam


GI & Abdominal Exam: Soft, Normal Bowel Sounds.  absent: Guarding, Rigid, 

Tenderness





- Extremities Exam


Additional comments: 





Right lower extremity: dressing intact





- Back Exam


Back Exam: NORMAL INSPECTION





- Neurological Exam


Neurological Exam: Alert, Awake, Oriented x3





- Psychiatric Exam


Psychiatric exam: Normal Affect, Normal Mood





- Skin


Skin Exam: Dry, Normal Color, Warm





Assessment and Plan





- Assessment and Plan (Free Text)


Assessment: 





A/P: Patient is a 48 yo M with PMH bipolar disorder, schizoaffective disorder, 

polysubstance abuse, and right leg compartment syndrome s/p fasciotomy, 

presents for right leg cellulitis 2/2 trauma, also found to have tachycardia:





Right 5th phalangeal Osteomyelitis/Right Lower extremity Cellulitis:


-Stable, afebrile, no leukocytosis


-CRP elevated, ESR elevated


-X ray foot: soft tissue swelling without acute articular or osseous 

abnormality. No osteomyelitis


-MRI foot: soft tissue swelling of dorsum and 5th digit, edema is identified 

within the fused mid and distal 5th phalanges suspcious of osteomyelitis, mild 

soft tissue swelling of the 1st and 2nd digits also suspicious of osteomyelitis


-Antibiotics: Vancomycin 1.5gm Q12H, Rocephin 2gm daily (when clear for 

discharge, can D/C with IV Daptomycin)


-Vanco trough 9.4 on 6/23, repeat ordered


-Wound culture 6/20 shows light growth of coag neg staph; repeat wound culture 6 /21 showing no growth


-Lower extremity US: normal VALORIE/PVR


-Pain control: Percocet 5 mg q6h PRN, Naproxen 550mg PO BID


-Colace 100mg PO TID and Miralax 17gm daily for constipation


-ID on consult, help appreciated


-Podiatry on consult, help appreciated


-Physical Therapy: recommends home 


-PICC line inserted in right arm for long term IV antibiotics use





Tachycardia (resolved)


-Currently asymptomatic


-Echocardiogram showed mild concentric left ventricular hypertrophy, EF 45%, 

trace MR and TR





Face rash 


- Clotrimazole to be applied daily





Hx of BPH: 


-Continue flomax





Hx of Mood disorder, schizoaffective disorder 


-Continue Seroquel and Fluoxetine


-Continue Depakote and Cogentin


-Psych on consult, help appreciated


-Patient complaining about slurred speech, no focal/neurological deficits; will 

see if psych meds can be adjusted





Hx of Neuropathy: 


-Continue with Neurontin 600mg QID





Insomnia


-Ambien PRN





History of substance abuse


-UTOX on admission negative


-Cessation advised





DVT/GI prophylaxis: 


-Heparin 5000 Q12 [facility] 


-Protonix 40mg PO daily





DISPO: Patient will follow up with PMD, Dr Hanson upon discharge. Discussed 

case with Podiatry, surgery unlikely at this time. Patient requires long term 

IV antibiotics. Case management following, awaiting to hear back from [facility]. Plan discussed with Dr Jeffery Diamond DO PGY-1











<Quinten Gil - Last Filed: 06/27/18 14:04>





Objective





- Vital Signs/Intake and Output


Vital Signs (last 24 hours): 


 











Temp Pulse Resp BP Pulse Ox


 


 98.0 F   90   20   139/98 H  97 


 


 06/27/18 06:00  06/27/18 06:00  06/27/18 06:00  06/27/18 06:00  06/27/18 06:00








Intake and Output: 


 











 06/27/18 06/27/18





 06:59 18:59


 


Intake Total 480 


 


Balance 480 














- Medications


Medications: 


 Current Medications





Acetaminophen (Tylenol 325mg Tab)  650 mg PO Q6H PRN


   PRN Reason: Pain, Mild (1-3)


Benztropine Mesylate (Cogentin)  1 mg PO AMHS [facility]


   Last Admin: 06/27/18 09:08 Dose:  1 mg


Divalproex Sodium (Depakote Dr(*Bid*))  500 mg PO AMHS [facility]


   Last Admin: 06/27/18 09:07 Dose:  500 mg


Docusate Sodium (Colace)  100 mg PO TID [facility]


   Last Admin: 06/27/18 12:59 Dose:  100 mg


Fluoxetine HCl (Prozac)  40 mg PO DAILY [facility]


   Last Admin: 06/27/18 09:07 Dose:  40 mg


Gabapentin (Neurontin)  600 mg PO QID [facility]


   PRN Reason: Protocol


   Last Admin: 06/27/18 12:59 Dose:  600 mg


Heparin Sodium (Porcine) (Heparin)  5,000 units SC Q12 [facility]


   PRN Reason: Protocol


   Last Admin: 06/27/18 09:08 Dose:  5,000 units


Hydrocortisone (Cortizone 1% Cream)  0 gm TOP Q12 [facility]


   Last Admin: 06/27/18 09:10 Dose:  1 applic


Ceftriaxone Sodium (Rocephin 2 Gm Ivpb)  2 gm in 100 mls @ 100 mls/hr IVPB 

[facility]ILY [facility]


   PRN Reason: Protocol


   Stop: 07/18/18 14:57


   Last Admin: 06/27/18 09:09 Dose:  100 mls/hr


Vancomycin HCl 1.5 gm/ Sodium (Chloride)  500 mls @ 167 mls/hr IVPB Q12H [facility]


   PRN Reason: Protocol


   Last Admin: 06/27/18 10:49 Dose:  167 mls/hr


Mupirocin (Bactroban Ointment)  10 gm TOP DAILY [facility]


   Last Admin: 06/27/18 09:10 Dose:  1 gm


Naproxen (Anaprox Ds)  550 mg PO BID [facility]


   Last Admin: 06/27/18 09:08 Dose:  550 mg


Oxycodone/Acetaminophen (Percocet 5/325 Mg Tab)  1 tab PO Q6H PRN


   PRN Reason: Pain, moderate (4-7)


   Stop: 06/29/18 11:21


   Last Admin: 06/27/18 06:22 Dose:  1 tab


Pantoprazole Sodium (Protonix Ec Tab)  40 mg PO 0600 [facility]


   Last Admin: 06/27/18 06:21 Dose:  40 mg


Polyethylene Glycol (Miralax)  17 gm PO DAILY [facility]


   Last Admin: 06/27/18 09:08 Dose:  17 gm


Quetiapine Fumarate 600 mg/ (Quetiapine Fumarate 100 mg)  700 mg PO 1700 [facility]


   Last Admin: 06/26/18 17:56 Dose:  700 mg


Tamsulosin HCl (Flomax)  0.4 mg PO DAILY [facility]


   Last Admin: 06/27/18 09:07 Dose:  0.4 mg











- Labs


Labs: 


 





 06/26/18 06:20 





 06/26/18 06:20 





 











APTT  30.5 Seconds (25.1-36.5)   06/17/18  07:00    














Attending/Attestation





- Attestation


I have personally seen and examined this patient.: Yes


I have fully participated in the care of the patient.: Yes


I have reviewed all pertinent clinical information, including history, physical 

exam and plan: Yes


Notes (Text): 





06/27/18 13:56





Attending note;





Patient seen and examined with resident.





Patient is a 47 year old male with past medical history of bipolar, 

schizoaffective disorder, polysubstance abuse, and right leg compartment 

syndrome s/p fasciotomy presented with right leg cellulitis.  MRI was 

suggestive of osteomyelitis.


Right lower extremity cellulitis is resolved.


No redness.


Continue dressing per podiatry.


Currently on IV vancomycin and Rocephin.


 needs 4-6 weeks of iv antibiotics. 


s/p picc line placement.





Follow-up with  for discharge planning.





Upon discharge the patient will follow-up with PMD .








06/27/18 14:03

## 2018-06-26 LAB
BASOPHILS # BLD AUTO: 0.05 K/MM3 (ref 0–2)
BASOPHILS NFR BLD: 0.6 % (ref 0–3)
BUN SERPL-MCNC: 12 MG/DL (ref 7–21)
CALCIUM SERPL-MCNC: 8.6 MG/DL (ref 8.4–10.5)
EOSINOPHIL # BLD: 0.2 10*3/UL (ref 0–0.7)
EOSINOPHIL NFR BLD: 2.5 % (ref 1.5–5)
ERYTHROCYTE [DISTWIDTH] IN BLOOD BY AUTOMATED COUNT: 13.5 % (ref 11.5–14.5)
GFR NON-AFRICAN AMERICAN: > 60
GRANULOCYTES # BLD: 5.41 10*3/UL (ref 1.4–6.5)
GRANULOCYTES NFR BLD: 61 % (ref 50–68)
HGB BLD-MCNC: 12.6 G/DL (ref 14–18)
LYMPHOCYTES # BLD: 2.6 10*3/UL (ref 1.2–3.4)
LYMPHOCYTES NFR BLD AUTO: 28.8 % (ref 22–35)
MCH RBC QN AUTO: 30.1 PG (ref 25–35)
MCHC RBC AUTO-ENTMCNC: 33.6 G/DL (ref 31–37)
MCV RBC AUTO: 89.5 FL (ref 80–105)
MONOCYTES # BLD AUTO: 0.6 10*3/UL (ref 0.1–0.6)
MONOCYTES NFR BLD: 7.1 % (ref 1–6)
PLATELET # BLD: 138 10^3/UL (ref 120–450)
PMV BLD AUTO: 12.1 FL (ref 7–11)
RBC # BLD AUTO: 4.19 10^6/UL (ref 3.5–6.1)
WBC # BLD AUTO: 8.9 10^3/UL (ref 4.5–11)

## 2018-06-26 RX ADMIN — NAPROXEN SODIUM SCH MG: 550 TABLET ORAL at 17:57

## 2018-06-26 RX ADMIN — DIVALPROEX SODIUM SCH MG: 500 TABLET, DELAYED RELEASE ORAL at 22:06

## 2018-06-26 RX ADMIN — DIVALPROEX SODIUM SCH MG: 500 TABLET, DELAYED RELEASE ORAL at 10:36

## 2018-06-26 RX ADMIN — CLOTRIMAZOLE SCH: 1 CREAM TOPICAL at 10:38

## 2018-06-26 RX ADMIN — MUPIROCIN SCH GM: 20 OINTMENT TOPICAL at 10:39

## 2018-06-26 RX ADMIN — NAPROXEN SODIUM SCH MG: 550 TABLET ORAL at 10:37

## 2018-06-26 RX ADMIN — OXYCODONE HYDROCHLORIDE AND ACETAMINOPHEN PRN TAB: 5; 325 TABLET ORAL at 11:22

## 2018-06-26 RX ADMIN — OXYCODONE HYDROCHLORIDE AND ACETAMINOPHEN PRN TAB: 5; 325 TABLET ORAL at 04:44

## 2018-06-26 RX ADMIN — QUETIAPINE SCH MG: 300 TABLET, EXTENDED RELEASE ORAL at 17:56

## 2018-06-26 RX ADMIN — POLYETHYLENE GLYCOL 3350 SCH GM: 17 POWDER, FOR SOLUTION ORAL at 10:37

## 2018-06-26 RX ADMIN — HYDROCORTISONE SCH APPLIC: 10 CREAM TOPICAL at 10:35

## 2018-06-26 RX ADMIN — HYDROCORTISONE SCH APPLIC: 10 CREAM TOPICAL at 22:08

## 2018-06-26 RX ADMIN — PANTOPRAZOLE SODIUM SCH MG: 40 TABLET, DELAYED RELEASE ORAL at 05:17

## 2018-06-26 RX ADMIN — OXYCODONE HYDROCHLORIDE AND ACETAMINOPHEN PRN TAB: 5; 325 TABLET ORAL at 17:57

## 2018-06-26 RX ADMIN — CEFTRIAXONE SCH MLS/HR: 2 INJECTION, POWDER, FOR SOLUTION INTRAMUSCULAR; INTRAVENOUS at 10:35

## 2018-06-26 NOTE — CP.PCM.PN
<Senia Bourne - Last Filed: 06/26/18 13:43>





Subjective





- Date & Time of Evaluation


Date of Evaluation: 06/26/18


Time of Evaluation: 11:59





- Subjective


Subjective: 


Podiatry progress note for Dr. Hernandez/Dr. Albright





46 y/o male patient was seen and evaluated for right fifth digit ulcer. Patient 

is seen resting comfortably in bed. Patient is AAOx3. Patient denies any pain 

at this time. Patient denies any overnight acute events. Patient denies N/V/F/C/

SOB. Patient has no other pedal complains at this time 








Objective





- Vital Signs/Intake and Output


Vital Signs (last 24 hours): 


 











Temp Pulse Resp BP Pulse Ox


 


 98.2 F   86   16   138/71   97 


 


 06/26/18 08:16  06/26/18 08:16  06/26/18 08:16  06/26/18 08:16  06/26/18 08:16








Intake and Output: 


 











 06/26/18 06/26/18





 06:59 18:59


 


Intake Total 300 


 


Balance 300 














- Medications


Medications: 


 Current Medications





Acetaminophen (Tylenol 325mg Tab)  650 mg PO Q6H PRN


   PRN Reason: Pain, Mild (1-3)


Benztropine Mesylate (Cogentin)  1 mg PO AMHS [facility]


   Last Admin: 06/26/18 10:40 Dose:  1 mg


Clotrimazole (Lotrimin 1%)  0 gm TOP BID [facility]


   Last Admin: 06/26/18 10:38 Dose:  Not Given


Divalproex Sodium (Depakote Dr(*Bid*))  500 mg PO AMHS [facility]


   Last Admin: 06/26/18 10:36 Dose:  500 mg


Docusate Sodium (Colace)  100 mg PO TID [facility]


   Last Admin: 06/26/18 10:36 Dose:  100 mg


Fluoxetine HCl (Prozac)  40 mg PO DAILY [facility]


   Last Admin: 06/26/18 10:36 Dose:  40 mg


Gabapentin (Neurontin)  600 mg PO QID [facility]


   PRN Reason: Protocol


   Last Admin: 06/26/18 10:36 Dose:  600 mg


Heparin Sodium (Porcine) (Heparin)  5,000 units SC Q12 [facility]


   PRN Reason: Protocol


   Last Admin: 06/26/18 10:37 Dose:  5,000 units


Hydrocortisone (Cortizone 1% Cream)  0 gm TOP Q12 [facility]


   Last Admin: 06/26/18 10:35 Dose:  1 applic


Ceftriaxone Sodium (Rocephin 2 Gm Ivpb)  2 gm in 100 mls @ 100 mls/hr IVPB 

DAILY [facility]


   PRN Reason: Protocol


   Stop: 07/18/18 14:57


   Last Admin: 06/26/18 10:35 Dose:  100 mls/hr


Vancomycin HCl 1.5 gm/ Sodium (Chloride)  500 mls @ 167 mls/hr IVPB Q12H [facility]


   PRN Reason: Protocol


   Last Admin: 06/26/18 11:20 Dose:  167 mls/hr


Mupirocin (Bactroban Ointment)  10 gm TOP DAILY [facility]


   Last Admin: 06/26/18 10:39 Dose:  1 gm


Naproxen (Anaprox Ds)  550 mg PO BID [facility]


   Last Admin: 06/26/18 10:37 Dose:  550 mg


Oxycodone/Acetaminophen (Percocet 5/325 Mg Tab)  1 tab PO Q6H [facility]


   PRN Reason: Pain, moderate (4-7)


   Stop: 06/29/18 11:21


   Last Admin: 06/26/18 11:22 Dose:  1 tab


Pantoprazole Sodium (Protonix Ec Tab)  40 mg PO 0600 [facility]


   Last Admin: 06/26/18 05:17 Dose:  40 mg


Polyethylene Glycol (Miralax)  17 gm PO DAILY [facility]


   Last Admin: 06/26/18 10:37 Dose:  17 gm


Quetiapine Fumarate 600 mg/ (Quetiapine Fumarate 100 mg)  700 mg PO 1700 [facility]


   Last Admin: 06/25/18 17:55 Dose:  700 mg


Tamsulosin HCl (Flomax)  0.4 mg PO DAILY [facility]


   Last Admin: 06/26/18 10:36 Dose:  0.4 mg











- Labs


Labs: 


 





 06/26/18 06:20 





 06/26/18 06:20 





 











APTT  30.5 Seconds (25.1-36.5)   06/17/18  07:00    














- Constitutional


Appears: Well, Non-toxic, No Acute Distress





- Extremities Exam


Additional comments: 





Right foot focused lower extremity exam





Derm: Superficial Ulcer with hyperkeratotic skin edges noted on the dorsal 

aspect of the right 5th digit, very minimal erythema on the dorsal aspect of 

the 5th digit which is localized to the area (decreased from yesterday), no 

malodor, no active drainage, no purulence noted, no probe to bone 





Vascular: DP/PT pulses are palpable 2/4, CFT <3 secs x5, TG warm to cool 

proximal to distal, non pitting edema noted on the dorsum of the foot on the 

right 





Neuro: Protective sensation grossly intact





Ortho:minimal pain on palpation noted to the right fifth digit 





- Neurological Exam


Neurological Exam: Alert, Awake, Oriented x3





- Psychiatric Exam


Psychiatric exam: Normal Affect, Normal Mood





Assessment and Plan





- Assessment and Plan (Free Text)


Assessment: 





47 year old male with  right 5th digit ulcer and cellulitis


Plan: 





Patient seen and evaluated 


Discussed plan with attending Dr. Hernandez


Labs, vitals and charts reviewed; Afebrile, WBC @ 8.9


Site cleaned using saline and dressing applied using bactroban, 4x4, tape


MRI of the right foot - suspicious for OM, clinically less likely 


Cultures taken - Coag negative staph


Continue IV abx as per ID - Ceftriaxone, Vancomycin


Stable from podiatry standpoint


Will continue to monitor patient while in-house; continue local wound care


Upon discharge, follow up with attending Dr. Albright/Dr. Hernandez at the wound 

care center





<Kevin Hernandez - Last Filed: 06/27/18 09:02>





Objective





- Vital Signs/Intake and Output


Vital Signs (last 24 hours): 


 











Temp Pulse Resp BP Pulse Ox


 


 98.0 F   90   20   139/98 H  97 


 


 06/27/18 06:00  06/27/18 06:00  06/27/18 06:00  06/27/18 06:00  06/27/18 06:00








Intake and Output: 


 











 06/27/18 06/27/18





 06:59 18:59


 


Intake Total 480 


 


Balance 480 














- Medications


Medications: 


 Current Medications





Acetaminophen (Tylenol 325mg Tab)  650 mg PO Q6H PRN


   PRN Reason: Pain, Mild (1-3)


Benztropine Mesylate (Cogentin)  1 mg PO [facility]


   Last Admin: 06/26/18 22:06 Dose:  1 mg


Divalproex Sodium (Depakote Dr(*Bid*))  [facility]


   Last Admin: 06/26/18 22:06 Dose:  500 mg


Docusate Sodium (Colace)  100 mg PO TID [facility]


   Last Admin: 06/26/18 17:57 Dose:  100 mg


Fluoxetine HCl (Prozac)  40 mg PO DAILY [facility]


   Last Admin: 06/26/18 10:36 Dose:  40 mg


Gabapentin (Neurontin)  600 mg PO QID [facility]


   PRN Reason: Protocol


   Last Admin: 06/26/18 22:06 Dose:  600 mg


Heparin Sodium (Porcine) (Heparin)  5,000 units SC Q12 [facility]


   PRN Reason: Protocol


   Last Admin: 06/26/18 22:07 Dose:  5,000 units


Hydrocortisone (Cortizone 1% Cream)  0 gm TOP Q12 [facility]


   Last Admin: 06/26/18 22:08 Dose:  1 applic


Ceftriaxone Sodium (Rocephin 2 Gm Ivpb)  2 gm in 100 mls @ 100 mls/hr IVPB 

DAILY [facility]


   PRN Reason: Protocol


   Stop: 07/18/18 14:57


   Last Admin: 06/26/18 10:35 Dose:  100 mls/hr


Vancomycin HCl 1.5 gm/ Sodium (Chloride)  500 mls @ 167 mls/hr IVPB Q12H [facility]


   PRN Reason: Protocol


   Last Admin: 06/26/18 22:07 Dose:  167 mls/hr


Mupirocin (Bactroban Ointment)  10 gm TOP DAILY [facility]


   Last Admin: 06/26/18 10:39 Dose:  1 gm


Naproxen (Anaprox Ds)  550 mg PO BID [facility]


   Last Admin: 06/26/18 17:57 Dose:  550 mg


Oxycodone/Acetaminophen (Percocet 5/325 Mg Tab)  1 tab PO Q6H [facility]


   PRN Reason: Pain, moderate (4-7)


   Stop: 06/29/18 11:21


   Last Admin: 06/27/18 06:22 Dose:  1 tab


Pantoprazole Sodium (Protonix Ec Tab)  40 mg PO 0600 [facility]


   Last Admin: 06/27/18 06:21 Dose:  40 mg


Polyethylene Glycol (Miralax)  17 gm PO DAILY [facility]


   Last Admin: 06/26/18 10:37 Dose:  17 gm


Quetiapine Fumarate 600 mg/ (Quetiapine Fumarate 100 mg)  700 mg PO 1700 [facility]


   Last Admin: 06/26/18 17:56 Dose:  700 mg


Tamsulosin HCl (Flomax)  0.4 mg PO DAILY [facility]


   Last Admin: 06/26/18 10:36 Dose:  0.4 mg











- Labs


Labs: 


 





 06/26/18 06:20 





 06/26/18 06:20 





 











APTT  30.5 Seconds (25.1-36.5)   06/17/18  07:00    














Attending/Attestation





- Attestation


I have personally seen and examined this patient.: Yes


I have fully participated in the care of the patient.: Yes


I have reviewed all pertinent clinical information, including history, physical 

exam and plan: Yes

## 2018-06-26 NOTE — CP.PCM.PN
<Lou Diamond - Last Filed: 06/26/18 14:52>





Subjective





- Date & Time of Evaluation


Date of Evaluation: 06/26/18


Time of Evaluation: 09:49





- Subjective


Subjective: 





Internal Medicine Progress Note - Hospitalist Service





Patient seen and examined at bedside. Per nursing no acute events. Patient is 

doing well, offers no complaints at this time. Pain is controlled. Denies 

headaches, dizziness, cp, palpitations, sob, abdominal pain, urinary symptoms, 

changes in bowel habits. 





Objective





- Vital Signs/Intake and Output


Vital Signs (last 24 hours): 


 











Temp Pulse Resp BP Pulse Ox


 


 98.2 F   86   16   138/71   97 


 


 06/26/18 08:16  06/26/18 08:16  06/26/18 08:16  06/26/18 08:16  06/26/18 08:16








Intake and Output: 


 











 06/26/18 06/26/18





 06:59 18:59


 


Intake Total 300 


 


Balance 300 














- Medications


Medications: 


 Current Medications





Acetaminophen (Tylenol 325mg Tab)  650 mg PO Q6H PRN


   PRN Reason: Pain, Mild (1-3)


Benztropine Mesylate (Cogentin)  [facility]


   Last Admin: 06/25/18 21:48 Dose:  1 mg


Clotrimazole (Lotrimin 1%)  0 gm TOP BID [facility]


   Last Admin: 06/25/18 17:47 Dose:  Not Given


Divalproex Sodium (Depakote Dr(*Bid*))  500 mg PO [facility]


   Last Admin: 06/25/18 21:48 Dose:  500 mg


Docusate Sodium (Colace)  100 mg PO TID [facility]


   Last Admin: 06/25/18 17:44 Dose:  100 mg


Fluoxetine HCl (Prozac)  40 mg PO DAILY [facility]


   Last Admin: 06/25/18 10:00 Dose:  40 mg


Gabapentin (Neurontin)  600 mg PO QID [facility]


   PRN Reason: Protocol


   Last Admin: 06/25/18 21:48 Dose:  600 mg


Heparin Sodium (Porcine) (Heparin)  5,000 units SC Q12 [facility]


   PRN Reason: Protocol


   Last Admin: 06/25/18 21:48 Dose:  5,000 units


Hydrocortisone (Cortizone 1% Cream)  0 gm TOP Q12 [facility]


   Last Admin: 06/25/18 21:18 Dose:  1 applic


Ceftriaxone Sodium (Rocephin 2 Gm Ivpb)  2 gm in 100 mls @ 100 mls/hr IVPB 

DAILY [facility]


   PRN Reason: Protocol


   Stop: 07/18/18 14:57


   Last Admin: 06/25/18 10:02 Dose:  100 mls/hr


Vancomycin HCl 1.5 gm/ Sodium (Chloride)  500 mls @ 167 mls/hr IVPB Q12H [facility]


   PRN Reason: Protocol


   Last Admin: 06/25/18 21:49 Dose:  167 mls/hr


Mupirocin (Bactroban Ointment)  10 gm TOP DAILY [facility]


   Last Admin: 06/25/18 10:06 Dose:  1 gm


Naproxen (Anaprox Ds)  550 mg PO BID [facility]


   Last Admin: 06/25/18 17:44 Dose:  550 mg


Pantoprazole Sodium (Protonix Ec Tab)  40 mg PO 0600 [facility]


   Last Admin: 06/26/18 05:17 Dose:  40 mg


Polyethylene Glycol (Miralax)  17 gm PO DAILY [facility]


   Last Admin: 06/25/18 10:01 Dose:  17 gm


Quetiapine Fumarate 600 mg/ (Quetiapine Fumarate 100 mg)  700 mg PO 1700 [facility]


   Last Admin: 06/25/18 17:55 Dose:  700 mg


Tamsulosin HCl (Flomax)  0.4 mg PO DAILY [facility]


   Last Admin: 06/25/18 10:00 Dose:  0.4 mg











- Labs


Labs: 


 





 06/26/18 06:20 





 06/26/18 06:20 





 











APTT  30.5 Seconds (25.1-36.5)   06/17/18  07:00    














- Additional Findings


Additional findings: 





- Constitutional


Appears: No Acute Distress





- Head Exam


Head Exam: ATRAUMATIC, NORMAL INSPECTION, NORMOCEPHALIC





- Eye Exam


Eye Exam: EOMI, Normal appearance


Pupil Exam: NORMAL ACCOMODATION





- ENT Exam


ENT Exam: Mucous Membranes Moist; erythematous, flaky, non-pustular rash 

surrounding nasal bridge, cheeks, and chin





- Neck Exam


Neck Exam: Full ROM





- Respiratory Exam


Respiratory Exam: Clear to Ausculation Bilateral, NORMAL BREATHING PATTERN.  

absent: Rales, Rhonchi, Wheezes





- Cardiovascular Exam


Cardiovascular Exam: REGULAR RHYTHM, +S1, +S2





- GI/Abdominal Exam


GI & Abdominal Exam: Soft, Normal Bowel Sounds.  absent: Guarding, Rigid, 

Tenderness





- Extremities Exam


Additional comments: 





Right lower leg cellulitis improved, right toe ulceration improving





- Back Exam


Back Exam: NORMAL INSPECTION





- Neurological Exam


Neurological Exam: Alert, Awake, Oriented x3





- Psychiatric Exam


Psychiatric exam: Normal Affect, Normal Mood





- Skin


Skin Exam: Dry, Normal Color, Warm





Assessment and Plan





- Assessment and Plan (Free Text)


Assessment: 





A/P: Patient is a 46 yo M with PMH bipolar disorder, schizoaffective disorder, 

polysubstance abuse, and right leg compartment syndrome s/p fasciotomy, 

presents for right leg cellulitis 2/2 trauma, also found to have tachycardia:





Right 5th phalangeal Osteomyelitis/Right Lower extremity Cellulitis:


-Stable, afebrile, no leukocytosis


-CRP elevated, ESR elevated


-X ray foot: soft tissue swelling without acute articular or osseous 

abnormality. No osteomyelitis


-MRI foot: soft tissue swelling of dorsum and 5th digit, edema is identified 

within the fused mid and distal 5th phalanges suspcious of osteomyelitis, mild 

soft tissue swelling of the 1st and 2nd digits also suspicious of osteomyelitis


-Antibiotics: Vancomycin 1.5gm Q12H, Rocephin 2gm daily (when clear for 

discharge, can D/C with IV Daptomycin)


-Weekly Vanco trough, no need for daily labs at this time


-Wound culture 6/20 shows light growth of coag neg staph; repeat wound culture 6 /21 showing no growth


-Lower extremity US: normal VALORIE/PVR


-Pain control: Percocet 5 mg q6h PRN, Naproxen 550mg PO BID


-Colace 100mg PO TID and Miralax 17gm daily for constipation


-ID on consult, help appreciated


-Podiatry on consult, help appreciated


-Physical Therapy: recommends home 


-PICC line inserted in right arm for long term IV antibiotics use





Tachycardia (resolved)


-Currently asymptomatic


-Echocardiogram showed mild concentric left ventricular hypertrophy, EF 45%, 

trace MR and TR





Face rash 


-Clotrimazole to be applied daily





Hx of BPH: 


-Continue flomax





Hx of Mood disorder, schizoaffective disorder 


-Continue Seroquel and Fluoxetine


-Continue Depakote and Cogentin


-Psych on consult, help appreciated





Hx of Neuropathy: 


-Continue with Neurontin 600mg QID





Insomnia


-Ambien PRN





History of substance abuse


-UTOX on admission negative


-Cessation advised





DVT/GI prophylaxis: 


-Heparin 5000 Q12 [facility] 


-Protonix 40mg PO daily





DISPO: Patient will follow up with PMD, Dr Hanson upon discharge. Discussed 

case with Podiatry, surgery unlikely at this time. Patient requires long term 

IV antibiotics. Case management following, awaiting to hear back from [facility]. Plan discussed with Dr Jeffery Diamond DO PGY-1











<Quinten Gil - Last Filed: 06/27/18 14:05>





Objective





- Vital Signs/Intake and Output


Vital Signs (last 24 hours): 


 











Temp Pulse Resp BP Pulse Ox


 


 98.0 F   90   20   139/98 H  97 


 


 06/27/18 06:00  06/27/18 06:00  06/27/18 06:00  06/27/18 06:00  06/27/18 06:00








Intake and Output: 


 











 06/27/18 06/27/18





 06:59 18:59


 


Intake Total 480 


 


Balance 480 














- Medications


Medications: 


 Current Medications





Acetaminophen (Tylenol 325mg Tab)  650 mg PO Q6H PRN


   PRN Reason: Pain, Mild (1-3)


Benztropine Mesylate (Cogentin)  1 mg PO [facility]


   Last Admin: 06/27/18 09:08 Dose:  1 mg


Divalproex Sodium (Depakote Dr(*Bid*))  500 mg PO [facility]


   Last Admin: 06/27/18 09:07 Dose:  500 mg


Docusate Sodium (Colace)  100 mg PO TID [facility]


   Last Admin: 06/27/18 12:59 Dose:  100 mg


Fluoxetine HCl (Prozac)  40 mg PO DAILY [facility]


   Last Admin: 06/27/18 09:07 Dose:  40 mg


Gabapentin (Neurontin)  600 mg PO QID [facility]


   PRN Reason: Protocol


   Last Admin: 06/27/18 12:59 Dose:  600 mg


Heparin Sodium (Porcine) (Heparin)  5,000 units SC Q12 [facility]


   PRN Reason: Protocol


   Last Admin: 06/27/18 09:08 Dose:  5,000 units


Hydrocortisone (Cortizone 1% Cream)  0 gm TOP Q12 [facility]


   Last Admin: 06/27/18 09:10 Dose:  1 applic


Ceftriaxone Sodium (Rocephin 2 Gm Ivpb)  2 gm in 100 mls @ 100 mls/hr IVPB 

DAILY [facility]


   PRN Reason: Protocol


   Stop: 07/18/18 14:57


   Last Admin: 06/27/18 09:09 Dose:  100 mls/hr


Vancomycin HCl 1.5 gm/ Sodium (Chloride)  500 mls @ 167 mls/hr IVPB Q12H [facility]


   PRN Reason: Protocol


   Last Admin: 06/27/18 10:49 Dose:  167 mls/hr


Mupirocin (Bactroban Ointment)  10 gm TOP DAILY [facility]


   Last Admin: 06/27/18 09:10 Dose:  1 gm


Naproxen (Anaprox Ds)  550 mg PO BID [facility]


   Last Admin: 06/27/18 09:08 Dose:  550 mg


Oxycodone/Acetaminophen (Percocet 5/325 Mg Tab)  1 tab PO Q6H PRN


   PRN Reason: Pain, moderate (4-7)


   Stop: 06/29/18 11:21


   Last Admin: 06/27/18 06:22 Dose:  1 tab


Pantoprazole Sodium (Protonix Ec Tab)  40 mg PO 0600 [facility]


   Last Admin: 06/27/18 06:21 Dose:  40 mg


Polyethylene Glycol (Miralax)  17 gm PO DAILY [facility]


   Last Admin: 06/27/18 09:08 Dose:  17 gm


Quetiapine Fumarate 600 mg/ (Quetiapine Fumarate 100 mg)  700 mg PO 1700 [facility]


   Last Admin: 06/26/18 17:56 Dose:  700 mg


Tamsulosin HCl (Flomax)  0.4 mg PO DAILY [facility]


   Last Admin: 06/27/18 09:07 Dose:  0.4 mg











- Labs


Labs: 


 





 06/26/18 06:20 





 06/26/18 06:20 





 











APTT  30.5 Seconds (25.1-36.5)   06/17/18  07:00    














Attending/Attestation





- Attestation


I have personally seen and examined this patient.: Yes


I have fully participated in the care of the patient.: Yes


I have reviewed all pertinent clinical information, including history, physical 

exam and plan: Yes


Notes (Text): 





06/27/18 14:04





Attending note;





Patient seen and examined with resident.





Patient is a 47 year old male with past medical history of bipolar, 

schizoaffective disorder, polysubstance abuse, and right leg compartment 

syndrome s/p fasciotomy presented with right leg cellulitis.  MRI was 

suggestive of osteomyelitis.


Right lower extremity cellulitis is resolved.


No redness.


Continue dressing per podiatry.


Currently on IV vancomycin and Rocephin. Needs to complete 4-6 weeks of 

antibiotics.


s/p picc line placement.





Follow-up with  for discharge planning.





Upon discharge the patient will follow-up with PMD .


06/27/18 14:05

## 2018-06-27 VITALS
SYSTOLIC BLOOD PRESSURE: 139 MMHG | DIASTOLIC BLOOD PRESSURE: 98 MMHG | RESPIRATION RATE: 20 BRPM | TEMPERATURE: 98 F | HEART RATE: 90 BPM | OXYGEN SATURATION: 97 %

## 2018-06-27 RX ADMIN — POLYETHYLENE GLYCOL 3350 SCH GM: 17 POWDER, FOR SOLUTION ORAL at 09:08

## 2018-06-27 RX ADMIN — OXYCODONE HYDROCHLORIDE AND ACETAMINOPHEN PRN TAB: 5; 325 TABLET ORAL at 06:22

## 2018-06-27 RX ADMIN — NAPROXEN SODIUM SCH MG: 550 TABLET ORAL at 17:39

## 2018-06-27 RX ADMIN — NAPROXEN SODIUM SCH MG: 550 TABLET ORAL at 09:08

## 2018-06-27 RX ADMIN — DIVALPROEX SODIUM SCH MG: 500 TABLET, DELAYED RELEASE ORAL at 09:07

## 2018-06-27 RX ADMIN — CEFTRIAXONE SCH MLS/HR: 2 INJECTION, POWDER, FOR SOLUTION INTRAMUSCULAR; INTRAVENOUS at 09:09

## 2018-06-27 RX ADMIN — PANTOPRAZOLE SODIUM SCH MG: 40 TABLET, DELAYED RELEASE ORAL at 06:21

## 2018-06-27 RX ADMIN — HYDROCORTISONE SCH APPLIC: 10 CREAM TOPICAL at 09:10

## 2018-06-27 RX ADMIN — MUPIROCIN SCH GM: 20 OINTMENT TOPICAL at 09:10

## 2018-06-27 RX ADMIN — QUETIAPINE SCH MG: 300 TABLET, EXTENDED RELEASE ORAL at 17:40

## 2018-06-27 NOTE — CP.PCM.DIS
<Lou Diamond - Last Filed: 06/27/18 15:09>





Provider





- Provider


Date of Admission: 


06/16/18 19:11





Attending physician: 


Quinten Gil MD





Primary care physician: 


Kip Hanson MD





Consults: 





Podiatry: Jose Ramon


ID: Nellie





Time Spent in preparation of Discharge (in minutes): 35





Hospital Course





- Lab Results


Lab Results: 


 Micro Results





06/21/18 10:04   Toe   Gram Stain - Final


06/21/18 10:04   Toe   Wound Culture - Final


                            No Growth


06/20/18 16:26   Toe   Gram Stain - Final


06/20/18 16:26   Toe   Wound Culture - Final


                            Coagulase Neg Staphylococcus


06/17/18 13:00   Nose   MRSA Culture (Admit) - Final


                            MRSA NOT DETECTED





 Most Recent Lab Values











WBC  8.9 10^3/ul (4.5-11.0)   06/26/18  06:20    


 


RBC  4.19 10^6/uL (3.5-6.1)   06/26/18  06:20    


 


Hgb  12.6 g/dL (14.0-18.0)  L  06/26/18  06:20    


 


Hct  37.5 % (42.0-52.0)  L  06/26/18  06:20    


 


MCV  89.5 fl (80.0-105.0)   06/26/18  06:20    


 


MCH  30.1 pg (25.0-35.0)   06/26/18  06:20    


 


MCHC  33.6 g/dl (31.0-37.0)   06/26/18  06:20    


 


RDW  13.5 % (11.5-14.5)   06/26/18  06:20    


 


Plt Count  138 10^3/uL (120.0-450.0)   06/26/18  06:20    


 


MPV  12.1 fl (7.0-11.0)  H  06/26/18  06:20    


 


Gran %  61.0 % (50.0-68.0)   06/26/18  06:20    


 


Lymph % (Auto)  28.8 % (22.0-35.0)   06/26/18  06:20    


 


Mono % (Auto)  7.1 % (1.0-6.0)  H  06/26/18  06:20    


 


Eos % (Auto)  2.5 % (1.5-5.0)   06/26/18  06:20    


 


Baso % (Auto)  0.6 % (0.0-3.0)   06/26/18  06:20    


 


Gran #  5.41  (1.4-6.5)   06/26/18  06:20    


 


Lymph # (Auto)  2.6  (1.2-3.4)   06/26/18  06:20    


 


Mono # (Auto)  0.6  (0.1-0.6)   06/26/18  06:20    


 


Eos # (Auto)  0.2  (0.0-0.7)   06/26/18  06:20    


 


Baso # (Auto)  0.05 K/mm3 (0.0-2.0)   06/26/18  06:20    


 


ESR  35 mm/hr (0.0-15.0)  H  06/16/18  18:00    


 


APTT  30.5 Seconds (25.1-36.5)   06/17/18  07:00    


 


pO2  168 mm/Hg (30-55)  H  06/16/18  16:30    


 


VBG pH  7.47  (7.32-7.43)  H  06/16/18  16:30    


 


VBG pCO2  35.0  (40-60)  L  06/16/18  16:30    


 


VBG HCO3  25.5 mmol/l (21-28)   06/16/18  16:30    


 


VBG Total CO2  26.6 mmol.L (22-28)   06/16/18  16:30    


 


VBG O2 Sat (Calc)  100.0 % (40-65)  H  06/16/18  16:30    


 


VBG Base Excess  2.1 mmol/L (0.0-2.0)  H  06/16/18  16:30    


 


VBG Potassium  3.6 mmol/L (3.6-5.2)   06/16/18  16:30    


 


Sodium  140.0 mmol/L (132-148)   06/16/18  16:30    


 


Chloride  109.0 mmol/L ()  H  06/16/18  16:30    


 


Glucose  141 mg/dl ()  H  06/16/18  16:30    


 


Lactate  1.8 mmol/L (0.7-2.1)   06/16/18  16:30    


 


FiO2  21.0 %  06/16/18  16:30    


 


Sodium  142 mmol/L (132-148)   06/26/18  06:20    


 


Potassium  4.0 mmol/L (3.6-5.0)   06/26/18  06:20    


 


Chloride  103 mmol/L ()   06/26/18  06:20    


 


Carbon Dioxide  29 mmol/L (21-33)   06/26/18  06:20    


 


Anion Gap  15  (10-20)   06/26/18  06:20    


 


BUN  12 mg/dL (7-21)   06/26/18  06:20    


 


Creatinine  0.7 mg/dl (0.8-1.5)  L  06/26/18  06:20    


 


Est GFR ( Amer)  > 60   06/26/18  06:20    


 


Est GFR (Non-Af Amer)  > 60   06/26/18  06:20    


 


Random Glucose  119 mg/dL ()  H  06/26/18  06:20    


 


Calcium  8.6 mg/dL (8.4-10.5)   06/26/18  06:20    


 


Phosphorus  2.4 mg/dL (2.5-4.5)  L  06/16/18  16:30    


 


Magnesium  1.9 mg/dL (1.7-2.2)   06/16/18  16:30    


 


Total Bilirubin  0.2 mg/dL (0.2-1.3)   06/16/18  16:30    


 


AST  23 U/L (17-59)   06/16/18  16:30    


 


ALT  33 U/L (7-56)   06/16/18  16:30    


 


Alkaline Phosphatase  86 U/L ()   06/16/18  16:30    


 


C-React Prot High Sens  > 15.00 mg/L (1.00-3.00)  H  06/16/18  18:00    


 


Total Protein  6.8 g/dL (5.8-8.3)   06/16/18  16:30    


 


Albumin  3.9 g/dL (3.0-4.8)   06/16/18  16:30    


 


Globulin  2.9 gm/dL  06/16/18  16:30    


 


Albumin/Globulin Ratio  1.3  (1.1-1.8)   06/16/18  16:30    


 


Venous Blood Potassium  3.6 mmol/L (3.6-5.2)   06/16/18  16:30    


 


Urine Color  Yellow  (YELLOW)   06/16/18  17:05    


 


Urine Appearance  Clear  (CLEAR)   06/16/18  17:05    


 


Urine pH  7.0  (4.7-8.0)   06/16/18  17:05    


 


Ur Specific Gravity  <= 1.005  (1.005-1.035)   06/16/18  17:05    


 


Urine Protein  Negative mg/dL (<30 mg/dL)   06/16/18  17:05    


 


Urine Glucose (UA)  Negative mg/dL (NEGATIVE)   06/16/18  17:05    


 


Urine Ketones  Negative mg/dL (NEGATIVE)   06/16/18  17:05    


 


Urine Blood  Negative  (NEGATIVE)   06/16/18  17:05    


 


Urine Nitrate  Negative  (NEGATIVE)   06/16/18  17:05    


 


Urine Bilirubin  Negative  (NEGATIVE)   06/16/18  17:05    


 


Urine Urobilinogen  0.2 E.U./dL (<1 E.U./dL)   06/16/18  17:05    


 


Ur Leukocyte Esterase  Negative Crystal/uL (NEGATIVE)   06/16/18  17:05    


 


Vancomycin Trough  9.4 ug/mL (5.0-10.0)   06/23/18  08:40    


 


Urine Opiates Screen  Negative  (NEGATIVE)   06/16/18  17:05    


 


Urine Methadone Screen  Negative  (NEGATIVE)   06/16/18  17:05    


 


Ur Barbiturates Screen  Negative  (NEGATIVE)   06/16/18  17:05    


 


Valproic Acid  50 ug/mL (50.0-100.0)   06/19/18  05:45    


 


Ur Phencyclidine Scrn  Negative  (NEGATIVE)   06/16/18  17:05    


 


Ur Amphetamines Screen  Negative  (NEGATIVE)   06/16/18  17:05    


 


U Benzodiazepines Scrn  Negative  (NEGATIVE)   06/16/18  17:05    


 


U Oth Cocaine Metabols  Negative  (NEGATIVE)   06/16/18  17:05    


 


U Cannabinoids Screen  Negative  (NEGATIVE)   06/16/18  17:05    


 


Alcohol, Quantitative  < 10 mg/dL (0-10)   06/16/18  18:00    














- Hospital Course


Hospital Course: 





HPI: This is a 46 yo M with PMH bipolar disorder, schizoaffective disorder, 

polysubstance abuse, and hx right leg compartment syndrome s/p fasciotomy who 

presents with complaint of right 5th toe swelling and pain which began 

overnight.  Was discharged against medical advice from Psych unit on 6/14/18.  

.  Patient reports pain at site only. Denies any hx of injection drug use, 

states last used 2 weeks ago.  Denies any hx of cellulitis in legs, denies any 

hx diabetes or immunocompromised conditions (i.e. HIV).  Denies recent trauma 

to site.  Denies systemic sx, including fevers, chills, nausea, generalized 

weakness/malaise, or fatigue.  Of note, on exam in ED, site acutely swollen 

with erythema extending into distal 1/3rd of foot, 2 other discrete areas of 

erythema and warmth appreciated on other aspects of the leg, and patient 

tachycardic to 130's.  All other ROS in 12-system review negative.  Patient 

reports compliance with all home medications.





Hospital Course: Patient was admitted for lower extremity cellulites. Right 

foot X-ray: soft tissue swelling without acute articular or osseous 

abnormality. Podiatry and ID were consulted. Right foot MRI: soft tissue 

swelling of dorsum and 5th digit, edema is identified within the fused mid and 

distal 5th phalanges suspicious of osteomyelitis, mild soft tissue swelling of 

the 1st and 2nd digits also suspicious of osteomyelitis (see full report). ESR 

and CRP were elevated. VALORIE/PVR were within normal limits. Patient was on IV 

Vancomycin and IV Rocephin. Wound cultures grew coagulase negative staph, light 

growth. Repeat wound cultures showed no growth. Right upper extremity PICC line 

was placed for long term IV antibiotics. During hospitalization patient was 

complaining of slurred speech after taking his psych medications. He did not 

have any focal neurological deficits. Psych was consulted and psych medications 

were adjusted. During stay patient was tachycardic, echo was performed and 

showed mild concentric left ventricular hypertrophy, EF 45%, trace MR and TR. 

Patient had a facial rash that he attributed to stress, Clotrimazole and 

Hydrocortisone cream were applied with improvement in rash. 





On day of discharge patient was doing well, pain is controlled. Ambulating and 

tolerating diet. Patient accepted to [facility]. Per ID, patient to 

continue IV Vancomycin and IV Rocephin for total 6 weeks. Treatment started on 6 /16/18. Patient will need weekly Vanco trough, CBC, CMP, ESR, CRP. Patient to 

follow up with PMD Dr Hanson upon discharge. All questions and concerns were 

addressed.





Discharge Medications:


IV Vancomycin 1.5gm Q12 hours x 31 days


IV Rocephin 2gm daily x 31 days


Percocet 5/325mg PO Q6H prn pain


Colace 100mg PO TID


Naproxen 550mg PO BID 


Hydrocortisone 1% cream prn





Discharge Exam





- Additional Findings


Additional findings: 





- Constitutional


Appears: Well, No Acute Distress





- Head Exam


Head Exam: ATRAUMATIC, NORMAL INSPECTION, NORMOCEPHALIC





- Eye Exam


Eye Exam: EOMI, Normal appearance





- ENT Exam


ENT Exam: Mucous Membranes Moist





- Neck Exam


Neck Exam: Full ROM





- Respiratory Exam


Respiratory Exam: Clear to Ausculation Bilateral, NORMAL BREATHING PATTERN.  

absent: Rales, Rhonchi, Wheezes





- Cardiovascular Exam


Cardiovascular Exam: REGULAR RHYTHM, +S1, +S2





- GI/Abdominal Exam


GI & Abdominal Exam: Soft, Normal Bowel Sounds.  absent: Guarding, Rigid, 

Tenderness





- Extremities Exam


Extremities Exam: Full ROM


Additional comments: 





Right foot dressing intact





- Back Exam


Back Exam: NORMAL INSPECTION





- Neurological Exam


Neurological Exam: Alert, Awake, Normal Gait, Oriented x3





- Psychiatric Exam


Psychiatric exam: Normal Affect, Normal Mood





- Skin


Skin Exam: Dry, Normal Color, Warm





Discharge Plan





- Discharge Medications


Prescriptions: 


cefTRIAXone 1 gm [Rocephin 1 gram IVPB] 2 gm IVPB DAILY #31 bag





- Follow Up Plan


Condition: FAIR


Disposition: TRANSF TO SNF


Instructions:  Cellulitis (Skin Infection), Adult (DC)


Additional Instructions: 


1. Patient is clear for discharge to [facility]


2. Patient to complete IV Vancomycin and Rocephin for total 6 weeks (started 6/ 16/18)


3. Please draw vanco trough tomorrow 6/28/18; then monitor CBC, CMP, ESR, CRP, 

vanco trough weekly


4. Upon discharge, follow up with Dr. Albright at wound care center 


5. Please follow up with PMD, Dr Hanson within 1 week 


Referrals: 


Linda Albright DPM [Staff Provider] - 


Kip Hanson MD [Primary Care Provider] - 





<Quinten Gil - Last Filed: 06/28/18 15:30>





Provider





- Provider


Date of Admission: 


06/16/18 19:11





Attending physician: 


Quinten Gil MD





Primary care physician: 


Kip Hanson MD








Hospital Course





- Lab Results


Lab Results: 


 Micro Results





06/21/18 10:04   Toe   Gram Stain - Final


06/21/18 10:04   Toe   Wound Culture - Final


                            No Growth


06/20/18 16:26   Toe   Gram Stain - Final


06/20/18 16:26   Toe   Wound Culture - Final


                            Coagulase Neg Staphylococcus


06/17/18 13:00   Nose   MRSA Culture (Admit) - Final


                            MRSA NOT DETECTED





 Most Recent Lab Values











WBC  8.9 10^3/ul (4.5-11.0)   06/26/18  06:20    


 


RBC  4.19 10^6/uL (3.5-6.1)   06/26/18  06:20    


 


Hgb  12.6 g/dL (14.0-18.0)  L  06/26/18  06:20    


 


Hct  37.5 % (42.0-52.0)  L  06/26/18  06:20    


 


MCV  89.5 fl (80.0-105.0)   06/26/18  06:20    


 


MCH  30.1 pg (25.0-35.0)   06/26/18  06:20    


 


MCHC  33.6 g/dl (31.0-37.0)   06/26/18  06:20    


 


RDW  13.5 % (11.5-14.5)   06/26/18  06:20    


 


Plt Count  138 10^3/uL (120.0-450.0)   06/26/18  06:20    


 


MPV  12.1 fl (7.0-11.0)  H  06/26/18  06:20    


 


Gran %  61.0 % (50.0-68.0)   06/26/18  06:20    


 


Lymph % (Auto)  28.8 % (22.0-35.0)   06/26/18  06:20    


 


Mono % (Auto)  7.1 % (1.0-6.0)  H  06/26/18  06:20    


 


Eos % (Auto)  2.5 % (1.5-5.0)   06/26/18  06:20    


 


Baso % (Auto)  0.6 % (0.0-3.0)   06/26/18  06:20    


 


Gran #  5.41  (1.4-6.5)   06/26/18  06:20    


 


Lymph # (Auto)  2.6  (1.2-3.4)   06/26/18  06:20    


 


Mono # (Auto)  0.6  (0.1-0.6)   06/26/18  06:20    


 


Eos # (Auto)  0.2  (0.0-0.7)   06/26/18  06:20    


 


Baso # (Auto)  0.05 K/mm3 (0.0-2.0)   06/26/18  06:20    


 


ESR  35 mm/hr (0.0-15.0)  H  06/16/18  18:00    


 


APTT  30.5 Seconds (25.1-36.5)   06/17/18  07:00    


 


pO2  168 mm/Hg (30-55)  H  06/16/18  16:30    


 


VBG pH  7.47  (7.32-7.43)  H  06/16/18  16:30    


 


VBG pCO2  35.0  (40-60)  L  06/16/18  16:30    


 


VBG HCO3  25.5 mmol/l (21-28)   06/16/18  16:30    


 


VBG Total CO2  26.6 mmol.L (22-28)   06/16/18  16:30    


 


VBG O2 Sat (Calc)  100.0 % (40-65)  H  06/16/18  16:30    


 


VBG Base Excess  2.1 mmol/L (0.0-2.0)  H  06/16/18  16:30    


 


VBG Potassium  3.6 mmol/L (3.6-5.2)   06/16/18  16:30    


 


Sodium  140.0 mmol/L (132-148)   06/16/18  16:30    


 


Chloride  109.0 mmol/L ()  H  06/16/18  16:30    


 


Glucose  141 mg/dl ()  H  06/16/18  16:30    


 


Lactate  1.8 mmol/L (0.7-2.1)   06/16/18  16:30    


 


FiO2  21.0 %  06/16/18  16:30    


 


Sodium  142 mmol/L (132-148)   06/26/18  06:20    


 


Potassium  4.0 mmol/L (3.6-5.0)   06/26/18  06:20    


 


Chloride  103 mmol/L ()   06/26/18  06:20    


 


Carbon Dioxide  29 mmol/L (21-33)   06/26/18  06:20    


 


Anion Gap  15  (10-20)   06/26/18  06:20    


 


BUN  12 mg/dL (7-21)   06/26/18  06:20    


 


Creatinine  0.7 mg/dl (0.8-1.5)  L  06/26/18  06:20    


 


Est GFR ( Amer)  > 60   06/26/18  06:20    


 


Est GFR (Non-Af Amer)  > 60   06/26/18  06:20    


 


Random Glucose  119 mg/dL ()  H  06/26/18  06:20    


 


Calcium  8.6 mg/dL (8.4-10.5)   06/26/18  06:20    


 


Phosphorus  2.4 mg/dL (2.5-4.5)  L  06/16/18  16:30    


 


Magnesium  1.9 mg/dL (1.7-2.2)   06/16/18  16:30    


 


Total Bilirubin  0.2 mg/dL (0.2-1.3)   06/16/18  16:30    


 


AST  23 U/L (17-59)   06/16/18  16:30    


 


ALT  33 U/L (7-56)   06/16/18  16:30    


 


Alkaline Phosphatase  86 U/L ()   06/16/18  16:30    


 


C-React Prot High Sens  > 15.00 mg/L (1.00-3.00)  H  06/16/18  18:00    


 


Total Protein  6.8 g/dL (5.8-8.3)   06/16/18  16:30    


 


Albumin  3.9 g/dL (3.0-4.8)   06/16/18  16:30    


 


Globulin  2.9 gm/dL  06/16/18  16:30    


 


Albumin/Globulin Ratio  1.3  (1.1-1.8)   06/16/18  16:30    


 


Venous Blood Potassium  3.6 mmol/L (3.6-5.2)   06/16/18  16:30    


 


Urine Color  Yellow  (YELLOW)   06/16/18  17:05    


 


Urine Appearance  Clear  (CLEAR)   06/16/18  17:05    


 


Urine pH  7.0  (4.7-8.0)   06/16/18  17:05    


 


Ur Specific Gravity  <= 1.005  (1.005-1.035)   06/16/18  17:05    


 


Urine Protein  Negative mg/dL (<30 mg/dL)   06/16/18  17:05    


 


Urine Glucose (UA)  Negative mg/dL (NEGATIVE)   06/16/18  17:05    


 


Urine Ketones  Negative mg/dL (NEGATIVE)   06/16/18  17:05    


 


Urine Blood  Negative  (NEGATIVE)   06/16/18  17:05    


 


Urine Nitrate  Negative  (NEGATIVE)   06/16/18  17:05    


 


Urine Bilirubin  Negative  (NEGATIVE)   06/16/18  17:05    


 


Urine Urobilinogen  0.2 E.U./dL (<1 E.U./dL)   06/16/18  17:05    


 


Ur Leukocyte Esterase  Negative Crystal/uL (NEGATIVE)   06/16/18  17:05    


 


Vancomycin Trough  9.4 ug/mL (5.0-10.0)   06/23/18  08:40    


 


Urine Opiates Screen  Negative  (NEGATIVE)   06/16/18  17:05    


 


Urine Methadone Screen  Negative  (NEGATIVE)   06/16/18  17:05    


 


Ur Barbiturates Screen  Negative  (NEGATIVE)   06/16/18  17:05    


 


Valproic Acid  50 ug/mL (50.0-100.0)   06/19/18  05:45    


 


Ur Phencyclidine Scrn  Negative  (NEGATIVE)   06/16/18  17:05    


 


Ur Amphetamines Screen  Negative  (NEGATIVE)   06/16/18  17:05    


 


U Benzodiazepines Scrn  Negative  (NEGATIVE)   06/16/18  17:05    


 


U Oth Cocaine Metabols  Negative  (NEGATIVE)   06/16/18  17:05    


 


U Cannabinoids Screen  Negative  (NEGATIVE)   06/16/18  17:05    


 


Alcohol, Quantitative  < 10 mg/dL (0-10)   06/16/18  18:00    














Attending/Attestation





- Attestation


I have personally seen and examined this patient.: Yes


I have fully participated in the care of the patient.: Yes


I have reviewed all pertinent clinical information, including history, physical 

exam and plan: Yes


Notes (Text): 





06/28/18 15:29





Attending note;





Patient seen and examined with resident.





Patient is a 47 year old male with past medical history of bipolar, 

schizoaffective disorder, polysubstance abuse, and right leg compartment 

syndrome s/p fasciotomy presented with right leg cellulitis.  MRI was 

suggestive of osteomyelitis.


Right lower extremity cellulitis is resolved.


No redness.


Continue dressing per podiatry.


Currently on IV vancomycin and Rocephin. Needs to complete 4-6 weeks of 

antibiotics.


Follow-up vancomycin level.


s/p picc line placement.





Transfer to rehabilitation today.





Upon discharge the patient will follow-up with PMD .





06/28/18 15:30

## 2018-06-27 NOTE — CP.PCM.PN
<SteffenJefegashaneka - Last Filed: 06/27/18 10:05>





Subjective





- Date & Time of Evaluation


Date of Evaluation: 06/27/18


Time of Evaluation: 10:05





- Subjective


Subjective: 





Podiatry progress note for Dr. Hernandez/Dr. Albright





48 y/o male patient was seen and evaluated for right fifth digit ulcer. Patient 

is seen resting comfortably in bed. Patient is AAOx3. Patient denies any pain 

at this time. Patient denies any overnight acute events. Patient denies N/V/F/C/

SOB. Patient has no other pedal complains at this time 





Objective





- Vital Signs/Intake and Output


Vital Signs (last 24 hours): 


 











Temp Pulse Resp BP Pulse Ox


 


 98.0 F   90   20   139/98 H  97 


 


 06/27/18 06:00  06/27/18 06:00  06/27/18 06:00  06/27/18 06:00  06/27/18 06:00








Intake and Output: 


 











 06/27/18 06/27/18





 06:59 18:59


 


Intake Total 480 


 


Balance 480 














- Medications


Medications: 


 Current Medications





Acetaminophen (Tylenol 325mg Tab)  650 mg PO Q6H PRN


   PRN Reason: Pain, Mild (1-3)


Benztropine Mesylate (Cogentin)  [facility]


   Last Admin: 06/27/18 09:08 Dose:  1 mg


Divalproex Sodium (Depakote Dr(*Bid*))  500 mg PO [facility]


   Last Admin: 06/27/18 09:07 Dose:  500 mg


Docusate Sodium (Colace)  100 mg PO TID [facility]


   Last Admin: 06/27/18 09:08 Dose:  100 mg


Fluoxetine HCl (Prozac)  40 mg PO DAILY [facility]


   Last Admin: 06/27/18 09:07 Dose:  40 mg


Gabapentin (Neurontin)  600 mg PO QID [facility]


   PRN Reason: Protocol


   Last Admin: 06/27/18 09:07 Dose:  600 mg


Heparin Sodium (Porcine) (Heparin)  5,000 units SC Q12 [facility]


   PRN Reason: Protocol


   Last Admin: 06/27/18 09:08 Dose:  5,000 units


Hydrocortisone (Cortizone 1% Cream)  0 gm TOP Q12 [facility]


   Last Admin: 06/27/18 09:10 Dose:  1 applic


Ceftriaxone Sodium (Rocephin 2 Gm Ivpb)  2 gm in 100 mls @ 100 mls/hr IVPB 

DAILY [facility]


   PRN Reason: Protocol


   Stop: 07/18/18 14:57


   Last Admin: 06/27/18 09:09 Dose:  100 mls/hr


Vancomycin HCl 1.5 gm/ Sodium (Chloride)  500 mls @ 167 mls/hr IVPB Q12H HOME


   PRN Reason: Protocol


   Last Admin: 06/26/18 22:07 Dose:  167 mls/hr


Mupirocin (Bactroban Ointment)  10 gm TOP DAILY [facility]


   Last Admin: 06/27/18 09:10 Dose:  1 gm


Naproxen (Anaprox Ds)  550 mg PO BID [facility]


   Last Admin: 06/27/18 09:08 Dose:  550 mg


Oxycodone/Acetaminophen (Percocet 5/325 Mg Tab)  1 tab PO Q6H PRN


   PRN Reason: Pain, moderate (4-7)


   Stop: 06/29/18 11:21


   Last Admin: 06/27/18 06:22 Dose:  1 tab


Pantoprazole Sodium (Protonix Ec Tab)  40 mg PO 0600 [facility]


   Last Admin: 06/27/18 06:21 Dose:  40 mg


Polyethylene Glycol (Miralax)  17 gm PO DAILY [facility]


   Last Admin: 06/27/18 09:08 Dose:  17 gm


Quetiapine Fumarate 600 mg/ (Quetiapine Fumarate 100 mg)  700 mg PO 1700 [facility]


   Last Admin: 06/26/18 17:56 Dose:  700 mg


Tamsulosin HCl (Flomax)  0.4 mg PO DAILY [facility]


   Last Admin: 06/27/18 09:07 Dose:  0.4 mg











- Labs


Labs: 


 





 06/26/18 06:20 





 06/26/18 06:20 





 











APTT  30.5 Seconds (25.1-36.5)   06/17/18  07:00    














- Constitutional


Appears: Well, Non-toxic, No Acute Distress





- Extremities Exam


Additional comments: 





Right foot focused lower extremity exam





Derm: Superficial Ulcer with hyperkeratotic skin edges noted on the dorsal 

aspect of the right 5th digit, very minimal erythema on the dorsal aspect of 

the 5th digit which is localized to the area (decreased), no malodor, no active 

drainage, no purulence noted, no probe to bone 





Vascular: DP/PT pulses are palpable 2/4, CFT <3 secs x5, TG warm to cool 

proximal to distal, non pitting edema noted on the dorsum of the foot on the 

right 





Neuro: Protective sensation grossly intact





Ortho:minimal pain on palpation noted to the right fifth digit 








- Neurological Exam


Neurological Exam: Alert, Awake, Oriented x3





- Psychiatric Exam


Psychiatric exam: Normal Affect, Normal Mood





Assessment and Plan





- Assessment and Plan (Free Text)


Assessment: 





47 year old male with  right 5th digit ulcer and cellulitis


Plan: 





Patient seen and evaluated with attending Dr. Hernandez


Labs, vitals and charts reviewed; Afebrile, WBC @ 8.9 as of yesterday


Site cleaned using saline and dressing applied using bactroban, 4x4, tape


MRI of the right foot - suspicious for OM, clinically less likely 


Cultures taken - Coag negative staph


Continue IV abx as per ID - Ceftriaxone, Vancomycin


Stable from podiatry standpoint


Will continue to monitor patient while in-house; continue local wound care


Upon discharge, follow up with attending Dr. Albright/Dr. Hernandez at the wound 

care center





<Kevin Hernandez - Last Filed: 06/27/18 11:30>





Objective





- Vital Signs/Intake and Output


Vital Signs (last 24 hours): 


 











Temp Pulse Resp BP Pulse Ox


 


 98.0 F   90   20   139/98 H  97 


 


 06/27/18 06:00  06/27/18 06:00  06/27/18 06:00  06/27/18 06:00  06/27/18 06:00








Intake and Output: 


 











 06/27/18 06/27/18





 06:59 18:59


 


Intake Total 480 


 


Balance 480 














- Medications


Medications: 


 Current Medications





Acetaminophen (Tylenol 325mg Tab)  650 mg PO Q6H PRN


   PRN Reason: Pain, Mild (1-3)


Benztropine Mesylate (Cogentin)  1 mg PO [facility]


   Last Admin: 06/27/18 09:08 Dose:  1 mg


Divalproex Sodium (Depakote Dr(*Bid*))  500 mg PO [facility]


   Last Admin: 06/27/18 09:07 Dose:  500 mg


Docusate Sodium (Colace)  100 mg PO TID [facility]


   Last Admin: 06/27/18 09:08 Dose:  100 mg


Fluoxetine HCl (Prozac)  40 mg PO DAILY [facility]


   Last Admin: 06/27/18 09:07 Dose:  40 mg


Gabapentin (Neurontin)  600 mg PO QID [facility]


   PRN Reason: Protocol


   Last Admin: 06/27/18 09:07 Dose:  600 mg


Heparin Sodium (Porcine) (Heparin)  5,000 units SC Q12 HOME


   PRN Reason: Protocol


   Last Admin: 06/27/18 09:08 Dose:  5,000 units


Hydrocortisone (Cortizone 1% Cream)  0 gm TOP Q12 [facility]


   Last Admin: 06/27/18 09:10 Dose:  1 applic


Ceftriaxone Sodium (Rocephin 2 Gm Ivpb)  2 gm in 100 mls @ 100 mls/hr IVPB 

DAILY HOME


   PRN Reason: Protocol


   Stop: 07/18/18 14:57


   Last Admin: 06/27/18 09:09 Dose:  100 mls/hr


Vancomycin HCl 1.5 gm/ Sodium (Chloride)  500 mls @ 167 mls/hr IVPB Q12H HOME


   PRN Reason: Protocol


   Last Admin: 06/27/18 10:49 Dose:  167 mls/hr


Mupirocin (Bactroban Ointment)  10 gm TOP DAILY [facility]


   Last Admin: 06/27/18 09:10 Dose:  1 gm


Naproxen (Anaprox Ds)  550 mg PO BID [facility]


   Last Admin: 06/27/18 09:08 Dose:  550 mg


Oxycodone/Acetaminophen (Percocet 5/325 Mg Tab)  1 tab PO Q6H PRN


   PRN Reason: Pain, moderate (4-7)


   Stop: 06/29/18 11:21


   Last Admin: 06/27/18 06:22 Dose:  1 tab


Pantoprazole Sodium (Protonix Ec Tab)  40 mg PO 0600 [facility]


   Last Admin: 06/27/18 06:21 Dose:  40 mg


Polyethylene Glycol (Miralax)  17 gm PO DAILY [facility]


   Last Admin: 06/27/18 09:08 Dose:  17 gm


Quetiapine Fumarate 600 mg/ (Quetiapine Fumarate 100 mg)  700 mg PO 1700 [facility]


   Last Admin: 06/26/18 17:56 Dose:  700 mg


Tamsulosin HCl (Flomax)  0.4 mg PO DAILY [facility]


   Last Admin: 06/27/18 09:07 Dose:  0.4 mg











- Labs


Labs: 


 





 06/26/18 06:20 





 06/26/18 06:20 





 











APTT  30.5 Seconds (25.1-36.5)   06/17/18  07:00    














Attending/Attestation





- Attestation


I have personally seen and examined this patient.: Yes


I have fully participated in the care of the patient.: Yes


I have reviewed all pertinent clinical information, including history, physical 

exam and plan: Yes

## 2018-06-27 NOTE — PN
DATE:  06/26/2018



SUBJECTIVE:  The patient was seen earlier this morning in room 373, bed 1,

in no acute distress, nontoxic.



PHYSICAL EXAMINATION:

VITAL SIGNS:  Temperature is 98, blood pressure is 130/70, respiratory rate

18.

HEENT:  Unremarkable.

NECK:  Supple.

LUNGS:  Decreased breath sounds.

HEART:  Normal S1 and S2.

ABDOMEN:  Soft.



LABORATORY DATA:  Reveals a white count of 8.9, hemoglobin of 12.  BUN of

12, creatinine of 0.7.  Urinalysis is noted.  Microbiology is reviewed. 

Stool culture have coag-negative Staph.



MEDICATIONS:  Patient is on vancomycin and ceftriaxone.



ASSESSMENT AND PLAN:  A 47-year-old male with a right foot cellulitis in

the fifth toe, osteomyelitis and schizoaffective disorder, bipolar,

anxiety, depression.  On vancomycin and ceftriaxone.  We will recommend 4

to 6 weeks of antibiotics with a weekly vancomycin trough level, CBC,

SMA-18, sed rate, C-reactive protein, today is day #10 and vancomycin

trough not corrected _____ .





__________________________________________

Jason Lopez MD



DD:  06/26/2018 19:58:03

DT:  06/26/2018 22:13:06

Job # [id number]

## 2018-06-27 NOTE — CP.PCM.PN
<Lou Diamond - Last Filed: 06/27/18 11:36>





Subjective





- Date & Time of Evaluation


Date of Evaluation: 06/27/18


Time of Evaluation: 08:48





- Subjective


Subjective: 





Internal Medicine Progress Note - Hospitalist Service





Patient seen and examined at bedside. Per nursing no acute events overnight. 

Patient is doing well, states that the facial rash has improved. Ambulating and 

tolerating diet. Offers no complaints at this time. Denies headaches, dizziness

, cp, palpitations, sob, abdominal pain, urinary symptoms, changes in bowel 

habits. 





Objective





- Vital Signs/Intake and Output


Vital Signs (last 24 hours): 


 











Temp Pulse Resp BP Pulse Ox


 


 98.0 F   90   20   139/98 H  97 


 


 06/27/18 06:00  06/27/18 06:00  06/27/18 06:00  06/27/18 06:00  06/27/18 06:00








Intake and Output: 


 











 06/27/18 06/27/18





 06:59 18:59


 


Intake Total 480 


 


Balance 480 














- Medications


Medications: 


 Current Medications





Acetaminophen (Tylenol 325mg Tab)  650 mg PO Q6H PRN


   PRN Reason: Pain, Mild (1-3)


Benztropine Mesylate (Cogentin)  1 mg PO [facility]


   Last Admin: 06/26/18 22:06 Dose:  1 mg


Divalproex Sodium (Depakote Dr(*Bid*))  500 mg PO [facility]


   Last Admin: 06/26/18 22:06 Dose:  500 mg


Docusate Sodium (Colace)  100 mg PO TID [facility]


   Last Admin: 06/26/18 17:57 Dose:  100 mg


Fluoxetine HCl (Prozac)  40 mg PO DAILY [facility]


   Last Admin: 06/26/18 10:36 Dose:  40 mg


Gabapentin (Neurontin)  600 mg PO QID [facility]


   PRN Reason: Protocol


   Last Admin: 06/26/18 22:06 Dose:  600 mg


Heparin Sodium (Porcine) (Heparin)  5,000 units SC Q12 [facility]


   PRN Reason: Protocol


   Last Admin: 06/26/18 22:07 Dose:  5,000 units


Hydrocortisone (Cortizone 1% Cream)  0 gm TOP Q12 [facility]


   Last Admin: 06/26/18 22:08 Dose:  1 applic


Ceftriaxone Sodium (Rocephin 2 Gm Ivpb)  2 gm in 100 mls @ 100 mls/hr IVPB 

DAILY [facility]


   PRN Reason: Protocol


   Stop: 07/18/18 14:57


   Last Admin: 06/26/18 10:35 Dose:  100 mls/hr


Vancomycin HCl 1.5 gm/ Sodium (Chloride)  500 mls @ 167 mls/hr IVPB Q12H [facility]


   PRN Reason: Protocol


   Last Admin: 06/26/18 22:07 Dose:  167 mls/hr


Mupirocin (Bactroban Ointment)  10 gm TOP DAILY [facility]


   Last Admin: 06/26/18 10:39 Dose:  1 gm


Naproxen (Anaprox Ds)  550 mg PO BID [facility]


   Last Admin: 06/26/18 17:57 Dose:  550 mg


Oxycodone/Acetaminophen (Percocet 5/325 Mg Tab)  1 tab PO Q6H PRN


   PRN Reason: Pain, moderate (4-7)


   Stop: 06/29/18 11:21


   Last Admin: 06/27/18 06:22 Dose:  1 tab


Pantoprazole Sodium (Protonix Ec Tab)  40 mg PO 0600 [facility]


   Last Admin: 06/27/18 06:21 Dose:  40 mg


Polyethylene Glycol (Miralax)  17 gm PO DAILY [facility]


   Last Admin: 06/26/18 10:37 Dose:  17 gm


Quetiapine Fumarate 600 mg/ (Quetiapine Fumarate 100 mg)  700 mg PO 1700 [facility]


   Last Admin: 06/26/18 17:56 Dose:  700 mg


Tamsulosin HCl (Flomax)  0.4 mg PO DAILY [facility]


   Last Admin: 06/26/18 10:36 Dose:  0.4 mg











- Labs


Labs: 


 





 06/26/18 06:20 





 06/26/18 06:20 





 











APTT  30.5 Seconds (25.1-36.5)   06/17/18  07:00    














- Constitutional


Appears: Well, No Acute Distress





- Head Exam


Head Exam: ATRAUMATIC, NORMAL INSPECTION, NORMOCEPHALIC





- Eye Exam


Eye Exam: EOMI, Normal appearance





- ENT Exam


ENT Exam: Mucous Membranes Moist





- Neck Exam


Neck Exam: Full ROM





- Respiratory Exam


Respiratory Exam: Clear to Ausculation Bilateral, NORMAL BREATHING PATTERN.  

absent: Rales, Rhonchi, Wheezes





- Cardiovascular Exam


Cardiovascular Exam: REGULAR RHYTHM, +S1, +S2





- GI/Abdominal Exam


GI & Abdominal Exam: Soft, Normal Bowel Sounds.  absent: Guarding, Rigid, 

Tenderness





- Extremities Exam


Extremities Exam: Full ROM


Additional comments: 





Right foot dressing intact





- Back Exam


Back Exam: NORMAL INSPECTION





- Neurological Exam


Neurological Exam: Alert, Awake, Normal Gait, Oriented x3





- Psychiatric Exam


Psychiatric exam: Normal Affect, Normal Mood





- Skin


Skin Exam: Dry, Normal Color, Warm





Assessment and Plan





- Assessment and Plan (Free Text)


Assessment: 





A/P: Patient is a 48 yo M with PMH bipolar disorder, schizoaffective disorder, 

polysubstance abuse, and right leg compartment syndrome s/p fasciotomy, 

presents for right leg cellulitis 2/2 trauma, also found to have tachycardia:





Right 5th phalangeal Osteomyelitis/Right Lower extremity Cellulitis:


-Stable, afebrile, no leukocytosis


-CRP elevated, ESR elevated


-X ray foot: soft tissue swelling without acute articular or osseous 

abnormality. No osteomyelitis


-MRI foot: soft tissue swelling of dorsum and 5th digit, edema is identified 

within the fused mid and distal 5th phalanges suspcious of osteomyelitis, mild 

soft tissue swelling of the 1st and 2nd digits also suspicious of osteomyelitis


-Antibiotics: Vancomycin 1.5gm Q12H, Rocephin 2gm daily (when clear for 

discharge, can D/C with IV Daptomycin)


-Weekly Vanco trough, no need for daily labs at this time


-Wound culture 6/20 shows light growth of coag neg staph; repeat wound culture 6 /21 showing no growth


-Lower extremity US: normal VALORIE/PVR


-Pain control: Percocet 5 mg q6h PRN, Naproxen 550mg PO BID


-Colace 100mg PO TID and Miralax 17gm daily for constipation


-ID on consult, help appreciated


-Podiatry on consult, help appreciated


-Physical Therapy: recommends home 


-PICC line inserted in right arm for long term IV antibiotics use





Tachycardia (resolved)


-Currently asymptomatic


-Echocardiogram showed mild concentric left ventricular hypertrophy, EF 45%, 

trace MR and TR





Face rash 


-Clotrimazole to be applied daily


-Hydrocortisone cream 





Hx of BPH: 


-Continue flomax





Hx of Mood disorder, schizoaffective disorder 


-Continue Seroquel and Fluoxetine


-Continue Depakote and Cogentin


-Psych on consult, help appreciated





Hx of Neuropathy: 


-Continue with Neurontin 600mg QID





Insomnia


-Ambien PRN





History of substance abuse


-UTOX on admission negative


-Cessation advised





DVT/GI prophylaxis: 


-Heparin 5000 Q12 SC 


-Protonix 40mg PO daily





DISPO: Patient will follow up with PMD, Dr Hanson upon discharge. Discussed 

case with Podiatry, surgery unlikely at this time. Patient requires long term 

IV antibiotics. Case management following, awaiting to hear back from [facility]. Plan discussed with Dr Jeffery Diamond DO PGY-1








<Quinten Gil - Last Filed: 06/27/18 14:06>





Objective





- Vital Signs/Intake and Output


Vital Signs (last 24 hours): 


 











Temp Pulse Resp BP Pulse Ox


 


 98.0 F   90   20   139/98 H  97 


 


 06/27/18 06:00  06/27/18 06:00  06/27/18 06:00  06/27/18 06:00  06/27/18 06:00








Intake and Output: 


 











 06/27/18 06/27/18





 06:59 18:59


 


Intake Total 480 


 


Balance 480 














- Medications


Medications: 


 Current Medications





Acetaminophen (Tylenol 325mg Tab)  650 mg PO Q6H PRN


   PRN Reason: Pain, Mild (1-3)


Benztropine Mesylate (Cogentin)  1 mg PO [facility]


   Last Admin: 06/27/18 09:08 Dose:  1 mg


Divalproex Sodium (Depakote Dr(*Bid*))  500 mg PO [facility]


   Last Admin: 06/27/18 09:07 Dose:  500 mg


Docusate Sodium (Colace)  100 mg PO TID [facility]


   Last Admin: 06/27/18 12:59 Dose:  100 mg


Fluoxetine HCl (Prozac)  40 mg PO DAILY [facility]


   Last Admin: 06/27/18 09:07 Dose:  40 mg


Gabapentin (Neurontin)  600 mg PO QID [facility]


   PRN Reason: Protocol


   Last Admin: 06/27/18 12:59 Dose:  600 mg


Heparin Sodium (Porcine) (Heparin)  5,000 units SC Q12 [facility]


   PRN Reason: Protocol


   Last Admin: 06/27/18 09:08 Dose:  5,000 units


Hydrocortisone (Cortizone 1% Cream)  0 gm TOP Q12 [facility]


   Last Admin: 06/27/18 09:10 Dose:  1 applic


Ceftriaxone Sodium (Rocephin 2 Gm Ivpb)  2 gm in 100 mls @ 100 mls/hr IVPB 

DAILY [facility]


   PRN Reason: Protocol


   Stop: 07/18/18 14:57


   Last Admin: 06/27/18 09:09 Dose:  100 mls/hr


Vancomycin HCl 1.5 gm/ Sodium (Chloride)  500 mls @ 167 mls/hr IVPB Q12H HOME


   PRN Reason: Protocol


   Last Admin: 06/27/18 10:49 Dose:  167 mls/hr


Mupirocin (Bactroban Ointment)  10 gm TOP DAILY [facility]


   Last Admin: 06/27/18 09:10 Dose:  1 gm


Naproxen (Anaprox Ds)  550 mg PO BID [facility]


   Last Admin: 06/27/18 09:08 Dose:  550 mg


Oxycodone/Acetaminophen (Percocet 5/325 Mg Tab)  1 tab PO Q6H PRN


   PRN Reason: Pain, moderate (4-7)


   Stop: 06/29/18 11:21


   Last Admin: 06/27/18 06:22 Dose:  1 tab


Pantoprazole Sodium (Protonix Ec Tab)  40 mg PO 0600 [facility]


   Last Admin: 06/27/18 06:21 Dose:  40 mg


Polyethylene Glycol (Miralax)  17 gm PO DAILY [facility]


   Last Admin: 06/27/18 09:08 Dose:  17 gm


Quetiapine Fumarate 600 mg/ (Quetiapine Fumarate 100 mg)  700 mg PO 1700 [facility]


   Last Admin: 06/26/18 17:56 Dose:  700 mg


Tamsulosin HCl (Flomax)  0.4 mg PO DAILY [facility]


   Last Admin: 06/27/18 09:07 Dose:  0.4 mg











- Labs


Labs: 


 





 06/26/18 06:20 





 06/26/18 06:20 





 











APTT  30.5 Seconds (25.1-36.5)   06/17/18  07:00    














Attending/Attestation





- Attestation


I have personally seen and examined this patient.: Yes


I have fully participated in the care of the patient.: Yes


I have reviewed all pertinent clinical information, including history, physical 

exam and plan: Yes


Notes (Text): 





06/27/18 14:05





Attending note;





Patient seen and examined with resident.





Patient is a 47 year old male with past medical history of bipolar, 

schizoaffective disorder, polysubstance abuse, and right leg compartment 

syndrome s/p fasciotomy presented with right leg cellulitis.  MRI was 

suggestive of osteomyelitis.


Right lower extremity cellulitis is resolved.


No redness.


Continue dressing per podiatry.


Currently on IV vancomycin and Rocephin. Needs to complete 4-6 weeks of 

antibiotics.


Follow-up vancomycin level.


s/p picc line placement.





Case discussed with  for discharge planning. Pending insurance 

authorization.





Upon discharge the patient will follow-up with PMD .

## 2018-08-05 ENCOUNTER — HOSPITAL ENCOUNTER (EMERGENCY)
Dept: HOSPITAL 42 - ED | Age: 48
Discharge: HOME | End: 2018-08-05
Payer: MEDICAID

## 2018-08-05 VITALS — DIASTOLIC BLOOD PRESSURE: 79 MMHG | SYSTOLIC BLOOD PRESSURE: 123 MMHG | HEART RATE: 77 BPM | TEMPERATURE: 98.6 F

## 2018-08-05 VITALS — OXYGEN SATURATION: 98 %

## 2018-08-05 VITALS — RESPIRATION RATE: 18 BRPM

## 2018-08-05 VITALS — BODY MASS INDEX: 30.7 KG/M2

## 2018-08-05 DIAGNOSIS — F39: ICD-10-CM

## 2018-08-05 DIAGNOSIS — F25.9: ICD-10-CM

## 2018-08-05 DIAGNOSIS — F32.9: Primary | ICD-10-CM

## 2018-08-05 DIAGNOSIS — F17.210: ICD-10-CM

## 2018-08-05 LAB
ALBUMIN SERPL-MCNC: 4.7 G/DL (ref 3–4.8)
ALBUMIN/GLOB SERPL: 1.3 {RATIO} (ref 1.1–1.8)
ALT SERPL-CCNC: 46 U/L (ref 7–56)
APAP SERPL-MCNC: < 10 UG/ML (ref 10–20)
APPEARANCE UR: CLEAR
AST SERPL-CCNC: 36 U/L (ref 17–59)
BASOPHILS # BLD AUTO: 0.02 K/MM3 (ref 0–2)
BASOPHILS NFR BLD: 0.2 % (ref 0–3)
BILIRUB UR-MCNC: NEGATIVE MG/DL
BUN SERPL-MCNC: 14 MG/DL (ref 7–21)
CALCIUM SERPL-MCNC: 9.5 MG/DL (ref 8.4–10.5)
COLOR UR: YELLOW
EOSINOPHIL # BLD: 0.1 10*3/UL (ref 0–0.7)
EOSINOPHIL NFR BLD: 1.7 % (ref 1.5–5)
ERYTHROCYTE [DISTWIDTH] IN BLOOD BY AUTOMATED COUNT: 12.4 % (ref 11.5–14.5)
GFR NON-AFRICAN AMERICAN: > 60
GLUCOSE UR STRIP-MCNC: NEGATIVE MG/DL
GRANULOCYTES # BLD: 5.95 10*3/UL (ref 1.4–6.5)
GRANULOCYTES NFR BLD: 71.9 % (ref 50–68)
HGB BLD-MCNC: 14.6 G/DL (ref 14–18)
LEUKOCYTE ESTERASE UR-ACNC: NEGATIVE LEU/UL
LYMPHOCYTES # BLD: 1.6 10*3/UL (ref 1.2–3.4)
LYMPHOCYTES NFR BLD AUTO: 19.8 % (ref 22–35)
MCH RBC QN AUTO: 29.6 PG (ref 25–35)
MCHC RBC AUTO-ENTMCNC: 34.7 G/DL (ref 31–37)
MCV RBC AUTO: 85.4 FL (ref 80–105)
MONOCYTES # BLD AUTO: 0.5 10*3/UL (ref 0.1–0.6)
MONOCYTES NFR BLD: 6.4 % (ref 1–6)
PH UR STRIP: 6.5 [PH] (ref 4.7–8)
PLATELET # BLD: 192 10^3/UL (ref 120–450)
PMV BLD AUTO: 10.1 FL (ref 7–11)
PROT UR STRIP-MCNC: NEGATIVE MG/DL
RBC # BLD AUTO: 4.93 10^6/UL (ref 3.5–6.1)
RBC # UR STRIP: NEGATIVE /UL
SALICYLATES SERPL-MCNC: < 1 MG/DL (ref 2–20)
SP GR UR STRIP: <= 1.005 (ref 1–1.03)
TROPONIN I SERPL-MCNC: < 0.01 NG/ML
UROBILINOGEN UR STRIP-ACNC: 0.2 E.U./DL
WBC # BLD AUTO: 8.3 10^3/UL (ref 4.5–11)

## 2018-08-05 NOTE — CT
Date of service: 



08/05/2018



PROCEDURE:  CT HEAD WITHOUT CONTRAST.



HISTORY:

seizure



COMPARISON:

None available.



TECHNIQUE:

Axial computed tomography images were obtained through the head/brain 

without intravenous contrast.  



Radiation dose:



Total exam DLP = 890 mGy-cm.



This CT exam was performed using one or more of the following dose 

reduction techniques: Automated exposure control, adjustment of the 

mA and/or kV according to patient size, and/or use of iterative 

reconstruction technique.



FINDINGS:



HEMORRHAGE:

No intracranial hemorrhage. 



BRAIN:

No mass effect or edema.  No atrophy or chronic microvascular 

ischemic changes.



VENTRICLES:

Unremarkable. No hydrocephalus. 



CALVARIUM:

Unremarkable.



PARANASAL SINUSES:

Unremarkable as visualized. No significant inflammatory changes.



MASTOID AIR CELLS:

Unremarkable as visualized. No inflammatory changes.



OTHER FINDINGS:

None.



IMPRESSION:

No acute intracranial findings

## 2018-08-05 NOTE — RAD
Date of service: 



08/05/2018



HISTORY:

seizure  



COMPARISON:

06/03/2018 



FINDINGS:



LUNGS:

No active pulmonary disease.



PLEURA:

No significant pleural effusion identified, no pneumothorax apparent.



CARDIOVASCULAR:

Normal.



OSSEOUS STRUCTURES:

No significant abnormalities.



VISUALIZED UPPER ABDOMEN:

Normal.



OTHER FINDINGS:

None.



IMPRESSION:

No active disease.

## 2018-08-05 NOTE — RAD
Date of service: 



08/05/2018



PROCEDURE:  Right Ankle Radiographs.



HISTORY:

trauma  



COMPARISON:

None



FINDINGS:



BONES:

Normal. No fracture. 



JOINTS:

Normal. No osteoarthritis. Ankle mortise maintained. Talar dome intact



SOFT TISSUES:

Normal. 



OTHER FINDINGS:

None.



IMPRESSION:

Normal right ankle radiographs.

## 2018-08-05 NOTE — ED PDOC
Arrival/HPI





- General


Chief Complaint: Trauma


Time Seen by Provider: 08/05/18 11:17


Historian: Patient





- History of Present Illness


Narrative History of Present Illness (Text): 


08/05/18 11:18


A 47 year old male, whose past medical history includes bipolar disorder, 

schizoaffective disorder, polysubstance abuse, and hx right leg compartment 

syndrome s/p fasciotomy, presents to the emergency department complaining of 

shakiness. Patient reports he fell in the bathroom at home this morning and 

states he was shaky, believing he may have had a seizure. There were no 

witnesses. Patient was discharged from psychiatric unit 5 weeks ago for 

depression. States experiencing depression at this time. Patient denies any 

suicidal/homicidal ideation, or any other complaints at this time. Also, 

patient mentions he smokes 1 pack/daily and stopped EtOH/substance abuse couple 

months ago.





No PMD








Past Medical History





- Provider Review


Nursing Documentation Reviewed: Yes





- Past History


Past History: No Previous





- Infectious Disease


Hx of Infectious Diseases: None





- Tetanus Immunization


Tetanus Immunization: Unknown





- Past Medical History


Past Medical History: No Previous





- Cardiac


Hx Pacemaker: No





- Pulmonary


Hx Respiratory Disorders: No





- Neurological


Hx Neurological Disorder: No





- HEENT


Hx HEENT Disorder: No





- Renal


Hx Renal Disorder: No





- Endocrine/Metabolic


Hx Endocrine Disorders: No





- Hematological/Oncological


Hx Cancer: No





- Integumentary


Hx Dermatological Disorder: Yes (right leg swelling/ cellulitis)





- Musculoskeletal/Rheumatological


Hx Musculoskeletal Disorders: Yes


Hx Falls: Yes


Hx Fractures: Yes


Other/Comment: Brace to right leg, Hx. fasciectomy.





- Gastrointestinal


Hx Gastrointestinal Disorders: No





- Genitourinary/Gynecological


Hx Genitourinary Disorders: No





- Psychiatric


Hx Psychophysiologic Disorder: Yes


Hx Anxiety: Yes


Hx Bipolar Disorder: Yes


Hx Depression: Yes


Hx Substance Use: Yes (extasis used 6/8/18)


Other/Comment: alcohol user, substance abuse user (quit 2wks ago)





- Past Surgical History


Past Surgical History: Non-Contributing





- Surgical History


Hx Mastectomy: No





- Anesthesia


Hx Anesthesia: Yes


Hx Anesthesia Reactions: No


Hx Malignant Hyperthermia: No





- Suicidal Assessment


Feels Threatened In Home Enviroment: No





Family/Social History





- Physician Review


Nursing Documentation Reviewed: Yes


Family/Social History: No Known Family HX


Smoking Status: Heavy Smoker > 10 Cigarettes Daily


Hx Alcohol Use: Yes (occasionally)


Hx Substance Use: Yes (extasis used 6/8/18)


Substance used: HEROIN, COCAINE, OXY


Hx Substance Use Treatment: No





Allergies/Home Meds


Allergies/Adverse Reactions: 


Allergies





ct dye Allergy (Uncoded 06/04/18 10:22)


 ITCHING


 AS PER PATIENT  








Home Medications: 


 Home Meds











 Medication  Instructions  Recorded  Confirmed


 


Hydroxyzine Pamoate [Vistaril] 50 mg PO PRN 08/05/18 08/05/18


 


QUEtiapine [Seroquel XR] 700 mg PO 1700 08/05/18 08/05/18


 


Risperidone [Risperidone Odt] 0.5 mg PO BID 08/05/18 08/05/18














Review of Systems





- Physician Review


All systems were reviewed & negative as marked: Yes





- Review of Systems


Constitutional: absent: Fevers, Night Sweats


Respiratory: absent: SOB


Cardiovascular: absent: Chest Pain, Palpitations


Gastrointestinal: absent: Abdominal Pain, Diarrhea, Nausea, Vomiting


Neurological: Seizure (possible seizure according to patinet), Other (shakiness)

.  absent: Headache, Dizziness





Physical Exam


Vital Signs Reviewed: Yes


Vital Signs











  Temp Pulse Resp BP Pulse Ox


 


 08/05/18 14:30  98.2 F  96 H  18  139/80  96


 


 08/05/18 11:47  98.7 F    


 


 08/05/18 11:19  98.4 F  106 H  18  128/88  95











Temperature: Afebrile


Blood Pressure: Normal


Pulse: Regular


Respiratory Rate: Normal


Appearance: Positive for: Well-Appearing, Non-Toxic, Comfortable, Other (

patient is tremulous)


Pain Distress: None


Mental Status: Positive for: Alert and Oriented X 3





- Systems Exam


Head: Present: Atraumatic, Normocephalic


Pupils: Present: PERRL


Extroacular Muscles: Present: EOMI


Conjunctiva: Present: Normal


Respiratory/Chest: Present: Clear to Auscultation, Good Air Exchange.  No: 

Respiratory Distress, Accessory Muscle Use


Cardiovascular: Present: Regular Rate and Rhythm, Normal S1, S2.  No: Murmurs


Abdomen: No: Tenderness, Distention, Peritoneal Signs


Upper Extremity: Present: Normal Inspection.  No: Cyanosis, Edema


Lower Extremity: Present: Other (right lower leg severe atrophy.)


Neurological: Present: GCS=15, CN II-XII Intact, Speech Normal, Other (tremulous

)


Skin: Present: Warm, Dry, Normal Color.  No: Rashes


Psychiatric: Present: Alert, Oriented x 3, Normal Insight, Normal Concentration





Medical Decision Making


ED Course and Treatment: 


08/05/18 11:22


Impression: 47 year old male with shakiness. Physical exam shows patient is 

tremulous; right lower leg has a severe atrophy.





Plan:


-- EKG


-- Chest X-ray


-- Head CT


-- Labs


-- Urinalysis


-- Reassess and disposition





Prior Visits:


Notes and results from previous visits were reviewed. Patient was last seen in 

the emergency department on 06/16/2018 for right 5th toe swelling and pain. 

Patient was admitted.





Progress Notes:











08/05/18 13:30


Sister arrived who witnessed the event. She describes the patient standing up 

over a laundry basket and shaking violently both his upper and lower 

extremities. He was awake and alert through the incident asking what is wrong 

with him. She is requesting x-rays of his atrophied right ankle. The family is 

here: mother, sister and aunt and they are requesting consultation with Dr. Rodriguez. Patient was recently discharged from the psychiatric floor. He is 

requesting medication to help calm him down. Crisis has been called.


08/05/18 13:46


EKG shows normal sinus rhythm rate approximately 105 with no acute ST or T-wave 

changes


08/05/18 15:50


Patient is waiting for crisis evaluation.


08/05/18 17:31


Seen and evaluated by crisis who will discharge home.





- Lab Interpretations


Lab Results: 








 08/05/18 12:30 





 08/05/18 12:30 





 Lab Results





08/05/18 14:35: Ammonia 17


08/05/18 13:30: Urine Opiates Screen Negative, Urine Methadone Screen Negative, 

Ur Barbiturates Screen Negative, Ur Phencyclidine Scrn Negative, Ur 

Amphetamines Screen Negative, U Benzodiazepines Scrn Negative, U Oth Cocaine 

Metabols Negative, U Cannabinoids Screen Negative


08/05/18 13:30: Urine Color Yellow, Urine Appearance Clear, Urine pH 6.5, Ur 

Specific Gravity <= 1.005, Urine Protein Negative, Urine Glucose (UA) Negative, 

Urine Ketones Negative, Urine Blood Negative, Urine Nitrate Negative, Urine 

Bilirubin Negative, Urine Urobilinogen 0.2, Ur Leukocyte Esterase Negative


08/05/18 12:30: Salicylates < 1 L, Acetaminophen < 10.0 L


08/05/18 12:30: TSH 3rd Generation 1.22, Alcohol, Quantitative < 10


08/05/18 12:30: Sodium 143, Potassium 4.7, Chloride 105, Carbon Dioxide 22, 

Anion Gap 20, BUN 14, Creatinine 0.8, Est GFR (African Amer) > 60, Est GFR (Non-

Af Amer) > 60, Random Glucose 114 H, Calcium 9.5, Magnesium 2.0, Total 

Bilirubin 0.5, AST 36, ALT 46, Alkaline Phosphatase 105, Lactate Dehydrogenase 

543, Total Creatine Kinase 154, Troponin I < 0.01, Total Protein 8.2, Albumin 

4.7, Globulin 3.5, Albumin/Globulin Ratio 1.3


08/05/18 12:30: WBC 8.3, RBC 4.93, Hgb 14.6  D, Hct 42.1, MCV 85.4  D, MCH 29.6

, MCHC 34.7, RDW 12.4, Plt Count 192, MPV 10.1, Gran % 71.9 H, Lymph % (Auto) 

19.8 L, Mono % (Auto) 6.4 H, Eos % (Auto) 1.7, Baso % (Auto) 0.2, Gran # 5.95, 

Lymph # (Auto) 1.6, Mono # (Auto) 0.5, Eos # (Auto) 0.1, Baso # (Auto) 0.02











- RAD Interpretation


Radiology Orders: 








08/05/18 11:22


HEAD W/O CONTRAST [CT] Stat 





08/05/18 11:23


CHEST PORTABLE [RAD] Stat 





08/05/18 13:29


ANKLE RIGHT 3 VIEWS ROUTINE [RAD] Stat 








Right ankle as read by the radiologist shows no fracture or dislocation.


Chest one view as read by the radiologist shows no acute findings.


CT scan of the head as read by the radiologist shows no acute findings.


: Radiologist





- Scribe Statement


The provider has reviewed the documentation as recorded by the Vincenzo Whiteside





Provider Scribe Attestation:


All medical record entries made by the Scribe were at my direction and 

personally dictated by me. I have reviewed the chart and agree that the record 

accurately reflects my personal performance of the history, physical exam, 

medical decision making, and the department course for this patient. I have 

also personally directed, reviewed, and agree with the discharge instructions 

and disposition.








Disposition/Present on Arrival





- Present on Arrival


Any Indicators Present on Arrival: No


History of DVT/PE: No


History of Uncontrolled Diabetes: No


Urinary Catheter: No


History of Decub. Ulcer: No


History Surgical Site Infection Following: None





- Disposition


Have Diagnosis and Disposition been Completed?: Yes


Diagnosis: 


 Mood disorder, Depression





Disposition: HOME/ ROUTINE


Disposition Time: 17:39


Patient Plan: Discharge


Condition: GOOD


Discharge Instructions (ExitCare):  Depression


Forms:  [employer] (English)

## 2018-08-06 NOTE — CARD
--------------- APPROVED REPORT --------------





Date of service: 08/05/2018



EKG Measurement

Heart [id number]

[id number]

[id number]

[id number]

[id number]



<Conclusion>

*** Poor data quality, interpretation may be adversely affected

Sinus tachycardia

Otherwise normal ECG

## 2018-08-11 ENCOUNTER — HOSPITAL ENCOUNTER (OUTPATIENT)
Dept: HOSPITAL 42 - ED | Age: 48
Setting detail: OBSERVATION
LOS: 3 days | Discharge: PSYCHIATRIC OR SUBSTANCE USE FACILITY | End: 2018-08-14
Attending: FAMILY MEDICINE | Admitting: INTERNAL MEDICINE
Payer: MEDICAID

## 2018-08-11 VITALS — BODY MASS INDEX: 30.7 KG/M2

## 2018-08-11 DIAGNOSIS — M86.8X7: ICD-10-CM

## 2018-08-11 DIAGNOSIS — Z91.14: ICD-10-CM

## 2018-08-11 DIAGNOSIS — F41.9: ICD-10-CM

## 2018-08-11 DIAGNOSIS — F17.210: ICD-10-CM

## 2018-08-11 DIAGNOSIS — F25.9: ICD-10-CM

## 2018-08-11 DIAGNOSIS — R41.82: ICD-10-CM

## 2018-08-11 DIAGNOSIS — D72.829: ICD-10-CM

## 2018-08-11 DIAGNOSIS — Z79.2: ICD-10-CM

## 2018-08-11 DIAGNOSIS — Z91.041: ICD-10-CM

## 2018-08-11 DIAGNOSIS — Z79.899: ICD-10-CM

## 2018-08-11 DIAGNOSIS — F31.9: ICD-10-CM

## 2018-08-11 DIAGNOSIS — F19.10: Primary | ICD-10-CM

## 2018-08-11 LAB
ERYTHROCYTE [DISTWIDTH] IN BLOOD BY AUTOMATED COUNT: 12.1 % (ref 11.5–14.5)
HGB BLD-MCNC: 14.9 G/DL (ref 14–18)
MCH RBC QN AUTO: 29.3 PG (ref 25–35)
MCHC RBC AUTO-ENTMCNC: 35.1 G/DL (ref 31–37)
MCV RBC AUTO: 83.5 FL (ref 80–105)
PLATELET # BLD: 163 10^3/UL (ref 120–450)
PMV BLD AUTO: 9.8 FL (ref 7–11)
RBC # BLD AUTO: 5.08 10^6/UL (ref 3.5–6.1)
WBC # BLD AUTO: 13.9 10^3/UL (ref 4.5–11)

## 2018-08-11 PROCEDURE — 80358 DRUG SCREENING METHADONE: CPT

## 2018-08-11 PROCEDURE — 87040 BLOOD CULTURE FOR BACTERIA: CPT

## 2018-08-11 PROCEDURE — 82803 BLOOD GASES ANY COMBINATION: CPT

## 2018-08-11 PROCEDURE — 86140 C-REACTIVE PROTEIN: CPT

## 2018-08-11 PROCEDURE — 85027 COMPLETE CBC AUTOMATED: CPT

## 2018-08-11 PROCEDURE — 84484 ASSAY OF TROPONIN QUANT: CPT

## 2018-08-11 PROCEDURE — 80345 DRUG SCREENING BARBITURATES: CPT

## 2018-08-11 PROCEDURE — 80164 ASSAY DIPROPYLACETIC ACD TOT: CPT

## 2018-08-11 PROCEDURE — 80329 ANALGESICS NON-OPIOID 1 OR 2: CPT

## 2018-08-11 PROCEDURE — 80320 DRUG SCREEN QUANTALCOHOLS: CPT

## 2018-08-11 PROCEDURE — 80361 OPIATES 1 OR MORE: CPT

## 2018-08-11 PROCEDURE — 83615 LACTATE (LD) (LDH) ENZYME: CPT

## 2018-08-11 PROCEDURE — 80346 BENZODIAZEPINES1-12: CPT

## 2018-08-11 PROCEDURE — 82948 REAGENT STRIP/BLOOD GLUCOSE: CPT

## 2018-08-11 PROCEDURE — 80353 DRUG SCREENING COCAINE: CPT

## 2018-08-11 PROCEDURE — 90791 PSYCH DIAGNOSTIC EVALUATION: CPT

## 2018-08-11 PROCEDURE — 81001 URINALYSIS AUTO W/SCOPE: CPT

## 2018-08-11 PROCEDURE — 97116 GAIT TRAINING THERAPY: CPT

## 2018-08-11 PROCEDURE — 84145 PROCALCITONIN (PCT): CPT

## 2018-08-11 PROCEDURE — 71045 X-RAY EXAM CHEST 1 VIEW: CPT

## 2018-08-11 PROCEDURE — 83992 ASSAY FOR PHENCYCLIDINE: CPT

## 2018-08-11 PROCEDURE — 87086 URINE CULTURE/COLONY COUNT: CPT

## 2018-08-11 PROCEDURE — 80053 COMPREHEN METABOLIC PANEL: CPT

## 2018-08-11 PROCEDURE — 93005 ELECTROCARDIOGRAM TRACING: CPT

## 2018-08-11 PROCEDURE — 99285 EMERGENCY DEPT VISIT HI MDM: CPT

## 2018-08-11 PROCEDURE — 80349 CANNABINOIDS NATURAL: CPT

## 2018-08-11 PROCEDURE — 97161 PT EVAL LOW COMPLEX 20 MIN: CPT

## 2018-08-11 PROCEDURE — 36415 COLL VENOUS BLD VENIPUNCTURE: CPT

## 2018-08-11 PROCEDURE — 82550 ASSAY OF CK (CPK): CPT

## 2018-08-11 PROCEDURE — 73630 X-RAY EXAM OF FOOT: CPT

## 2018-08-11 PROCEDURE — 70450 CT HEAD/BRAIN W/O DYE: CPT

## 2018-08-11 PROCEDURE — 80324 DRUG SCREEN AMPHETAMINES 1/2: CPT
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
## 2018-08-12 LAB
ALBUMIN SERPL-MCNC: 4.6 G/DL (ref 3–4.8)
ALBUMIN/GLOB SERPL: 1.4 {RATIO} (ref 1.1–1.8)
ALT SERPL-CCNC: 37 U/L (ref 7–56)
APAP SERPL-MCNC: < 10 UG/ML (ref 10–20)
APPEARANCE UR: CLEAR
AST SERPL-CCNC: 40 U/L (ref 17–59)
BASE EXCESS BLDV CALC-SCNC: 3.3 MMOL/L (ref 0–2)
BILIRUB UR-MCNC: NEGATIVE MG/DL
BUN SERPL-MCNC: 15 MG/DL (ref 7–21)
CALCIUM SERPL-MCNC: 9.7 MG/DL (ref 8.4–10.5)
COLOR UR: YELLOW
EPI CELLS #/AREA URNS HPF: (no result) /HPF (ref 0–5)
GFR NON-AFRICAN AMERICAN: > 60
GLUCOSE UR STRIP-MCNC: NEGATIVE MG/DL
LEUKOCYTE ESTERASE UR-ACNC: NEGATIVE LEU/UL
PH BLDV: 7.44 [PH] (ref 7.32–7.43)
PH UR STRIP: 6 [PH] (ref 4.7–8)
PROT UR STRIP-MCNC: (no result) MG/DL
RBC # UR STRIP: NEGATIVE /UL
RBC #/AREA URNS HPF: (no result) /HPF (ref 0–2)
SALICYLATES SERPL-MCNC: < 1 MG/DL (ref 2–20)
SP GR UR STRIP: >= 1.03 (ref 1–1.03)
TROPONIN I SERPL-MCNC: < 0.01 NG/ML
UROBILINOGEN UR STRIP-ACNC: 0.2 E.U./DL
VENOUS BLOOD FIO2: 21 %
VENOUS BLOOD GAS PCO2: 41 (ref 40–60)
VENOUS BLOOD GAS PO2: 207 MM/HG (ref 30–55)
WBC #/AREA URNS HPF: (no result) /HPF (ref 0–6)

## 2018-08-12 RX ADMIN — DIVALPROEX SODIUM SCH: 500 TABLET, DELAYED RELEASE ORAL at 21:16

## 2018-08-12 RX ADMIN — ENOXAPARIN SODIUM SCH MG: 40 INJECTION SUBCUTANEOUS at 10:14

## 2018-08-12 NOTE — CARD
--------------- APPROVED REPORT --------------





Date of service: 08/11/2018



EKG Measurement

Heart [id number]

IA [id number]

[id number]

[id number]

[id number]



<Conclusion>

 arm lead reversal,

Sinus tachycardia

Low voltage in the limb leads

Right axis deviation

Pulmonary disease pattern

Abnormal ECG

## 2018-08-12 NOTE — CP.PCM.CON
History of Present Illness





- History of Present Illness


History of Present Illness: 


47 year old male with PMH of Right foot cellulitis with right 5th toe 

osteomyelitis, schizoaffective disorder, bipolar disorder, anxiety and 

depression, history of right leg fasciotomy, polysubstance abuse was brought in 

to [facility] because of confusion. He was apparently been taking his psych meds which 

were adjusted but he was still taking the same doses prior to the adjustment. 

He had completed his 4-6 weeks of IV antibiotics for right foot osteomyelitis 

in a rehab facility. The patient is currently awake and alert and oriented, 

complaining of mild swelling of his right foot, denies fever or chills, no 

nausea or vomiting, no chest pain, no SOB, no headache or dizziness, no 

abdominal pain, no diarrhea, no dysuria, no cough or colds. He denies animal 

contacts, denies soaking his feet in water. Infectious Diseases consult is 

requested to further evaluate and manage.





Review of Systems





- Review of Systems


All systems: reviewed and no additional remarkable complaints except (as per HPI

)





Past Patient History





- Infectious Disease


Hx of Infectious Diseases: None





- Tetanus Immunizations


Tetanus Immunization: Unknown





- Past Medical History & Family History


Past Medical History?: Yes





- Past Social History


Smoking Status: Heavy Smoker > 10 Cigarettes Daily





- CARDIAC


Hx Pacemaker: No





- PULMONARY


Hx Respiratory Disorders: No





- NEUROLOGICAL


Hx Neurological Disorder: No





- HEENT


Hx HEENT Problems: No





- RENAL


Hx Chronic Kidney Disease: No





- ENDOCRINE/METABOLIC


Hx Endocrine Disorders: No





- HEMATOLOGICAL/ONCOLOGICAL


Hx Cancer: No





- INTEGUMENTARY


Hx Dermatological Problems: Yes (right leg swelling/ cellulitis)





- MUSCULOSKELETAL/RHEUMATOLOGICAL


Hx Musculoskeletal Disorders: Yes


Hx Falls: Yes


Hx Fractures: Yes


Other/Comment: Brace to right leg, Hx. fasciectomy.





- GASTROINTESTINAL


Hx Gastrointestinal Disorders: No





- GENITOURINARY/GYNECOLOGICAL


Hx Genitourinary Disorders: No





- PSYCHIATRIC


Hx Psychophysiologic Disorder: Yes


Hx Anxiety: Yes


Hx Bipolar Disorder: Yes


Hx Depression: Yes


Hx Substance Use: Yes (extasis used 6/8/18)


Other/Comment: alcohol user, substance abuse user (quit 2wks ago)





- SURGICAL HISTORY


Hx Mastectomy: No





- ANESTHESIA


Hx Anesthesia: Yes


Hx Anesthesia Reactions: No


Hx Malignant Hyperthermia: No





Meds


Allergies/Adverse Reactions: 


 Allergies











Allergy/AdvReac Type Severity Reaction Status Date / Time


 


Iodinated Contrast- Oral and Allergy  ITCHING Verified 08/11/18 22:20





IV Dye     














- Medications


Medications: 


 Current Medications





Enoxaparin Sodium (Lovenox)  40 mg SC DAILY [facility]


   PRN Reason: Protocol


Pantoprazole Sodium (Protonix Ec Tab)  40 mg PO 0600 [facility]











Physical Exam





- Constitutional


Appears: Chronically Ill





- Head Exam


Head Exam: NORMAL INSPECTION





- ENT Exam


ENT Exam: Mucous Membranes Moist





- Neck Exam


Neck exam: Negative for: Lymphadenopathy, Meningismus





- Respiratory Exam


Respiratory Exam: Decreased Breath Sounds





- Cardiovascular Exam


Cardiovascular Exam: +S1, +S2





- GI/Abdominal Exam


GI & Abdominal Exam: Soft.  absent: Tenderness





- Extremities Exam


Additional comments: 





right foot with mild swelling but no tenderness, no open wounds; atrophied calf 

muscle on the right





Results





- Vital Signs


Recent Vital Signs: 


 Last Vital Signs











Temp  98.8 F   08/12/18 06:45


 


Pulse  97 H  08/12/18 06:45


 


Resp  18   08/12/18 06:45


 


BP  137/81   08/12/18 06:45


 


Pulse Ox  97   08/12/18 06:45














- Labs


Result Diagrams: 


 08/11/18 23:34





 08/11/18 23:34





Assessment & Plan





- Assessment and Plan (Free Text)


Plan: 





Assessment


history of right foot cellulitis with right 1st, 2nd and 5th toe osteomyelitis, 

S/P treatment with IV antibiotics


schizoaffective disorder


bipolar disorder


anxiety and depression


history of right leg fasciotomy





Plan


will monitor off antibiotics, check foot xray, check ESR, CRP and will 

recommend Podiatry consult 


explained to patient that he needs follow up with Podiatry 


will monitor clinically

## 2018-08-12 NOTE — RAD
Date of service: 



08/12/2018



PROCEDURE:  Three views of the right foot performed through sock 

artifact which obscures fine soft tissue and bone detail. 



HISTORY:

Rule out osteomyelitis  



COMPARISON:

None.



FINDINGS:



BONES:

No evidence of acute displaced fracture nor dislocation. The osseous 

structures intact so far as can be seen.  No definitive cortical 

destructive changes however given overlying sock related artifact, 

the possibility of early osteomyelitis not excluded.  Consider 

followup MRI of the foot. 



JOINTS:

Mild multi articular degenerative osteoarthritis 



SOFT TISSUES:

Normal. 



OTHER FINDINGS:

None.



IMPRESSION:

Limited study due to overlying clothing/sock related artifact which 

obscures fine soft tissue and bone detail. No evidence of acute 

displaced fracture nor dislocation.  No obvious cortical destructive 

changes however the evaluation for early osteomyelitis limited.  

Recommend followup MRI if early osteomyelitis suspected clinically. 



Multi articular degenerative osteoarthritis.

## 2018-08-12 NOTE — CP.PCM.HP
History of Present Illness





- History of Present Illness


History of Present Illness: 





Adalid Calderon D.O. PGY-1, HISTORY & PHYSICAL NOTE FOR HOSPITALIST TEAM





CC: Altered mental status, shaking





47M with pmhx of bipolar disorder, schizoaffective disorder, polysubstance abuse

, multiple psychiatric admissions, R LE compartment syndrome s/p fasciotomy, 

osteomyelitis presented to ED after being bib EMS for altered mental status and 

confusion. Pt recently admitted for osteomyelitis of RLE on 6/16/18 and treated 

with IV abx. PICC line was placed for long term abx. During that hospitalization

, pt complained of slurred speech after taking his psych meds, at which time 

psychiatry had adjusted his medications. Pt was discharged to [facility] to continue IV abx for a total of 6 weeks. While at rehab facility, pt 

was followed by psychiatry to adjust psychiatric medications. Medication 

changes included seroquel dose decrease and initiation of cogentin, depakote 

and prozac as per Dr. Lopez's note. As per sister present at bedside providing 

most of history, the psychiatric medications were increased while at the rehab 

facility. She attributes these changes in dosages to a change in pt's mental 

status, which include shaking, confusion and disorientation.  She also states 

that pt had been inappropriately continuing previous medications given to him 

by PMD and previous psychiatric admissions which he was no longer supposed to 

be taking. Sister states that he had been taking large amounts of hydroxyzine 

that he was no longer prescribed and had been taking his grandmothers 

medications. Upon interview, pt was labile and tangential with his thought 

processes and still complained of mild tremors. He also endorsed dysuria and 

polyuria. He was AxOx 3 and conversant. He denies fevers, chills, nausea, 

vomiting, headache, dizziness, chest pain, shortness of breath, constipation, 

diarrhea. 





PMD: Dr. Hanson


Surgical history: denies


Allergies: Contrast dye


Family history: cancer on mother's side and mental disease on father's side


Social history: smokes 1/2 ppd for >20 years, denies alcohol or drug 

consumption   





Present on Admission





- Present on Admission


Any Indicators Present on Admission: No





Review of Systems





- Review of Systems


All systems: reviewed and no additional remarkable complaints except (as per HPI

)





Past Patient History





- Infectious Disease


Hx of Infectious Diseases: None





- Tetanus Immunizations


Tetanus Immunization: Unknown





- Past Medical History & Family History


Past Medical History?: Yes





- Past Social History


Smoking Status: Heavy Smoker > 10 Cigarettes Daily





- CARDIAC


Hx Pacemaker: No





- PULMONARY


Hx Respiratory Disorders: No





- NEUROLOGICAL


Hx Neurological Disorder: No





- HEENT


Hx HEENT Problems: No





- RENAL


Hx Chronic Kidney Disease: No





- ENDOCRINE/METABOLIC


Hx Endocrine Disorders: No





- HEMATOLOGICAL/ONCOLOGICAL


Hx Cancer: No





- INTEGUMENTARY


Hx Dermatological Problems: Yes (right leg swelling/ cellulitis)





- MUSCULOSKELETAL/RHEUMATOLOGICAL


Hx Musculoskeletal Disorders: Yes


Hx Falls: Yes


Hx Fractures: Yes


Other/Comment: Brace to right leg, Hx. fasciectomy.





- GASTROINTESTINAL


Hx Gastrointestinal Disorders: No





- GENITOURINARY/GYNECOLOGICAL


Hx Genitourinary Disorders: No





- PSYCHIATRIC


Hx Psychophysiologic Disorder: Yes


Hx Anxiety: Yes


Hx Bipolar Disorder: Yes


Hx Depression: Yes


Hx Substance Use: Yes (extasis used 6/8/18)


Other/Comment: alcohol user, substance abuse user (quit 2wks ago)





- SURGICAL HISTORY


Hx Mastectomy: No





- ANESTHESIA


Hx Anesthesia: Yes


Hx Anesthesia Reactions: No


Hx Malignant Hyperthermia: No





Meds


Home Medications: 


 Home Medication List











 Medication  Instructions  Recorded  Confirmed  Type


 


Acetaminophen [Tylenol 325mg tab] 650 mg PO Q6H PRN  tab 08/14/18  Rx


 


Aspirin [Aspirin Chewable] 81 mg PO DAILY  chew 08/14/18  Rx


 


Haloperidol [Haldol] 5 mg PO Q6 PRN  tab 08/14/18  Rx


 


Ibuprofen [Motrin Tab] 400 mg PO Q6H PRN  tab 08/14/18  Rx


 


Mupirocin 2% Ointment [Bactroban 0 gm TOP BID  tube 08/14/18  Rx





Ointment]    


 


QUEtiapine [Seroquel XR] 300 mg PO DAILY  ter 08/14/18  Rx











Allergies/Adverse Reactions: 


 Allergies











Allergy/AdvReac Type Severity Reaction Status Date / Time


 


Iodinated Contrast- Oral and Allergy  ITCHING Verified 08/14/18 23:22





IV Dye     














Physical Exam





- Constitutional


Appears: Well, Non-toxic, No Acute Distress





- Head Exam


Head Exam: ATRAUMATIC, NORMAL INSPECTION





- Eye Exam


Eye Exam: EOMI, Normal appearance





- ENT Exam


ENT Exam: Mucous Membranes Moist, Normal Exam





- Neck Exam


Neck exam: Positive for: Normal Inspection.  Negative for: Meningismus





- Respiratory Exam


Respiratory Exam: Clear to Auscultation Bilateral, NORMAL BREATHING PATTERN





- Cardiovascular Exam


Cardiovascular Exam: REGULAR RHYTHM, +S1, +S2





- GI/Abdominal Exam


GI & Abdominal Exam: Hypoactive Bowel Sounds, Soft





- Extremities Exam


Extremities exam: Positive for: normal inspection.  Negative for: calf 

tenderness


Additional comments: 





RLE atrophic





- Neurological Exam


Neurological exam: Alert, Oriented x3





- Psychiatric Exam


Psychiatric exam: Normal Affect, Normal Mood





- Skin


Skin Exam: Dry, Intact, Warm





Results





- Vital Signs


Recent Vital Signs: 





 Last Vital Signs











Temp  98.8 F   08/12/18 06:45


 


Pulse  97 H  08/12/18 06:45


 


Resp  18   08/12/18 06:45


 


BP  137/81   08/12/18 06:45


 


Pulse Ox  97   08/12/18 06:45














- Labs


Result Diagrams: 


 08/14/18 06:45





 08/11/18 23:34





Assessment & Plan





- Assessment and Plan (Free Text)


Assessment: 





47M with pmhx of bipolar disorder, schizoaffective disorder, polysubstance abuse

, multiple psychiatric admissions, R LE compartment syndrome s/p fasciotomy 

admitted for AMS. Pt recently discharged 6/27/18 after being diagnosed and 

treated for osteomyelitis of RLE. Pt was discharged to [facility] where 

he was followed by psychiatry and medication changes were made (see Dr. Lopez 

note 6/23/18). Pt admitted today for altered mental status and leukocytosis.   


Plan: 





Altered Mental Status


- Likely secondary to psychiatric medication noncompliance vs leukocytosis


- Start empiric abx


- F/u Psychiatry recs


- Consult neurology: Dr. Krishnan 


- Aspiration precautions


- High fall risk


- Maintain HOB 30degrees


- Seizure precautions





Leukocytosis


- Pt afebrile, vitals stable


- Start rocephin/ceftriaxone for empiric coverage


- ID Consult: Dr. Lopez


- f/u foot xray


- f/u urine culture


- f/u procalcitonin, esr





Hx of Mood disorder, schizoaffective disorder, bipolar disorder


- Consult psychiatry: Dr. Lopez for medication reconciliation


- Continue meds per psych recs





History of substance abuse


- UDS negative


- Cessation advised





DVT/GI prophylaxis: Lovenox/Protonix





Case seen and discussed with attending physician, Dr. Ulrich

## 2018-08-12 NOTE — RAD
Date of service: 



08/11/2018



HISTORY:

AMS.



COMPARISON:

Comparison chest dated 8/ 05/18 



FINDINGS:



LUNGS:

Poor inspiration with low lung volumes, crowded bronchovascular 

markings and mild bibasilar atelectasis.



PLEURA:

No significant pleural effusion identified, no pneumothorax apparent.



CARDIOVASCULAR:

Normal.



OSSEOUS STRUCTURES:

No significant abnormalities.



VISUALIZED UPPER ABDOMEN:

Normal.



OTHER FINDINGS:

None.



IMPRESSION:





Poor inspiration with low lung volumes, crowded bronchovascular 

markings and mild bibasilar atelectasis.

## 2018-08-12 NOTE — CT
Date of service: 



08/11/2018



PROCEDURE:  CT HEAD WITHOUT CONTRAST.



HISTORY:

AMS



COMPARISON:

Comparison made with prior CT scan brain dated 08/05/2018



TECHNIQUE:

Axial computed tomography images were obtained through the head/brain 

without intravenous contrast.  



Radiation dose:



Total exam DLP = 1040.36mGy-cm.



This CT exam was performed using one or more of the following dose 

reduction techniques: Automated exposure control, adjustment of the 

mA and/or kV according to patient size, and/or use of iterative 

reconstruction technique.



FINDINGS:



HEMORRHAGE:

No acute parenchymal, subarachnoid or extra-axial hemorrhage. 



BRAIN:

Suspect minimal chronic periventricular white matter ischemic 

changes. 



Mild generalized volume loss 



VENTRICLES:

No obstructive hydrocephalus. 



CALVARIUM:

Unremarkable.



PARANASAL SINUSES:

Unremarkable as visualized. No significant inflammatory changes.



MASTOID AIR CELLS:

Unremarkable as visualized. No inflammatory changes.



OTHER FINDINGS:

None.



IMPRESSION:

 No acute intracranial hemorrhage.







Suspect minimal chronic periventricular white matter ischemic 

changes. 



Mild generalized volume loss

## 2018-08-12 NOTE — CP.PCM.CON
History of Present Illness





- History of Present Illness


History of Present Illness: 


 Neurology Consultation Note:





Mr. Garcia is a 47-year-old man with a past medical history that is 

significant for bipolar disorder, schizoaffective disorder, polysubstance abuse

, multiple psychiatric admissions, right leg compartment syndrome requiring 

fasciotomy with subsequent osteomyelitis requiring IV antibiotics through PICC 

line that was placed during previous admission of 6/16/18.  There were some 

issues with his psychiatric medications during which he was apparently taking 

high doses of hydroxyzine, seroquel, and cogentin.  He is also on Depakote and 

Gabapentin.  Apparently, during previous hospitalizations, he was becoming 

confused and having episodes of shaking after the medications.  Currently, the 

patient is conversant and does not have any complaints.  Labs showed elevated 

WBC, but he has been afebrile.  Vital signs are otherwise normal.  CT scan of 

the head did not show any significant findings. 








Review of Systems





- Review of Systems


All systems: reviewed and no additional remarkable complaints except





Past Patient History





- Infectious Disease


Hx of Infectious Diseases: None





- Tetanus Immunizations


Tetanus Immunization: Unknown





- Past Medical History & Family History


Past Medical History?: Yes





- Past Social History


Smoking Status: Heavy Smoker > 10 Cigarettes Daily





- CARDIAC


Hx Pacemaker: No





- PULMONARY


Hx Respiratory Disorders: No





- NEUROLOGICAL


Hx Neurological Disorder: No





- HEENT


Hx HEENT Problems: No





- RENAL


Hx Chronic Kidney Disease: No





- ENDOCRINE/METABOLIC


Hx Endocrine Disorders: No





- HEMATOLOGICAL/ONCOLOGICAL


Hx Cancer: No





- INTEGUMENTARY


Hx Dermatological Problems: Yes (right leg swelling/ cellulitis)





- MUSCULOSKELETAL/RHEUMATOLOGICAL


Hx Musculoskeletal Disorders: Yes


Hx Falls: Yes


Hx Fractures: Yes


Other/Comment: Brace to right leg, Hx. fasciectomy.





- GASTROINTESTINAL


Hx Gastrointestinal Disorders: No





- GENITOURINARY/GYNECOLOGICAL


Hx Genitourinary Disorders: No





- PSYCHIATRIC


Hx Psychophysiologic Disorder: Yes


Hx Anxiety: Yes


Hx Bipolar Disorder: Yes


Hx Depression: Yes


Hx Substance Use: Yes (extasis used 6/8/18)


Other/Comment: alcohol user, substance abuse user (quit 2wks ago)





- SURGICAL HISTORY


Hx Mastectomy: No





- ANESTHESIA


Hx Anesthesia: Yes


Hx Anesthesia Reactions: No


Hx Malignant Hyperthermia: No





Meds


Allergies/Adverse Reactions: 


 Allergies











Allergy/AdvReac Type Severity Reaction Status Date / Time


 


Iodinated Contrast- Oral and Allergy  ITCHING Verified 08/11/18 22:20





IV Dye     














- Medications


Medications: 


 Current Medications





Acetaminophen (Tylenol 325mg Tab)  650 mg PO Q4H PRN


   PRN Reason: Pain, Mild (1-3)


Benztropine Mesylate (Cogentin)  1 mg PO [facility]


Divalproex Sodium (Depakote Dr(*Bid*))  500 mg PO [facility]


Enoxaparin Sodium (Lovenox)  40 mg SC DAILY [facility]


   PRN Reason: Protocol


   Last Admin: 08/12/18 10:14 Dose:  40 mg


Fluoxetine HCl (Prozac)  40 mg PO DAILY [facility]


   Last Admin: 08/12/18 13:15 Dose:  40 mg


Gabapentin (Neurontin)  600 mg PO QID [facility]


   PRN Reason: Protocol


   Last Admin: 08/12/18 17:25 Dose:  600 mg


Hydroxyzine Pamoate (Vistaril)  25 mg PO BID PRN; Protocol


   PRN Reason: Anxiety


   Last Admin: 08/12/18 16:28 Dose:  25 mg


Ibuprofen (Motrin Tab)  400 mg PO Q6H PRN


   PRN Reason: Pain, moderate (4-7)


Pantoprazole Sodium (Protonix Ec Tab)  40 mg PO 0600 [facility]


Risperidone (Risperdal Tab)  0.5 mg PO DAILY [facility]


   Last Admin: 08/12/18 13:15 Dose:  0.5 mg











Physical Exam





- Neurological Exam


Neurological exam: Alert, CN II-XII Intact, Normal Gait, Oriented x3, Reflexes 

Normal





Results





- Vital Signs


Recent Vital Signs: 


 Last Vital Signs











Temp  98.7 F   08/12/18 16:28


 


Pulse  88   08/12/18 16:28


 


Resp  20   08/12/18 16:28


 


BP  129/74   08/12/18 16:28


 


Pulse Ox  94 L  08/12/18 16:28














- Labs


Result Diagrams: 


 08/11/18 23:34





 08/11/18 23:34


Labs: 


 Laboratory Results - last 24 hr











  08/12/18





  08:00


 


C-Reactive Protein  32.00 H














Assessment & Plan


(1) Altered mental status


Assessment and Plan: 


This is essentially resolved and could be multifactorial: sepsis, osteomyelitis

, toxic-metabolic encephalopathy, polypharmacy, etc.  It does not appear to be 

an underlying neurological disorder.  I recommend continuing Depakote and 

Neurontin to prevent seizures in the setting of the current antipsychotic 

medications that can lower the seizure threshold.  Consider an EEG and an MRI 

if symptoms return and persist.





Thank you. 


Status: Acute   Priority: Medium

## 2018-08-13 LAB
ERYTHROCYTE [DISTWIDTH] IN BLOOD BY AUTOMATED COUNT: 12.1 % (ref 11.5–14.5)
HGB BLD-MCNC: 14.9 G/DL (ref 14–18)
MCH RBC QN AUTO: 28.5 PG (ref 25–35)
MCHC RBC AUTO-ENTMCNC: 33.6 G/DL (ref 31–37)
MCV RBC AUTO: 84.9 FL (ref 80–105)
PLATELET # BLD: 150 10^3/UL (ref 120–450)
PMV BLD AUTO: 10.1 FL (ref 7–11)
RBC # BLD AUTO: 5.23 10^6/UL (ref 3.5–6.1)
WBC # BLD AUTO: 8.1 10^3/UL (ref 4.5–11)

## 2018-08-13 RX ADMIN — DIVALPROEX SODIUM SCH MG: 500 TABLET, DELAYED RELEASE ORAL at 09:06

## 2018-08-13 RX ADMIN — ENOXAPARIN SODIUM SCH MG: 40 INJECTION SUBCUTANEOUS at 09:07

## 2018-08-13 RX ADMIN — DIVALPROEX SODIUM SCH MG: 500 TABLET, DELAYED RELEASE ORAL at 22:41

## 2018-08-13 RX ADMIN — PANTOPRAZOLE SODIUM SCH: 40 TABLET, DELAYED RELEASE ORAL at 05:24

## 2018-08-13 NOTE — CP.PCM.CON
<Senia Bourne - Last Filed: 08/13/18 12:22>





History of Present Illness





- History of Present Illness


History of Present Illness: 





Podiatry Consult note: Dr. Hernandez





47 year old male, very well known to the service with PMHx of bipolar disorder, 

schizoaffective disorder, polysubstance abuse, multiple psychiatric admissions, 

R LE compartment syndrome s/p fasciotomy, osteomyelitis was evaluated for right 

foot wound. Patient was admitted to the hospital after being brought by EMS for 

altered mental status and confusion. Patient does not respond to the questions 

asked properly at the time of the visit. Patient was recently discharged from 

the hospital after completing long term IV abx. Patient denies of any pain to 

his bilateral LE. Patient appears in no acute distress at the time of the 

visit. Denies of recent F/N/V/C/SOB/CP/headache. Denies of any pedal complains 

at this time. 





PMHx: bipolar disorder, schizoaffective disorder, polysubstance abuse, multiple 

psychiatric admissions, R LE compartment syndrome s/p fasciotomy, osteomyelitis


PSHx: Denies


Allergies: Iodinated contrast


SHx: smokes 1/2 ppd for >20 years, denies alcohol or drug consumption  








Review of Systems





- Constitutional


Constitutional: As Per HPI





Past Patient History





- Infectious Disease


Hx of Infectious Diseases: None





- Tetanus Immunizations


Tetanus Immunization: Unknown





- Past Medical History & Family History


Past Medical History?: Yes





- Past Social History


Smoking Status: Heavy Smoker > 10 Cigarettes Daily





- CARDIAC


Hx Pacemaker: No





- PULMONARY


Hx Respiratory Disorders: No





- NEUROLOGICAL


Hx Neurological Disorder: No





- HEENT


Hx HEENT Problems: No





- RENAL


Hx Chronic Kidney Disease: No





- ENDOCRINE/METABOLIC


Hx Endocrine Disorders: No





- HEMATOLOGICAL/ONCOLOGICAL


Hx Cancer: No





- INTEGUMENTARY


Hx Dermatological Problems: Yes (right leg swelling/ cellulitis)





- MUSCULOSKELETAL/RHEUMATOLOGICAL


Hx Musculoskeletal Disorders: Yes


Hx Falls: Yes


Hx Fractures: Yes


Other/Comment: Brace to right leg, Hx. fasciectomy.





- GASTROINTESTINAL


Hx Gastrointestinal Disorders: No





- GENITOURINARY/GYNECOLOGICAL


Hx Genitourinary Disorders: No





- PSYCHIATRIC


Hx Psychophysiologic Disorder: Yes


Hx Anxiety: Yes


Hx Bipolar Disorder: Yes


Hx Depression: Yes


Hx Substance Use: Yes (extasis used 6/8/18)


Other/Comment: alcohol user, substance abuse user (quit 2wks ago)





- SURGICAL HISTORY


Hx Mastectomy: No





- ANESTHESIA


Hx Anesthesia: Yes


Hx Anesthesia Reactions: No


Hx Malignant Hyperthermia: No





Meds


Allergies/Adverse Reactions: 


 Allergies











Allergy/AdvReac Type Severity Reaction Status Date / Time


 


Iodinated Contrast- Oral and Allergy  ITCHING Verified 08/11/18 22:20





IV Dye     














- Medications


Medications: 


 Current Medications





Acetaminophen (Tylenol 325mg Tab)  650 mg PO Q4H PRN


   PRN Reason: Pain, Mild (1-3)


Benztropine Mesylate (Cogentin)  1 mg PO [facility]


   Last Admin: 08/13/18 09:06 Dose:  1 mg


Divalproex Sodium (Depakote Dr(*Bid*))  [facility]


   Last Admin: 08/13/18 09:06 Dose:  500 mg


Enoxaparin Sodium (Lovenox)  40 mg SC DAILY [facility]


   PRN Reason: Protocol


   Last Admin: 08/13/18 09:07 Dose:  40 mg


Fluoxetine HCl (Prozac)  40 mg PO DAILY [facility]


   Last Admin: 08/13/18 09:08 Dose:  40 mg


Gabapentin (Neurontin)  600 mg PO QID [facility]


   PRN Reason: Protocol


   Last Admin: 08/13/18 09:07 Dose:  600 mg


Hydroxyzine Pamoate (Vistaril)  25 mg PO BID PRN; Protocol


   PRN Reason: Anxiety


   Last Admin: 08/12/18 16:28 Dose:  25 mg


Ibuprofen (Motrin Tab)  400 mg PO Q6H PRN


   PRN Reason: Pain, moderate (4-7)


Lorazepam (Ativan)  1 mg IVP Q6H PRN; Protocol


   PRN Reason: Anxiety


   Last Admin: 08/13/18 08:06 Dose:  1 mg


Pantoprazole Sodium (Protonix Ec Tab)  40 mg PO 0600 [facility]


   Last Admin: 08/13/18 05:24 Dose:  Not Given


Risperidone (Risperdal Tab)  0.5 mg PO DAILY [facility]


   Last Admin: 08/13/18 09:08 Dose:  0.5 mg











Physical Exam





- Constitutional


Appears: Well, Non-toxic, No Acute Distress





- Extremities Exam


Additional comments: 





Right foot focused lower extremity exam





Vascular: DP/PT pulses are palpable 2/4, CFT <3 secs x5, Temp gradient warm to 

cool proximal to distal, no pitting or non-pitting edema noted





Derm: Superficial Ulcer with thin skin edges noted on the dorsal aspect of the 

right 5th metatarsal head, wound base is mainly grannular with no fibrosis or 

active drainage, no tunneling or undermining, no malodor, no purulence noted, 

no probe to bone, no clinical suspicion of active infectious process at this 

time





Neuro: Protective sensation grossly intact





Ortho: no pain on palpation at the ulcer, no pain on ROM of the 5th digit





- Neurological Exam


Neurological exam: Alert, Oriented x3





- Psychiatric Exam


Psychiatric exam: Normal Affect, Normal Mood





Results





- Vital Signs


Recent Vital Signs: 


 Last Vital Signs











Temp  98.7 F   08/12/18 16:28


 


Pulse  80   08/13/18 07:54


 


Resp  20   08/13/18 07:54


 


BP  146/91 H  08/13/18 07:54


 


Pulse Ox  96   08/13/18 07:54














- Labs


Result Diagrams: 


 08/13/18 06:30





 08/11/18 23:34


Labs: 


 Laboratory Results - last 24 hr











  08/12/18 08/13/18





  08:00 06:30


 


WBC   8.1  D


 


RBC   5.23


 


Hgb   14.9


 


Hct   44.4


 


MCV   84.9


 


MCH   28.5


 


MCHC   33.6


 


RDW   12.1


 


Plt Count   150


 


MPV   10.1


 


C-Reactive Protein  32.00 H 














Assessment & Plan





- Assessment and Plan (Free Text)


Assessment: 





47 year old male with right dorso-lateral foot wound; clinically non-infected (

Thrasher 1)


Plan: 





Patient seen and evaluated with attending Dr. Hernandez


Labs, vitals and charts reviewed; Afebrile, absent leukocytosis


Site cleaned using saline and dressing applied using optifoam


Right foot X-rays - no evidence of OM 


Wound appears clinical non-infected at this time - no intervention needed at 

this time


Podiatry plans no surgical intervention at this time


Stable from podiatry standpoint


Will continue to monitor patient while in-house; continue local wound care


Upon discharge, follow up with attending Dr. Albright/Dr. Hernandez at the wound 

care center








- Date & Time


Date: 08/13/18


Time: 12:31





<Kevin Hernandez - Last Filed: 08/13/18 12:39>





Meds





- Medications


Medications: 


 Current Medications





Acetaminophen (Tylenol 325mg Tab)  650 mg PO Q4H PRN


   PRN Reason: Pain, Mild (1-3)


Benztropine Mesylate (Cogentin)  1 mg PO [facility]


   Last Admin: 08/13/18 09:06 Dose:  1 mg


Divalproex Sodium (Depakote Dr(*Bid*))  500 mg PO [facility]


   Last Admin: 08/13/18 09:06 Dose:  500 mg


Enoxaparin Sodium (Lovenox)  40 mg SC DAILY [facility]


   PRN Reason: Protocol


   Last Admin: 08/13/18 09:07 Dose:  40 mg


Fluoxetine HCl (Prozac)  40 mg PO DAILY [facility]


   Last Admin: 08/13/18 09:08 Dose:  40 mg


Gabapentin (Neurontin)  600 mg PO QID [facility]


   PRN Reason: Protocol


   Last Admin: 08/13/18 09:07 Dose:  600 mg


Hydroxyzine Pamoate (Vistaril)  25 mg PO BID PRN; Protocol


   PRN Reason: Anxiety


   Last Admin: 08/12/18 16:28 Dose:  25 mg


Ibuprofen (Motrin Tab)  400 mg PO Q6H PRN


   PRN Reason: Pain, moderate (4-7)


Lorazepam (Ativan)  1 mg IVP Q6H PRN; Protocol


   PRN Reason: Anxiety


   Last Admin: 08/13/18 08:06 Dose:  1 mg


Pantoprazole Sodium (Protonix Ec Tab)  40 mg PO 0600 [facility]


   Last Admin: 08/13/18 05:24 Dose:  Not Given


Risperidone (Risperdal Tab)  0.5 mg PO DAILY [facility]


   Last Admin: 08/13/18 09:08 Dose:  0.5 mg











Results





- Vital Signs


Recent Vital Signs: 


 Last Vital Signs











Temp  98.7 F   08/12/18 16:28


 


Pulse  80   08/13/18 07:54


 


Resp  20   08/13/18 07:54


 


BP  146/91 H  08/13/18 07:54


 


Pulse Ox  96   08/13/18 07:54














- Labs


Result Diagrams: 


 08/13/18 06:30





 08/11/18 23:34


Labs: 


 Laboratory Results - last 24 hr











  08/13/18





  06:30


 


WBC  8.1  D


 


RBC  5.23


 


Hgb  14.9


 


Hct  44.4


 


MCV  84.9


 


MCH  28.5


 


MCHC  33.6


 


RDW  12.1


 


Plt Count  150


 


MPV  10.1














Attending/Attestation





- Attestation


I have personally seen and examined this patient.: Yes


I have fully participated in the care of the patient.: Yes


I have reviewed all pertinent clinical information: Yes

## 2018-08-13 NOTE — CARD
--------------- APPROVED REPORT --------------





Date of service: 08/13/2018



EKG Measurement

Heart [id number]

ID [id number]

[id number]

[id number]

[id number]



<Conclusion>

Normal sinus rhythm

Normal ECG

## 2018-08-13 NOTE — CP.PCM.PN
<Estevan Gillis - Last Filed: 08/13/18 15:50>





Subjective





- Date & Time of Evaluation


Date of Evaluation: 08/13/18 (n)


Time of Evaluation: 08:53





- Subjective


Subjective: 


Patient seen and examined by bedside. He laying in bed, appears anxious, 

irritable and not able to answer questions appropriately. Speech not 

understood. As per nurse, patient got agitated during the night and was given 

Giodon 10 mg to calm him down after Ativan failure. Patient denied chest pain, 

SOB, headache, palpitation, fever.











Objective





- Vital Signs/Intake and Output


Vital Signs (last 24 hours): 


 











Temp Pulse Resp BP Pulse Ox


 


 98.7 F   80   20   146/91 H  96 


 


 08/12/18 16:28  08/13/18 07:54  08/13/18 07:54  08/13/18 07:54  08/13/18 07:54











- Medications


Medications: 


 Current Medications





Acetaminophen (Tylenol 325mg Tab)  650 mg PO Q4H PRN


   PRN Reason: Pain, Mild (1-3)


Benztropine Mesylate (Cogentin)  1 mg PO [facility]


   Last Admin: 08/12/18 21:16 Dose:  Not Given


Divalproex Sodium (Depakote Dr(*Bid*))  [facility]


   Last Admin: 08/12/18 21:16 Dose:  Not Given


Enoxaparin Sodium (Lovenox)  40 mg SC DAILY [facility]


   PRN Reason: Protocol


   Last Admin: 08/12/18 10:14 Dose:  40 mg


Fluoxetine HCl (Prozac)  40 mg PO DAILY [facility]


   Last Admin: 08/12/18 13:15 Dose:  40 mg


Gabapentin (Neurontin)  600 mg PO QID [facility]


   PRN Reason: Protocol


   Last Admin: 08/12/18 21:16 Dose:  Not Given


Hydroxyzine Pamoate (Vistaril)  25 mg PO BID PRN; Protocol


   PRN Reason: Anxiety


   Last Admin: 08/12/18 16:28 Dose:  25 mg


Ibuprofen (Motrin Tab)  400 mg PO Q6H PRN


   PRN Reason: Pain, moderate (4-7)


Lorazepam (Ativan)  1 mg IVP Q6H PRN; Protocol


   PRN Reason: Anxiety


   Last Admin: 08/13/18 08:06 Dose:  1 mg


Pantoprazole Sodium (Protonix Ec Tab)  40 mg PO 0600 [facility]


   Last Admin: 08/13/18 05:24 Dose:  Not Given


Risperidone (Risperdal Tab)  0.5 mg PO DAILY [facility]


   Last Admin: 08/12/18 13:15 Dose:  0.5 mg











- Labs


Labs: 


 





 08/13/18 06:30 











- Constitutional


Appears: Agitated, Confused





- Head Exam


Head Exam: ATRAUMATIC, NORMAL INSPECTION, NORMOCEPHALIC





- Eye Exam


Eye Exam: EOMI, Normal appearance, PERRL


Pupil Exam: PERRL





- ENT Exam


ENT Exam: Mucous Membranes Dry, Normal Oropharynx





- Neck Exam


Neck Exam: Full ROM, Normal Inspection.  absent: Lymphadenopathy





- Respiratory Exam


Respiratory Exam: Clear to Ausculation Bilateral, NORMAL BREATHING PATTERN





- Cardiovascular Exam


Cardiovascular Exam: REGULAR RHYTHM, +S1, +S2.  absent: Murmur





- GI/Abdominal Exam


GI & Abdominal Exam: Soft, Normal Bowel Sounds.  absent: Tenderness





- Extremities Exam


Extremities Exam: absent: Joint Swelling, Pedal Edema, Tenderness


Additional comments: 





Right lower leg scar, ppear clean and no signs of infection. Mild right foot 

erythema. 





- Back Exam


Back Exam: NORMAL INSPECTION





- Neurological Exam


Neurological Exam: Oriented x3





- Psychiatric Exam


Psychiatric exam: Agitated, Anxious





- Skin


Skin Exam: Dry, Warm





Assessment and Plan





- Assessment and Plan (Free Text)


Assessment: 


47 year old male with PMHx of bipolar disorder, schizoaffective disorder, 

polysubstance abuse, multiple psychiatric admissions, R LE compartment syndrome 

s/p fasciotomy, osteomyelitis. Patient admitted for AMS after changes done to 

his psych medication in [facility] where he resides.


Plan: 





Altered mental status:


-Likely due to polypharmacy


-As per neurology consulat, Dr Krishnan: continuing Depakote and Neurontin to 

prevent seizures in the setting of the current antipsychotic medications that 

can        lower the seizure threshold. 


-Consider  EEG and MRI if symptoms return and persist 


-EKG (8/11): sinus tachy @102, right axis deviation. pulmonary diease pattern. 

QTc 460


-Haldol 5 mg for agitation














Right lower extremity wound:


-WBCs: 8.1 (8/13) was 13.9 (8/11) Rocephin discontinued


- CRP 32 (8/12)


-Procalcitonin 0.05


-Wound appears dry, appears non-infected


-Right foot X-rays, no evidence of OM


-As per Podiatry consult: no intervention needed, no surgical intervention at 

this time. Stable from podiatry standpoint


-continue local wound care


-follow up with attending Dr. Albright/Dr. Hernandez at the wound care center after 

d/c














Heart healthy diet


Aspiration precaution


Seizure precaution


High fall risk protocol


PT eval/therapy


DVT ppx Lovenox 40 mg daily





1:1 











<Renetta Bourne R - Last Filed: 08/14/18 07:55>





Objective





- Vital Signs/Intake and Output


Vital Signs (last 24 hours): 


 











Temp Pulse Resp BP Pulse Ox


 


 97.4 F L  98 H  20   140/86   98 


 


 08/13/18 16:48  08/13/18 16:48  08/13/18 16:48  08/13/18 16:48  08/13/18 16:48








Intake and Output: 


 











 08/14/18 08/14/18





 06:59 18:59


 


Intake Total 480 


 


Balance 480 














- Medications


Medications: 


 Current Medications





Acetaminophen (Tylenol 325mg Tab)  650 mg PO Q4H PRN


   PRN Reason: Pain, Mild (1-3)


   Last Admin: 08/14/18 07:03 Dose:  650 mg


Benztropine Mesylate (Cogentin)  0.5 mg PO [facility]


   Last Admin: 08/13/18 22:40 Dose:  0.5 mg


Divalproex Sodium (Depakote Dr(*Bid*))  500 mg PO [facility]


   Last Admin: 08/13/18 22:41 Dose:  500 mg


Enoxaparin Sodium (Lovenox)  40 mg SC DAILY [facility]


   PRN Reason: Protocol


   Last Admin: 08/13/18 09:07 Dose:  40 mg


Gabapentin (Neurontin)  600 mg PO QID HOME


   PRN Reason: Protocol


   Last Admin: 08/13/18 22:41 Dose:  600 mg


Haloperidol (Haldol)  5 mg PO Q6 PRN; Protocol


   PRN Reason: Agitation


   Last Admin: 08/13/18 15:27 Dose:  5 mg


Ibuprofen (Motrin Tab)  400 mg PO Q6H PRN


   PRN Reason: Pain, moderate (4-7)


Lorazepam (Ativan)  1 mg IVP Q6H PRN; Protocol


   PRN Reason: Anxiety


   Last Admin: 08/14/18 07:04 Dose:  1 mg


Pantoprazole Sodium (Protonix Ec Tab)  40 mg PO 0600 [facility]


   Last Admin: 08/14/18 06:00 Dose:  Not Given


Quetiapine Fumarate (Seroquel Xr)  300 mg PO QPM HOME


   PRN Reason: Protocol


   Last Admin: 08/13/18 18:13 Dose:  300 mg











- Labs


Labs: 


 





 08/14/18 06:45 











Attending/Attestation





- Attestation


I have personally seen and examined this patient.: Yes


I have fully participated in the care of the patient.: Yes


I have reviewed all pertinent clinical information, including history, physical 

exam and plan: Yes


Notes (Text): 


Patient seen and examined by me at 10:55AM with resident 8/13/18.  Case 

including HPI, physical exam, and  assessment and plan discussed with resident. 

Agree with above with following additions/corrections.


Patient is a 47-year-old male with past medical history significant for bipolar 

disorder, schizoaffective disorder, polysubstance abuse, multiple psychiatric 

admissions, right lower extremity compartments syndrome status post fasciotomy, 

and osteomyelitis that presented to the emergency room with altered mental 

status and shaking.


At my time of exam patient is AAO 3. He states that he is feeling okay. States 

he has constant pain in his right lower extremity and relates that at this 8 

out of 10. He states the pain is burning in sensation. He is able to ambulate. 

No associated fevers or chills. No abdominal pain, nausea, or vomiting.  No 

chest pain or shortness of breath. No headaches or dizziness. No diarrhea or 

constipation. No dysuria.


Physical exam:


Gen: Awake and alert sitting up in chair in no acute distress


HEENT: Normocephalic, atraumatic. Extraocular muscles intact, pupils equal 

reactive. No scleral icterus. Oropharynx is pink and moist. No pharyngeal 

erythema or exudate appreciated. Neck is supple. 


Cardiovascular: Normal rhythm.  Normal S1, S2. No murmurs, rubs, or gallops 

appreciated 


Pulmonary: Normal respiratory effort. No rhonchi, rales or wheezing 

appreciated. 


Gastrointestinal: Soft, nontender, nondistended, positive bowel sounds all 4 

quadrants, no guarding. 


Musculoskeletal: Normal range of motion all extremities, no calf tenderness. No 

CVA tenderness. 


Central nervous system:  AAO x 3.


Dermatologic:  Skin warm and dry, right lower extremity with well-healed scar, 

right foot with dime-sized small healing wound on lateral foot


Assessment and plan: Patient is a 47-year-old male with past medical history 

significant for bipolar disorder, schizoaffective disorder, polysubstance abuse

, multiple psychiatric admissions, right lower extremity compartments syndrome 

status post fasciotomy, and osteomyelitis that presented to the emergency room 

with altered mental status and shaking.


1. Altered mental status. Intermittent. Likely secondary to polypharmacy with 

psychiatric meds as well as underlying psychiatric conditions. Neurology 

consulted, recommendations appreciated. Psychiatry consulted, recommendations 

appreciated. Per neurology continue Depakote and gabapentin. Continue Cogentin 

and Seroquel. Continue with Haldol and Ativan when necessary. Continue with one-

to-one. Follow-up with psychiatry for admission to psychiatric floor. CT head 

per radiology showed no acute intracranial hemorrhage, suspect minimal chronic 

periventricular white matter ischemic changes. We'll place on baby aspirin for 

now


2. History of bipolar disorder, schizoaffective disorder. Psychiatrist following

, recommendations appreciated. Continue Cogentin and Seroquel. Continue Haldol 

and Ativan as needed. Patient will need admission to psychiatric floor pending 

acceptance by psychiatric team.


3. Right foot wound. ID following, recommendations appreciated. Podiatry 

following, recommendations appreciated. Wound is not infected and there is no 

antibiotic intervention indicated. Right foot x-ray per radiologist showed 

limited study due to overlying clothing/sock related artifact which obscures 

fine soft tissue and bone detail, no evidence of acute displaced fracture nor 

dislocation, no obvious cortical destructive changes. Continue local wound care


4. Leukocytosis. Likely reactive. Resolved. Continue to monitor.


5. GI/DVT prophylaxis. Protonix and Lovenox


Case was discussed in detail with the patient regarding current diagnosis and 

treatment plan.

## 2018-08-14 ENCOUNTER — HOSPITAL ENCOUNTER (INPATIENT)
Dept: HOSPITAL 42 - PSYC | Age: 48
LOS: 7 days | Discharge: HOME | DRG: 430 | End: 2018-08-21
Attending: PSYCHOLOGIST | Admitting: PSYCHOLOGIST
Payer: MEDICAID

## 2018-08-14 VITALS
OXYGEN SATURATION: 95 % | RESPIRATION RATE: 19 BRPM | SYSTOLIC BLOOD PRESSURE: 112 MMHG | DIASTOLIC BLOOD PRESSURE: 74 MMHG | TEMPERATURE: 98 F | HEART RATE: 74 BPM

## 2018-08-14 VITALS — BODY MASS INDEX: 30.7 KG/M2

## 2018-08-14 DIAGNOSIS — F31.9: ICD-10-CM

## 2018-08-14 DIAGNOSIS — Z91.14: ICD-10-CM

## 2018-08-14 DIAGNOSIS — F19.24: ICD-10-CM

## 2018-08-14 DIAGNOSIS — F17.210: ICD-10-CM

## 2018-08-14 DIAGNOSIS — E78.5: ICD-10-CM

## 2018-08-14 DIAGNOSIS — E78.1: ICD-10-CM

## 2018-08-14 DIAGNOSIS — Z91.5: ICD-10-CM

## 2018-08-14 DIAGNOSIS — F25.9: Primary | ICD-10-CM

## 2018-08-14 DIAGNOSIS — F05: ICD-10-CM

## 2018-08-14 LAB
ERYTHROCYTE [DISTWIDTH] IN BLOOD BY AUTOMATED COUNT: 12.2 % (ref 11.5–14.5)
HGB BLD-MCNC: 14 G/DL (ref 14–18)
MCH RBC QN AUTO: 28.6 PG (ref 25–35)
MCHC RBC AUTO-ENTMCNC: 33.9 G/DL (ref 31–37)
MCV RBC AUTO: 84.3 FL (ref 80–105)
PLATELET # BLD: 152 10^3/UL (ref 120–450)
PMV BLD AUTO: 9.9 FL (ref 7–11)
RBC # BLD AUTO: 4.9 10^6/UL (ref 3.5–6.1)
WBC # BLD AUTO: 6.7 10^3/UL (ref 4.5–11)

## 2018-08-14 RX ADMIN — PANTOPRAZOLE SODIUM SCH: 40 TABLET, DELAYED RELEASE ORAL at 06:00

## 2018-08-14 RX ADMIN — DIVALPROEX SODIUM SCH: 500 TABLET, DELAYED RELEASE ORAL at 22:29

## 2018-08-14 RX ADMIN — ENOXAPARIN SODIUM SCH MG: 40 INJECTION SUBCUTANEOUS at 09:46

## 2018-08-14 RX ADMIN — DIVALPROEX SODIUM SCH MG: 500 TABLET, DELAYED RELEASE ORAL at 09:46

## 2018-08-14 NOTE — CP.PCM.PN
<Senia Bourne - Last Filed: 08/14/18 14:02>





Subjective





- Date & Time of Evaluation


Date of Evaluation: 08/14/18


Time of Evaluation: 14:02





- Subjective


Subjective: 





Podiatry Progress note: Dr. Hernandez





47 year old male was seen and evaluated at bedside for right foot wound. 

Patient is alert and awake, appears in NAD. Patient is altered mental status. 

No acute events overnight. No other pedal complains at this time.





Objective





- Vital Signs/Intake and Output


Vital Signs (last 24 hours): 


 











Temp Pulse Resp BP Pulse Ox


 


 98 F   74   19   112/74   95 


 


 08/14/18 08:30  08/14/18 08:30  08/14/18 08:30  08/14/18 08:30  08/14/18 08:30








Intake and Output: 


 











 08/14/18 08/14/18





 06:59 18:59


 


Intake Total 480 


 


Balance 480 














- Medications


Medications: 


 Current Medications





Acetaminophen (Tylenol 325mg Tab)  650 mg PO Q6H PRN


   PRN Reason: Pain, Mild (1-3)


   Last Admin: 08/14/18 13:20 Dose:  650 mg


Aspirin (Aspirin Chewable)  81 mg PO DAILY [facility]


   Last Admin: 08/14/18 09:46 Dose:  81 mg


Benztropine Mesylate (Cogentin)  0.5 mg PO [facility]


   Last Admin: 08/14/18 09:46 Dose:  0.5 mg


Divalproex Sodium (Depakote Dr(*Bid*))  500 mg PO [facility]


   Last Admin: 08/14/18 09:46 Dose:  500 mg


Enoxaparin Sodium (Lovenox)  40 mg SC DAILY [facility]


   PRN Reason: Protocol


   Last Admin: 08/14/18 09:46 Dose:  40 mg


Gabapentin (Neurontin)  600 mg PO QID [facility]


   PRN Reason: Protocol


   Last Admin: 08/14/18 13:19 Dose:  600 mg


Haloperidol (Haldol)  5 mg PO Q6 PRN; Protocol


   PRN Reason: Agitation


   Last Admin: 08/13/18 15:27 Dose:  5 mg


Ibuprofen (Motrin Tab)  400 mg PO Q6H PRN


   PRN Reason: Pain, moderate (4-7)


Lorazepam (Ativan)  1 mg IVP Q6H PRN; Protocol


   PRN Reason: Anxiety


   Last Admin: 08/14/18 13:20 Dose:  1 mg


Pantoprazole Sodium (Protonix Ec Tab)  40 mg PO 0600 [facility]


   Last Admin: 08/14/18 06:00 Dose:  Not Given


Quetiapine Fumarate (Seroquel Xr)  500 mg PO QPM [facility]


   PRN Reason: Protocol











- Labs


Labs: 


 





 08/14/18 06:45 











- Constitutional


Appears: Well, Non-toxic, No Acute Distress





- Extremities Exam


Additional comments: 





Right foot focused lower extremity exam





Vasc: DP/PT pulses are palpable 2/4, CFT <3 secs x5, Temp gradient warm to cool 

proximal to distal, no pitting or non-pitting edema noted





Derm: Superficial Ulcer with thin skin edges noted on the dorsal aspect of the 

right 5th metatarsal head, wound base is mainly grannular with no fibrosis or 

active drainage, no tunneling or undermining, no malodor, no purulence noted, 

no probe to bone, no clinical suspicion of active infectious process at this 

time





Neuro: Protective sensation grossly intact





Ortho: no pain on palpation at the ulcer, no pain on ROM of the 5th digit








- Neurological Exam


Neurological Exam: Alert, Awake, Oriented x3





- Psychiatric Exam


Psychiatric exam: Normal Affect, Normal Mood





Assessment and Plan





- Assessment and Plan (Free Text)


Assessment: 





47 year old male with right dorso-lateral foot wound; clinically non-infected (

Thrasher 1)


Plan: 





Patient seen and evaluated 


Discussed with attending Dr. Hernandez


Labs, vitals and charts reviewed; Afebrile, absent leukocytosis


Site cleaned using saline and dressing applied using 4x4 and tape


Right foot X-rays - no evidence of OM 


Wound appears clinical non-infected at this time - no intervention needed at 

this time


Podiatry plans no surgical intervention at this time


Stable from podiatry standpoint


Will continue to monitor patient while in-house; continue local wound care


Upon discharge, follow up with attending Dr. Albright/Dr. Hernandez at the wound 

care center





<Kevin Hernandez - Last Filed: 08/14/18 14:36>





Objective





- Vital Signs/Intake and Output


Vital Signs (last 24 hours): 


 











Temp Pulse Resp BP Pulse Ox


 


 98 F   74   19   112/74   95 


 


 08/14/18 08:30  08/14/18 08:30  08/14/18 08:30  08/14/18 08:30  08/14/18 08:30








Intake and Output: 


 











 08/14/18 08/14/18





 06:59 18:59


 


Intake Total 480 


 


Balance 480 














- Medications


Medications: 


 Current Medications





Acetaminophen (Tylenol 325mg Tab)  650 mg PO Q6H PRN


   PRN Reason: Pain, Mild (1-3)


   Last Admin: 08/14/18 13:20 Dose:  650 mg


Aspirin (Aspirin Chewable)  81 mg PO DAILY [facility]


   Last Admin: 08/14/18 09:46 Dose:  81 mg


Benztropine Mesylate (Cogentin)  0.5 mg PO [facility]


   Last Admin: 08/14/18 09:46 Dose:  0.5 mg


Divalproex Sodium (Depakote Dr(*Bid*))  500 mg PO [facility]


   Last Admin: 08/14/18 09:46 Dose:  500 mg


Enoxaparin Sodium (Lovenox)  40 mg SC DAILY [facility]


   PRN Reason: Protocol


   Last Admin: 08/14/18 09:46 Dose:  40 mg


Gabapentin (Neurontin)  600 mg PO QID [facility]


   PRN Reason: Protocol


   Last Admin: 08/14/18 13:19 Dose:  600 mg


Haloperidol (Haldol)  5 mg PO Q6 PRN; Protocol


   PRN Reason: Agitation


   Last Admin: 08/13/18 15:27 Dose:  5 mg


Ibuprofen (Motrin Tab)  400 mg PO Q6H PRN


   PRN Reason: Pain, moderate (4-7)


Lorazepam (Ativan)  1 mg IVP Q6H PRN; Protocol


   PRN Reason: Anxiety


   Last Admin: 08/14/18 13:20 Dose:  1 mg


Mupirocin (Bactroban Ointment)  0 gm TOP BID [facility]


Pantoprazole Sodium (Protonix Ec Tab)  40 mg PO 0600 [facility]


   Last Admin: 08/14/18 06:00 Dose:  Not Given


Quetiapine Fumarate (Seroquel Xr)  200 mg PO DAILY [facility]


Quetiapine Fumarate (Seroquel Xr)  300 mg PO DAILY [facility]











- Labs


Labs: 


 





 08/14/18 06:45 











Attending/Attestation





- Attestation


I have personally seen and examined this patient.: Yes


I have fully participated in the care of the patient.: Yes


I have reviewed all pertinent clinical information, including history, physical 

exam and plan: Yes

## 2018-08-14 NOTE — CP.PCM.PN
Subjective





- Date & Time of Evaluation


Date of Evaluation: 08/13/18


Time of Evaluation: 09:35





- Subjective


Subjective: 





No fevers, not in distress, no increase in right foot pain.





Objective





- Vital Signs/Intake and Output


Vital Signs (last 24 hours): 


 











Temp Pulse Resp BP Pulse Ox


 


 98.7 F   88   20   129/74   94 L


 


 08/12/18 16:28  08/12/18 16:28  08/12/18 16:28  08/12/18 16:28  08/12/18 16:28











- Medications


Medications: 


 Current Medications





Acetaminophen (Tylenol 325mg Tab)  650 mg PO Q4H PRN


   PRN Reason: Pain, Mild (1-3)


Benztropine Mesylate (Cogentin)  1 mg PO [facility] HOME


Divalproex Sodium (Depakote Dr(*Bid*))  500 mg PO [facility]


Enoxaparin Sodium (Lovenox)  40 mg SC DAILY [facility]


   PRN Reason: Protocol


   Last Admin: 08/12/18 10:14 Dose:  40 mg


Fluoxetine HCl (Prozac)  40 mg PO DAILY [facility]


   Last Admin: 08/12/18 13:15 Dose:  40 mg


Gabapentin (Neurontin)  600 mg PO QID HOME


   PRN Reason: Protocol


   Last Admin: 08/12/18 13:27 Dose:  600 mg


Hydroxyzine Pamoate (Vistaril)  25 mg PO BID PRN; Protocol


   PRN Reason: Anxiety


   Last Admin: 08/12/18 16:28 Dose:  25 mg


Ceftriaxone Sodium (Rocephin 1 Gram Ivpb)  1 gm in 100 mls @ 100 mls/hr IVPB 

DAILY [facility]


   PRN Reason: Protocol


   Last Admin: 08/12/18 10:13 Dose:  100 mls/hr


Vancomycin HCl (Vancomycin 1gm)  1 gm in 250 mls @ 167 mls/hr IVPB DAILY [facility]


   PRN Reason: Protocol


   Last Admin: 08/12/18 10:13 Dose:  167 mls/hr


Ibuprofen (Motrin Tab)  400 mg PO Q6H PRN


   PRN Reason: Pain, moderate (4-7)


Pantoprazole Sodium (Protonix Ec Tab)  40 mg PO 0600 [facility]


Risperidone (Risperdal Tab)  0.5 mg PO DAILY [facility]


   Last Admin: 08/12/18 13:15 Dose:  0.5 mg











- Constitutional


Appears: Non-toxic, Chronically Ill





- Respiratory Exam


Respiratory Exam: Decreased Breath Sounds





- Cardiovascular Exam


Cardiovascular Exam: +S1, +S2





- GI/Abdominal Exam


GI & Abdominal Exam: Soft.  absent: Tenderness





Assessment and Plan





- Assessment and Plan (Free Text)


Plan: 








Assessment


history of right foot cellulitis with right 1st, 2nd and 5th toe osteomyelitis, 

S/P treatment with IV antibiotics


schizoaffective disorder


bipolar disorder


anxiety and depression


history of right leg fasciotomy





Plan


will continue to monitor off antibiotics, foot xray does not show infiltrates -

reviewed Podiatry evaluation and also recommend to monitor off antibiotics 


explained to patient that he needs follow up with Podiatry as an outpatient

## 2018-08-14 NOTE — CP.PCM.PN
<CarlinEstevan - Last Filed: 08/14/18 16:20>





Subjective





- Date & Time of Evaluation


Date of Evaluation: 08/14/18


Time of Evaluation: 20:00





- Subjective


Subjective: 





PGY1 Medicine note for Dr. Bourne





Patient seen and examined at bedside. Patient laying comfortably in bed, denied 

chest pain, shortness of breath, headache, suicidal/homicidal ideation. Per 

nurse, no acute events overnight; no agitation or irritability noted. Patient 

slept throughout the night. 





Objective





- Vital Signs/Intake and Output


Vital Signs (last 24 hours): 


 











Temp Pulse Resp BP Pulse Ox


 


 98 F   74   19   112/74   95 


 


 08/14/18 08:30  08/14/18 08:30  08/14/18 08:30  08/14/18 08:30  08/14/18 08:30











- Medications


Medications: 


 Current Medications





Acetaminophen (Tylenol 325mg Tab)  650 mg PO Q6H PRN


   PRN Reason: Pain, Mild (1-3)


   Last Admin: 08/14/18 13:20 Dose:  650 mg


Aspirin (Aspirin Chewable)  81 mg PO DAILY [facility]


   Last Admin: 08/14/18 09:46 Dose:  81 mg


Benztropine Mesylate (Cogentin)  0.5 mg PO [facility]


   Last Admin: 08/14/18 09:46 Dose:  0.5 mg


Divalproex Sodium (Depakote Dr(*Bid*))  500 mg PO [facility]


   Last Admin: 08/14/18 09:46 Dose:  500 mg


Enoxaparin Sodium (Lovenox)  40 mg SC DAILY [facility]


   PRN Reason: Protocol


   Last Admin: 08/14/18 09:46 Dose:  40 mg


Gabapentin (Neurontin)  600 mg PO QID [facility]


   PRN Reason: Protocol


   Last Admin: 08/14/18 13:19 Dose:  600 mg


Haloperidol (Haldol)  5 mg PO Q6 PRN; Protocol


   PRN Reason: Agitation


   Last Admin: 08/13/18 15:27 Dose:  5 mg


Ibuprofen (Motrin Tab)  400 mg PO Q6H PRN


   PRN Reason: Pain, moderate (4-7)


Lorazepam (Ativan)  1 mg IVP Q6H PRN; Protocol


   PRN Reason: Anxiety


   Last Admin: 08/14/18 13:20 Dose:  1 mg


Mupirocin (Bactroban Ointment)  0 gm TOP BID [facility]


Pantoprazole Sodium (Protonix Ec Tab)  40 mg PO 0600 [facility]


   Last Admin: 08/14/18 06:00 Dose:  Not Given


Quetiapine Fumarate (Seroquel Xr)  200 mg PO DAILY [facility]


Quetiapine Fumarate (Seroquel Xr)  300 mg PO DAILY [facility]











- Constitutional


Appears: Well, No Acute Distress





- Head Exam


Head Exam: ATRAUMATIC, NORMAL INSPECTION, NORMOCEPHALIC





- Eye Exam


Eye Exam: EOMI, Normal appearance, PERRL


Pupil Exam: NORMAL ACCOMODATION, PERRL





- ENT Exam


ENT Exam: Mucous Membranes Moist, Normal Exam





- Neck Exam


Neck Exam: Full ROM, Normal Inspection.  absent: Lymphadenopathy





- Respiratory Exam


Respiratory Exam: Clear to Ausculation Bilateral, NORMAL BREATHING PATTERN





- Cardiovascular Exam


Cardiovascular Exam: REGULAR RHYTHM, +S1, +S2.  absent: Murmur





- GI/Abdominal Exam


GI & Abdominal Exam: Soft, Normal Bowel Sounds.  absent: Tenderness





- Extremities Exam


Extremities Exam: Full ROM, Normal Capillary Refill, Normal Inspection.  absent

: Joint Swelling, Pedal Edema


Additional comments: 





Right lower leg scar, it is clean, dry. no signs of infection, no erythema.





- Back Exam


Back Exam: NORMAL INSPECTION





- Neurological Exam


Neurological Exam: Awake, CN II-XII Intact, Oriented x3





- Psychiatric Exam


Psychiatric exam: Depressed, Flat Affect.  absent: Homicidal Ideation, Suicidal 

Ideation


Additional comments: 





patient is anxious about his hospital stay and want to be discharged home.  





- Skin


Skin Exam: Dry, Normal Color, Rash, Warm


Additional comments: 





mild erythema around nose and mouth 





Assessment and Plan





- Assessment and Plan (Free Text)


Assessment: 


46 y/o male with pmhx of bipolar disorder, schizoaffective disorder, 

polysubstance abuse, multiple psychiatric admissions, R LE compartment syndrome 

s/p fasciotomy, osteomyelitis presented to ED after being bib EMS for altered 

mental status and confusion. CT head shows no intracranial hemorrhage. 


Plan: 


Altered Mental Status


- Likely secondary to polypharmacy


- Patient still have episodes of agitation during the night that require giving 

him Ativan/Geodon 


- Psychiatry consulted to adjust his meds; Ludmila Soriano


- Neurology consulted Dr. Krishnan: no underlying neurological disorder


    continuing Depakote and Neurontin to prevent seizures in the setting of the 

current antipsychotic medications that can lower the seizure threshold.  


    consider an EEG and an MRI if symptoms return and persist


- Seizure precautions


- Aspiration precautions


- Continue  1:1 observation


- Pain management Tylenol mild pain, Motrin moderate pain prn


- Ativan 1 mg prn for agitation





Hx of schizoaffective disorder, bipolar disorder


- Psych meds adjusted by psych team


- Consult psychiatry Dr. Lopez to admit the patient in psych unit as he is 

medically clear.


- Continue meds per psych 





Hx of right lower leg cellulitis/compartment syndrome  s/p fasciotomy


-Right leg is atrophied s/p fasciotomy. no signs of infection.


-Right leg blister on the dorsal aspect of the foot


-Right foot x-ray shows no fracture 


-Podiatry consulted: no need for right foot MRI, there is no signs of infection

, clean wound


-Podiatry and ID recommend monitor off abx and follow up with podiatry 

outpatient





DVT ppx Lovenox


GI ppx Protonix


High fall risk





Discussed with Dr. Steffen Gillis PGY1








<Renetta Bourne R - Last Filed: 08/15/18 08:41>





Objective





- Vital Signs/Intake and Output


Vital Signs (last 24 hours): 


 











Temp Pulse Resp BP Pulse Ox


 


 98 F   74   19   112/74   95 


 


 08/14/18 08:30  08/14/18 08:30  08/14/18 08:30  08/14/18 08:30  08/14/18 08:30











Attending/Attestation





- Attestation


I have personally seen and examined this patient.: Yes


I have fully participated in the care of the patient.: Yes


I have reviewed all pertinent clinical information, including history, physical 

exam and plan: Yes


Notes (Text): 


Patient seen and examined by me at 10:30AM with resident 8/14/18.  Case 

including HPI, physical exam, and  assessment and plan discussed with resident. 

Agree with above with following additions/corrections.


Patient states that he is feeling ok today. Incoherent with thoughts. He is 

denying and leg pain in his right leg today. He is stating he is going home and 

he mohter needs to be provided with percocet for him. Patient's sister and 

mother at bedside.  Patient is ambulating without difficulty. No fevers or 

chills. No abdominal pain, nausea, or vomiting.  No chest pain or shortness of 

breath. No headaches or dizziness. No diarrhea or constipation. No dysuria.


Patient's sister states that patient was on too many medications at home. Per 

mom, patient was taking approximately "30 pills everyday for one week." Per 

family, patient has a history of drug abuse. 


Physical exam:


Gen: Awake and alert sitting up in chair in no acute distress


HEENT: Normocephalic, atraumatic. Extraocular muscles intact, pupils equal 

reactive. No scleral icterus. Oropharynx is pink and moist. No pharyngeal 

erythema or exudate appreciated. Neck is supple. 


Cardiovascular: Normal rhythm.  Normal S1, S2. No murmurs, rubs, or gallops 

appreciated 


Pulmonary: Normal respiratory effort. No rhonchi, rales or wheezing 

appreciated. 


Gastrointestinal: Soft, nontender, nondistended, positive bowel sounds all 4 

quadrants, no guarding. 


Musculoskeletal: Normal range of motion all extremities, no calf tenderness. No 

CVA tenderness. 


Central nervous system:  AAO x 3.


Dermatologic:  Skin warm and dry, right lower extremity with well-healed scar, 

right foot with dime-sized small healing wound on lateral foot


Assessment and plan: Patient is a 47-year-old male with past medical history 

significant for bipolar disorder, schizoaffective disorder, polysubstance abuse

, multiple psychiatric admissions, right lower extremity compartments syndrome 

status post fasciotomy, and osteomyelitis that presented to the emergency room 

with altered mental status and shaking.


1. Altered mental status. Intermittent. Likely secondary to polypharmacy with 

psychiatric meds as well as underlying psychiatric conditions. Neurology 

consulted, recommendations appreciated. Psychiatry consulted, recommendations 

appreciated. Per neurology continue Depakote and gabapentin. Continue Cogentin 

and Seroquel. Continue with Haldol and Ativan when necessary. Continue with one-

to-one.  Pending admission to psychiatry unit. CT head per radiology showed no 

acute intracranial hemorrhage, suspect minimal chronic periventricular white 

matter ischemic changes. Continue ASA. 


2. History of bipolar disorder, schizoaffective disorder. Psychiatrist following

, recommendations appreciated. Continue Cogentin and Seroquel. Continue Haldol 

and Ativan as needed. Pending admission to psychiatric unit. 


3. Right foot wound. ID following, recommendations appreciated. Podiatry 

following, recommendations appreciated. Wound is not infected and there is no 

antibiotic intervention indicated. Right foot x-ray per radiologist showed 

limited study due to overlying clothing/sock related artifact which obscures 

fine soft tissue and bone detail, no evidence of acute displaced fracture nor 

dislocation, no obvious cortical destructive changes. Continue local wound care


4. Leukocytosis. Likely reactive. Resolved. Continue to monitor.


5. GI/DVT prophylaxis. Protonix and Lovenox


Case was discussed in detail with the patient, patient's sister, and patient's 

mother at bedside regarding current diagnosis and treatment plan.

## 2018-08-14 NOTE — CON
Copied To:  Devendra Lopez MD

Attending MD:  Devendra Lopez MD



DATE:  08/13/2018



HISTORY OF PRESENT ILLNESS:  The patient is a 47-year-old white male with

long history of mental illness, likely bipolar disorder with psychotic

features versus schizoaffective disorder, history of polysubstance abuse

and dependence, which has been severe in the past, history of involuntary

commitment to [facility] as well as noncompliant to

medications, who was brought to the ER due to increased confusion and

mental status changes.  The patient was medically hospitalized due to

elevated white count, which has since decreased; however, notably , he

continues to be confused and unpredictable, illogical and impulsive. 

Psychiatry is following up with the patient to help with mental status and

behavioral control.  I am familiar with the patient from my prior

consultation with him on 06/23/2018, which I checked over the consultation

for Dr. Corey after we were consulted for his slurred speech at that

time.  At that time, the patient's medications were tapered to Cogentin 1

mg a.m. and at bedtime, Depakote 500 mg a.m. and at bedtime, Prozac 40 mg,

Seroquel  mg every p.m. and with thereafter, the patient was

discharged to mental health  rehab for approximately 6 weeks to continue IV

antibiotics for the right foot osteomyelitis.  Apparently, during the

course of his rehab, he was seen by another psychiatrist, who had increased

his medications again and apparently the patient became more confused and

unpredictable and with scattered thought process and review of records

indicate that sister reported that the patient had been taking a large

amounts of hydroxyzine, which she was not supposed to be taking as well as

continued the psych medications that was discontinued in the past.  _____

and it is possible that this _____ the patient is not exactly at his

baseline.  I met him at bedside and he was confused.  His thought process

is _____.  It is difficult for him to maintain focused.  He is _____

unpredictable and in high risk of _____.  The patient does have a history

of aggressive behavior in the past and requires medication management while

he was being medically stabilized.  Of note, all I have noted with the

patient during my interview this morning and _____ are grossly intact.  He

denies any issues with hallucinations.  He is clearly poorly oriented and

with some effort, he is able to indicate that it is 08/2018, that he is in

[facility], though first possibly says it is May and possibly says it

is 2009.  The patient is a poor historian.  He is not consistent with his

responses and it is very difficult to maintain a meaningful conversation

with him.  He is actually _____ in the past, so all of these factors have

to be taken to consideration.



His insight and judgement are poor.  He requires one-to-one.



Vital signs are reviewed as well as recent laboratory work, which shows an

improvement in white blood cell count.



Regarding his medications, the patient is on Cogentin 1 mg a.m. and at

bedtime, Depakote 500 mg a.m. and at bedtime, Prozac 40 mg daily, _____ 25

mg p.o. b.i.d. and _____ 0.5 mg daily and Ativan 1 mg IV every 6 p.r.n.



IMPRESSION:  Schizoaffective disorder by history.  Rule out substance abuse

mood disorder, substance abuse psychiatry disorder, due to elevated mood,

rule out schizoaffective adithya as well as _____ his multiple medical

issues.



RECOMMENDATIONS:  At this time, the patient does require psychiatric

medications for stabilization.  We will continue with Depakote 500 mg p.o.

b.i.d.  We will also check his Depakote level since he does not _____ when

he was admitted.  We will discontinue Prozac as the patient does appear to

be a little bit elevated.  Ativan p.r.n. will be continued.  Risperdal will

be discontinued in favor of Seroquel 100 mg a.m. and at bedtime for adithya

and disorganization and impulse control.  Psychiatry will continue to

follow up with the patient and the next visit will be on 08/14/2018,

Tuesday.





__________________________________________

Devendra Lopez MD





DD:  08/13/2018 12:58:50

DT:  08/13/2018 20:34:12

Job # [id number]

## 2018-08-15 LAB
HDLC SERPL-MCNC: 30 MG/DL (ref 29–60)
LDLC SERPL-MCNC: 123 MG/DL (ref 0–129)

## 2018-08-15 RX ADMIN — MUPIROCIN SCH APPLIC: 20 OINTMENT TOPICAL at 17:05

## 2018-08-15 RX ADMIN — DIVALPROEX SODIUM SCH MG: 500 TABLET, DELAYED RELEASE ORAL at 21:54

## 2018-08-15 RX ADMIN — PANTOPRAZOLE SODIUM SCH MG: 20 TABLET, DELAYED RELEASE ORAL at 08:21

## 2018-08-15 RX ADMIN — DIVALPROEX SODIUM SCH MG: 500 TABLET, DELAYED RELEASE ORAL at 09:22

## 2018-08-15 RX ADMIN — MUPIROCIN SCH APPLIC: 20 OINTMENT TOPICAL at 08:21

## 2018-08-15 NOTE — CP.PCM.CON
<Estevan Gillis - Last Filed: 08/15/18 16:42>





History of Present Illness





- History of Present Illness


History of Present Illness: 





Patient seen and examined in psychiatry unit. No significant events overnight. 

Patient reported that he still feels anxious and having right lower leg 

weakness wich is persistent s/p surgery. He denied chest pain, palpitation, 

shortness of breath, headache, fever ,nausea, vomiting. No significant events 

reported by staff nurse.





Review of Systems





- Constitutional


Constitutional: As Per HPI





- EENT


Eyes: As Per HPI


Ears: As Per HPI


Nose/Mouth/Throat: As Per HPI





- Cardiovascular


Cardiovascular: As Per HPI





- Respiratory


Respiratory: As Per HPI





- Gastrointestinal


Gastrointestinal: As Per HPI





- Genitourinary


Genitourinary: As Per HPI





- Musculoskeletal


Musculoskeletal: As Per HPI





- Integumentary


Integumentary: As Per HPI





- Neurological


Neurological: As Per HPI





- Psychiatric


Psychiatric: As Per HPI





- Endocrine


Endocrine: As Per HPI





- Hematologic/Lymphatic


Hematologic: As Per HPI





Past Patient History





- Infectious Disease


Hx of Infectious Diseases: None





- Tetanus Immunizations


Tetanus Immunization: Unknown





- Past Medical History & Family History


Past Medical History?: Yes





- Past Social History


Smoking Status: Heavy Smoker > 10 Cigarettes Daily





- CARDIAC


Hx Cardiac Disorders: No


Hx Hypertension: No





- PULMONARY


Hx Respiratory Disorders: No





- NEUROLOGICAL


HX Cerebrovascular Accident: No





- HEENT


Hx HEENT Problems: No





- RENAL


Hx Chronic Kidney Disease: No





- ENDOCRINE/METABOLIC


Hx Endocrine Disorders: No





- HEMATOLOGICAL/ONCOLOGICAL


Hx Cancer: No





- INTEGUMENTARY


Hx Dermatological Problems: Yes (right leg swelling/ cellulitis)





- MUSCULOSKELETAL/RHEUMATOLOGICAL


Hx Musculoskeletal Disorders: Yes


Hx Falls: Yes


Hx Fractures: Yes


Other/Comment: Brace to right leg, Hx. fasciectomy.





- GASTROINTESTINAL


Hx Gastrointestinal Disorders: No





- GENITOURINARY/GYNECOLOGICAL


Hx Genitourinary Disorders: No





- PSYCHIATRIC


Hx Psychophysiologic Disorder: Yes


Hx Anxiety: Yes


Hx Bipolar Disorder: Yes


Hx Depression: Yes


Hx Substance Use: Yes (extasis used 6/8/18)





- SURGICAL HISTORY


Hx Surgeries: No


Hx Mastectomy: No





- ANESTHESIA


Hx Anesthesia: Yes


Hx Anesthesia Reactions: No


Hx Malignant Hyperthermia: No





Meds


Allergies/Adverse Reactions: 


 Allergies











Allergy/AdvReac Type Severity Reaction Status Date / Time


 


Iodinated Contrast- Oral and Allergy  ITCHING Verified 08/14/18 23:22





IV Dye     














- Medications


Medications: 


 Current Medications





Acetaminophen (Tylenol 325mg Tab)  650 mg PO Q4H PRN


   PRN Reason: Pain, Mild (1-3)


Al Hydrox/Mg Hydrox/Simethicone (Maalox Plus 30 Ml)  30 ml PO DAILY PRN


   PRN Reason: Upset Stomach


Aspirin (Aspirin Chewable)  81 mg PO DAILY [facility]


   Last Admin: 08/15/18 08:20 Dose:  81 mg


Benztropine Mesylate (Cogentin)  [facility].5 mg PO [facility]


   Last Admin: 08/15/18 09:22 Dose:  0.5 mg


Divalproex Sodium (Depakote Dr(*Bid*))  500 mg PO [facility]


   Last Admin: 08/15/18 09:22 Dose:  500 mg


Fenofibrate (Tricor)  145 mg PO DAILY [facility]


   Stop: 08/21/18 23:59


Gabapentin (Neurontin)  600 mg PO QID [facility]


   PRN Reason: Protocol


   Last Admin: 08/15/18 08:20 Dose:  600 mg


Haloperidol (Haldol)  5 mg PO Q6 PRN; Protocol


   PRN Reason: Agitation


   Last Admin: 08/15/18 11:50 Dose:  5 mg


Haloperidol Lactate (Haldol)  5 mg IM Q6H PRN; Protocol


   PRN Reason: Agitation


Ibuprofen (Motrin Tab)  400 mg PO Q6H PRN


   PRN Reason: Pain, moderate (4-7)


Lorazepam (Ativan)  1 mg IM Q6H PRN; Protocol


   PRN Reason: Anxiety


Lorazepam (Ativan)  1 mg PO Q6H PRN; Protocol


   PRN Reason: Agitation


   Last Admin: 08/15/18 11:50 Dose:  1 mg


Magnesium Hydroxide (Milk Of Magnesia)  30 ml PO DAILY PRN


   PRN Reason: Constipation


Mupirocin (Bactroban Ointment)  1 gm TOP BID [facility]


   Last Admin: 08/15/18 08:21 Dose:  1 applic


Pantoprazole Sodium (Protonix Ec Tab)  20 mg PO 0600 [facility]


   Last Admin: 08/15/18 08:21 Dose:  20 mg


Quetiapine Fumarate (Seroquel Xr)  300 mg PO QPM [facility]


   PRN Reason: Protocol











Physical Exam





- Constitutional


Appears: Well, No Acute Distress





- Head Exam


Head Exam: ATRAUMATIC, NORMAL INSPECTION, NORMOCEPHALIC





- Eye Exam


Eye Exam: EOMI, Normal appearance, PERRL


Pupil Exam: NORMAL ACCOMODATION, PERRL





- ENT Exam


ENT Exam: Mucous Membranes Moist, Normal Exam





- Neck Exam


Neck exam: Positive for: Normal Inspection





- Respiratory Exam


Respiratory Exam: Clear to Auscultation Bilateral, NORMAL BREATHING PATTERN





- Cardiovascular Exam


Cardiovascular Exam: REGULAR RHYTHM, +S1, +S2





- GI/Abdominal Exam


GI & Abdominal Exam: Normal Bowel Sounds, Soft.  absent: Tenderness





- Back Exam


Back exam: NORMAL INSPECTION





- Neurological Exam


Neurological exam: Alert, CN II-XII Intact, Normal Gait, Oriented x3, Reflexes 

Normal





- Psychiatric Exam


Psychiatric exam: Anxious, Depressed, Flat Affect





- Skin


Skin Exam: Dry, Intact, Normal Color, Warm





Results





- Vital Signs


Recent Vital Signs: 


 Last Vital Signs











Temp  97.8 F   08/15/18 07:13


 


Pulse  82   08/15/18 07:13


 


Resp  20   08/15/18 07:13


 


BP  126/85   08/15/18 07:13


 


Pulse Ox      














- Labs


Labs: 


 Laboratory Results - last 24 hr











  08/15/18 08/15/18





  07:00 07:00


 


Fasting Glucose  95 


 


Triglycerides  567 H 


 


Cholesterol  232 H 


 


LDL Cholesterol Direct  123 


 


HDL Cholesterol  30 


 


TSH 3rd Generation   1.26














Assessment & Plan





- Assessment and Plan (Free Text)


Assessment: 


46 y/o male with pmhx of bipolar disorder, schizoaffective disorder, 

polysubstance abuse, multiple psychiatric admissions, R LE compartment syndrome 

s/p fasciotomy, osteomyelitis presented to ED after being bib EMS for altered 

mental status and confusion likely due to polypharmacy.





Plan: 


Hx of Right foot cellulitis (right 1st, 2nd, 5th toe)


-Right foot xray without evidence of infiltrates


-Podiatry evaluated patient during inpatient hospital stay with no further 

recommendations for work up, follow up outpatient


-ID evaluated patient with recommendations to monitor off antibiotics and 

follow up outpatient with Podiatry


-Continue wound dressing


-Continue Mupirocin 2% ointment application to affected area





Hyperlipidemia 


-Elevated TG of 567 and CH of 232


-Start Fenofibrate 145 mg Daily


-Diet and exercise, education provided





Delirium  Improving since admission


-Etiology: Likely secondary to polysubstance abuse vs. polypharmacy


-Patient admitted to psychiatric floor, continue to follow psych recs





Hx of  schizoaffective disorder


-Managed as per psychiatry





Polysubstance abuse


-UDS on admission negative 


-Education on cessation 





Patient is medically stable, will sign off on patient. Please re-consult as 

needed. 





- Date & Time


Date: 08/15/18


Time: 14:05





<Renetta Bourne R - Last Filed: 08/16/18 08:06>





Meds





- Medications


Medications: 


 Current Medications





Acetaminophen (Tylenol 325mg Tab)  650 mg PO Q4H PRN


   PRN Reason: Pain, Mild (1-3)


Al Hydrox/Mg Hydrox/Simethicone (Maalox Plus 30 Ml)  30 ml PO DAILY PRN


   PRN Reason: Upset Stomach


Aspirin (Aspirin Chewable)  81 mg PO DAILY [facility]


   Last Admin: 08/15/18 08:20 Dose:  81 mg


Benztropine Mesylate (Cogentin)  0.5 mg PO [facility]


   Last Admin: 08/15/18 21:55 Dose:  0.5 mg


Divalproex Sodium (Depakote Dr(*Bid*))  500 mg PO [facility]


   Last Admin: 08/15/18 21:54 Dose:  500 mg


Fenofibrate (Tricor)  145 mg PO DAILY [facility]


   Stop: 08/21/18 23:59


   Last Admin: 08/15/18 13:09 Dose:  145 mg


Gabapentin (Neurontin)  600 mg PO QID [facility]


   PRN Reason: Protocol


   Last Admin: 08/15/18 21:54 Dose:  600 mg


Haloperidol (Haldol)  5 mg PO Q6 PRN; Protocol


   PRN Reason: Agitation


   Last Admin: 08/15/18 11:50 Dose:  5 mg


Haloperidol Lactate (Haldol)  5 mg IM Q6H PRN; Protocol


   PRN Reason: Agitation


   Last Admin: 08/15/18 16:05 Dose:  5 mg


Ibuprofen (Motrin Tab)  400 mg PO Q6H PRN


   PRN Reason: Pain, moderate (4-7)


Lorazepam (Ativan)  1 mg IM Q6H PRN; Protocol


   PRN Reason: Anxiety


   Last Admin: 08/15/18 17:09 Dose:  1 mg


Lorazepam (Ativan)  1 mg PO Q6H PRN; Protocol


   PRN Reason: Agitation


   Last Admin: 08/15/18 11:50 Dose:  1 mg


Magnesium Hydroxide (Milk Of Magnesia)  30 ml PO DAILY PRN


   PRN Reason: Constipation


Mupirocin (Bactroban Ointment)  1 gm TOP BID [facility]


   Last Admin: 08/15/18 17:05 Dose:  1 applic


Pantoprazole Sodium (Protonix Ec Tab)  20 mg PO 0600 [facility]


   Last Admin: 08/16/18 06:50 Dose:  20 mg


Quetiapine Fumarate (Seroquel Xr)  300 mg PO QPM [facility]


   PRN Reason: Protocol


   Last Admin: 08/15/18 17:51 Dose:  300 mg


Quetiapine Fumarate (Seroquel)  100 mg PO HS PRN; Protocol


   PRN Reason: Agitation











Results





- Vital Signs


Recent Vital Signs: 


 Last Vital Signs











Temp  97.9 F   08/16/18 07:15


 


Pulse  79   08/16/18 07:15


 


Resp  20   08/16/18 07:15


 


BP  121/86   08/16/18 07:15


 


Pulse Ox      














- Labs


Labs: 


 Laboratory Results - last 24 hr











  08/15/18 08/15/18 08/15/18





  07:00 07:00 07:00


 


Triglycerides   567 H 


 


TSH 3rd Generation    1.26


 


RPR  Nonreactive  














Attending/Attestation





- Attestation


I have personally seen and examined this patient.: Yes


I have fully participated in the care of the patient.: Yes


I have reviewed all pertinent clinical information: Yes


Notes (Text): 


Patient seen and examined by me at 12:15PM with resident 8/15/18. Case 

including HPI, physical exam, and  assessment and plan discussed with resident. 

Agree with above with following additions/corrections.


Patient is a 47-year-old male with past medical history significant for bipolar 

disorder, schizoaffective disorder, polysubstance abuse, multiple psychiatric 

admissions, right lower extremity compartments syndrome status post fasciotomy, 

and osteomyelitis that presented to the emergency room 8/12/18 with altered 

mental status and shaking. Patient was admitted with altered mental status 

likely secondary to polypharmacy with psychiatric medications, leukocytosis, 

bipolar disorder, schizoaffective disorder, and history of polysubstance abuse. 

Patient at that time was also found to have a right foot wound. Podiatry was 

consulted. ID was consulted. Antibiotics were started. Area was not infected. 

There were no signs of infection. Leukocytosis resolved. Blood and urine 

cultures were negative. Local wound care was continued with right foot wound. 

Patient was seen by psychiatrist. Patient continued to have delusions and 

hallucinations. Patient was admitted to psychiatric floor.


We are consulted for follow-up. Patient states that he is feeling very anxious. 

Denies any right lower extremity pain. States that someone tried to take his 

right leg. Patient still with hallucinations. Denies any nausea, vomiting, or 

abdominal pain. No fevers or chills. No headaches or dizziness. No chest pain 

or shortness of breath. No dysuria.


12 point review of systems reviewed by me. See HPI. All other systems are 

negative.


Physical exam:


Gen: Awake and alert sitting up in bed in no acute distress


HEENT: Normocephalic, atraumatic. Extraocular muscles intact, pupils equal 

reactive. No scleral icterus. Oropharynx is pink and moist. No pharyngeal 

erythema or exudate appreciated. Neck is supple. Hearing grossly intact. Ears 

and nose externally unremarkable.


Cardiovascular: Normal rhythm.  Normal S1, S2. No murmurs, rubs, or gallops 

appreciated 


Pulmonary: Normal respiratory effort. No rhonchi, rales or wheezing 

appreciated. 


Gastrointestinal: Soft, nontender, nondistended, positive bowel sounds all 4 

quadrants, no guarding. 


Musculoskeletal: Normal range of motion all extremities, no calf tenderness. No 

CVA tenderness. 


Central nervous system:  Awake and alert. Oriented to self.


Dermatologic:  Skin warm and dry, right lower extremity with well-healed scar, 

right foot with dime-sized small healing wound on lateral foot


Assessment and plan:Patient is a 47-year-old male with past medical history 

significant for bipolar disorder, schizoaffective disorder, polysubstance abuse

, multiple psychiatric admissions, right lower extremity compartments syndrome 

status post fasciotomy, and osteomyelitis that presented to the emergency room 8 /12/18 with altered mental status and shaking. Patient was admitted to 

psychiatry for continued hallucinations and delusions. We are consulted for 

follow-up.


1. History of bipolar disorder, schizoaffective disorder. Care as per 

psychiatric team


2. Right foot wound. Continue local wound care with bacitracin. Patient to 

follow up outpatient with podiatry.


3. Hypertriglyceridemia. Started on Fenofibrate. Diet and exercise discussed 

with patient. Patient will need repeat blood work in 3 months. 


Case was discussed in detail with the patient regarding current diagnosis and 

treatment plan. Thank you for allowing us to participate in the care of your 

patient. There are no acute medical issues. We will sign off. Please reconsult 

if needed.

## 2018-08-15 NOTE — CON
Copied To:  Devendra Lopez MD

Attending MD:  Devendra Lopez MD



DATE:  08/14/2018



HISTORY OF PRESENT ILLNESS:  The patient is a 47-year-old white male with a

history of mental illness, likely bipolar disorder with psychotic features

versus schizoaffective disorder; history of polysubstance abuse and

dependence, severe in the past; history of involuntary commitment to [facility]; noncompliant to medications; who is being _____  by

Psychiatry on the medical floor due to increased confusion and mental

status changes, likely secondary to delirium in the background of

polypharmacy, although I cannot rule out recent psychiatric decompensation.

I am familiar with the patient from my prior consultation with him on

06/23/2018, as well as my followup with him yesterday which was largely

unsuccessful due to his very disorganized and confused mental status.  The

patient shows some improvements in focus and impulse control this morning,

although he still is confused, not provide the correct month or location

initially he has been restless on the unit and does require multiple p.r.n.

as it is difficult to redirect him.  He might initially be agreeable, but

this is only temporary and he starts to try to get out of bed or wander in

the hallway.  The patient is calm during my meeting with him and he is

generally cooperative and _____ but most of his interactions are

inappropriate and his responses can be irrelevant and inconsistent at

times.  At this time, he is still considered a very unreliable historian as

we truly feel that he remains delirious and requires medical attention at

this time.  He does appear to have been compliant with his current

medications, psychiatric medications on the unit.



Of note, I met with his sister in the hallway who also appears as his POA. 

_____ indicating that psychiatric medications were increased at the

patient's subacute rehab despite her protest and she feels that this

increase in medications directly contributed to his decline in his mental

status as she saw that he was doing very well after he was discharged from

[facility] in June.  The patient felt that he was on a very

good combination of psychiatric medications at the time of discharge.  The

patient was apparently home for about two weeks and during this time, his

mental status and impulsivity including organization and focus continued to

decline.  Family including mother and sister informed me that during the

course of these two weeks, mother's Xanax supply of 23 tablets became

missing and they are quite sure that he took it.  This was earlier in the

beginning of the two weeks and then they are quite certain that he has been

taking medications that he is not supposed to be taking as _____

medications have been discontinued in the past, possibly hydroxyzine as he

feels that this medication has been very beneficial from his anxiety. 

Regardless, it is unclear what medications the patient was taking, but his

mentation is certainly showing the effects of it as he is not at seen at

baseline and this appears to be a type of confusion that is more organic

than secondary to psychiatric decompensation.  His insight and judgement

are considered to be poor.  He does not have capacity to sign out.



Vital signs were reviewed as well as recent laboratory work.  Of note,

Depakote level was 30 on August 13, 2018.



His medications were reviewed and the patient is taking Cogentin 0.5 a.m.

and at bedtime, Depakote 500 a.m. and at bedtime, Neurontin 600 mg p.o.

four times a day, Haldol 5 mg every 6 hours p.r.n., Ativan 1 mg IV every 6

hours p.r.n., Seroquel  mg every evening.



IMPRESSION:  Delirium likely secondary to polypharmacy as well as history

of schizoaffective versus bipolar disorder by history.



RECOMMENDATIONS:  I will be increasing Seroquel XR to 500 mg every evening

_____ dose of 700 mg p.o. every evening and the patient's mother indicates

that his discharge medications from June 2018 medical admission was very

beneficial for him.  I will also continue Depakote 500 mg a.m. and at

bedtime as he is also on this medication.  I will  _____ with

anticholinergics ______ can contribute to confusion.  I will continue to

follow up with the patient on a daily basis, monitor his mental status and

continue to make one-to-one as he is unpredictable due to his confusion.







__________________________________________

Devendra Lopez MD



DD:  08/14/2018 14:02:16

DT:  08/14/2018 16:57:47

Job # [id number]
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
## 2018-08-15 NOTE — PCM.BM
<Kiran Kline - Last Filed: 08/15/18 00:14>





Treatment Plan Problems





- Problems identified on initial assessmt


  ** ALTERED THOUGHT PROCESS


Date Initiated: 08/14/18


Time Initiated: 20:00


Assessment reference: NA


Status: Active


Priority: 1





  ** AGGRESSION/AGITATED BEHAVIOR


Date Initiated: 08/14/18


Time Initiated: 20:00


Assessment reference: NA


Status: Active


Priority: 2





  ** INEFFECTIVE COPING


Date Initiated: 08/14/18


Time Initiated: 20:00





Treatment assets and liabiliti


Patient Assests: cooperative, insightful, motivated, self-reliant, ADL 

independent, physically healthy, good support system, negotiates basic needs, 

cognitively intact


Patient Liabilities: physical pain, dietary restrictions, substance abuse, 

medical problems, imparied memory





- Milieu Protocol


Maintain good personal hygiene: every other day Encourage regular showers, 

every shift Remind patient to perform daily oral care, every shift Assist 

patient to perform ADL's


Maintain personal safety: every shift Educate patient to report safety concerns 

to staff, every shift Monitor environment for contraband/sharps


Medication safety: Monitor for expected outcome, potential side effects: every 

shift, Assess barriers to learning: every shift, Assess readiness for 

medication education: every shift





Family Contact


Family involvement: Family/SO is involved


Family contact: Patient agrees to contact





Discharge/Continuing Care





- Education Needs


Education Needs: Patient Medication, Patient Diagnosis/Disease Process, Patient 

Coping Skills, Patient Anger Management skills, Patient Activities of Daily 

Living, Patient Pain, Patient Nutrition, Patient Uses of Medical Equipment, 

Patient Health Practices/Safety, Patient Personal Hygiene/Grooming





- Discharge


Discharge Criteria: Tolerates medication w/o severe side effects, Free of 

paranoid thoughts, Free of agitation, Normal sleep pattern, Ability to care for 

self





<Devendra Lopez - Last Filed: 08/15/18 11:43>





- Diagnosis


(1) Altered mental status


Status: Acute   


Interventions: 





08/15/18 11:44


group, milieu and supportive tx


* Haldol 5 mg + Ativan 2 mg po or IM prn: agitation


* Seroquel  mg qPM for history of lability, poor impulse control and 

disorganization. Titrate to prior effective dose of 700 mg.


* cogentin 0.5 mg [facility] for EPS prophylaxis


* Hold on Prozac and minimizing anticholinergics as much as possible as patient 

may have been taking excessive amounts of vistaril PTA


* Continue depakote 500 mg [facility] for mood and impulse control








(2) Mood disorder


Status: Acute   


Interventions: 


group, milieu and supportive tx


* Haldol 5 mg + Ativan 2 mg po or IM prn: agitation


* Seroquel  mg qPM for history of lability, poor impulse control and 

disorganization. Titrate to prior effective dose of 700 mg.


* cogentin 0.5 mg [facility] for EPS prophylaxis


* Hold on Prozac and minimizing anticholinergics as much as possible as patient 

may have been taking excessive amounts of vistaril PTA


* Continue depakote 500 mg [facility] for mood and impulse control


08/15/18 11:44











(3) Polysubstance abuse


Status: Chronic   


Interventions: 


group, milieu and supportive tx


* Haldol 5 mg + Ativan 2 mg po or IM prn: agitation


* Seroquel  mg qPM for history of lability, poor impulse control and 

disorganization. Titrate to prior effective dose of 700 mg.


* cogentin 0.5 mg [facility] for EPS prophylaxis


* Hold on Prozac and minimizing anticholinergics as much as possible as patient 

may have been taking excessive amounts of vistaril PTA


* Continue depakote 500 mg [facility] for mood and impulse control


08/15/18 11:44











<Keke Shin - Last Filed: 08/16/18 11:12>

## 2018-08-16 RX ADMIN — DIVALPROEX SODIUM SCH MG: 500 TABLET, DELAYED RELEASE ORAL at 09:21

## 2018-08-16 RX ADMIN — MUPIROCIN SCH APPLIC: 20 OINTMENT TOPICAL at 17:27

## 2018-08-16 RX ADMIN — PANTOPRAZOLE SODIUM SCH MG: 20 TABLET, DELAYED RELEASE ORAL at 06:50

## 2018-08-16 RX ADMIN — DIVALPROEX SODIUM SCH MG: 500 TABLET, DELAYED RELEASE ORAL at 21:25

## 2018-08-16 RX ADMIN — MUPIROCIN SCH APPLIC: 20 OINTMENT TOPICAL at 11:19

## 2018-08-16 NOTE — CP.PCM.DIS
Provider





- Provider


Date of Admission: 


08/12/18 05:08





Attending physician: 


Renetta Bourne DO





Consults: 





MAGNO Ballard


Podiatry


Neurology Valencia


Psych Lopez


Time Spent in preparation of Discharge (in minutes): 45





Hospital Course





- Lab Results


Lab Results: 


 Micro Results





08/12/18 08:30   Blood-Venous   Blood Culture - Preliminary


                            NO GROWTH AFTER 4 DAYS


08/12/18 08:00   Blood-Venous   Blood Culture - Preliminary


                            NO GROWTH AFTER 4 DAYS





 Most Recent Lab Values











WBC  6.7 10^3/ul (4.5-11.0)   08/14/18  06:45    


 


RBC  4.90 10^6/uL (3.5-6.1)   08/14/18  06:45    


 


Hgb  14.0 g/dL (14.0-18.0)   08/14/18  06:45    


 


Hct  41.3 % (42.0-52.0)  L  08/14/18  06:45    


 


MCV  84.3 fl (80.0-105.0)   08/14/18  06:45    


 


MCH  28.6 pg (25.0-35.0)   08/14/18  06:45    


 


MCHC  33.9 g/dl (31.0-37.0)   08/14/18  06:45    


 


RDW  12.2 % (11.5-14.5)   08/14/18  06:45    


 


Plt Count  152 10^3/uL (120.0-450.0)   08/14/18  06:45    


 


MPV  9.9 fl (7.0-11.0)   08/14/18  06:45    


 


pO2  207 mm/Hg (30-55)  H  08/12/18  01:26    


 


VBG pH  7.44  (7.32-7.43)  H  08/12/18  01:26    


 


VBG pCO2  41.0  (40-60)   08/12/18  01:26    


 


VBG HCO3  27.8 mmol/l (21-28)   08/12/18  01:26    


 


VBG Total CO2  29.1 mmol.L (22-28)  H  08/12/18  01:26    


 


VBG O2 Sat (Calc)  100.6 % (40-65)  H  08/12/18  01:26    


 


VBG Base Excess  3.3 mmol/L (0.0-2.0)  H  08/12/18  01:26    


 


VBG Potassium  4.0 mmol/L (3.6-5.2)   08/12/18  01:26    


 


Sodium  137.0 mmol/L (132-148)   08/12/18  01:26    


 


Chloride  103.0 mmol/L ()   08/12/18  01:26    


 


Glucose  107 mg/dl ()   08/12/18  01:26    


 


Lactate  1.8 mmol/L (0.7-2.1)   08/12/18  01:26    


 


FiO2  21.0 %  08/12/18  01:26    


 


Sodium  140 mmol/L (132-148)   08/11/18  23:34    


 


Potassium  4.1 mmol/L (3.6-5.0)   08/11/18  23:34    


 


Chloride  100 mmol/L ()   08/11/18  23:34    


 


Carbon Dioxide  27 mmol/L (21-33)   08/11/18  23:34    


 


Anion Gap  18  (10-20)   08/11/18  23:34    


 


BUN  15 mg/dL (7-21)   08/11/18  23:34    


 


Creatinine  0.9 mg/dl (0.8-1.5)   08/11/18  23:34    


 


Est GFR ( Amer)  > 60   08/11/18  23:34    


 


Est GFR (Non-Af Amer)  > 60   08/11/18  23:34    


 


POC Glucose (mg/dL)  93 mg/dL ()   08/11/18  23:36    


 


Random Glucose  95 mg/dL ()   08/11/18  23:34    


 


Calcium  9.7 mg/dL (8.4-10.5)   08/11/18  23:34    


 


Total Bilirubin  0.4 mg/dL (0.2-1.3)   08/11/18  23:34    


 


AST  40 U/L (17-59)   08/11/18  23:34    


 


ALT  37 U/L (7-56)   08/11/18  23:34    


 


Alkaline Phosphatase  91 U/L ()   08/11/18  23:34    


 


Lactate Dehydrogenase  438 U/L (333-699)   08/11/18  23:34    


 


Total Creatine Kinase  159 U/L ()   08/11/18  23:34    


 


Troponin I  < 0.01 ng/mL  08/11/18  23:34    


 


C-Reactive Protein  32.00 mg/L (0.0-9.9)  H  08/12/18  08:00    


 


Total Protein  7.8 g/dL (5.8-8.3)   08/11/18  23:34    


 


Albumin  4.6 g/dL (3.0-4.8)   08/11/18  23:34    


 


Globulin  3.2 gm/dL  08/11/18  23:34    


 


Albumin/Globulin Ratio  1.4  (1.1-1.8)   08/11/18  23:34    


 


Procalcitonin  0.05 NG/ML (0.19-0.49)  L  08/12/18  05:00    


 


Venous Blood Potassium  4.0 mmol/L (3.6-5.2)   08/12/18  01:26    


 


Urine Color  Yellow  (YELLOW)   08/12/18  01:26    


 


Urine Appearance  Clear  (CLEAR)   08/12/18  01:26    


 


Urine pH  6.0  (4.7-8.0)   08/12/18  01:26    


 


Ur Specific Gravity  >= 1.030  (1.005-1.035)   08/12/18  01:26    


 


Urine Protein  Trace mg/dL (<30 mg/dL)  H  08/12/18  01:26    


 


Urine Glucose (UA)  Negative mg/dL (NEGATIVE)   08/12/18  01:26    


 


Urine Ketones  Trace mg/dL (NEGATIVE)  H  08/12/18  01:26    


 


Urine Blood  Negative  (NEGATIVE)   08/12/18  01:26    


 


Urine Nitrate  Negative  (NEGATIVE)   08/12/18  01:26    


 


Urine Bilirubin  Negative  (NEGATIVE)   08/12/18  01:26    


 


Urine Urobilinogen  0.2 E.U./dL (<1 E.U./dL)   08/12/18  01:26    


 


Ur Leukocyte Esterase  Negative Crystal/uL (NEGATIVE)   08/12/18  01:26    


 


Urine RBC  0 - 2 /hpf (0-2)   08/12/18  01:26    


 


Urine WBC  0 - 2 /hpf (0-6)   08/12/18  01:26    


 


Ur Epithelial Cells  0 - 2 /hpf (0-5)   08/12/18  01:26    


 


Salicylates  < 1 mg/dL (2.0-20.0)  L  08/11/18  23:34    


 


Urine Opiates Screen  Negative  (NEGATIVE)   08/12/18  01:26    


 


Urine Methadone Screen  Negative  (NEGATIVE)   08/12/18  01:26    


 


Acetaminophen  < 10.0 ug/ml (10.0-20.0)  L  08/11/18  23:34    


 


Ur Barbiturates Screen  Negative  (NEGATIVE)   08/12/18  01:26    


 


Valproic Acid  30 ug/mL (50.0-100.0)  L  08/13/18  13:00    


 


Ur Phencyclidine Scrn  Negative  (NEGATIVE)   08/12/18  01:26    


 


Ur Amphetamines Screen  Negative  (NEGATIVE)   08/12/18  01:26    


 


U Benzodiazepines Scrn  Negative  (NEGATIVE)   08/12/18  01:26    


 


U Oth Cocaine Metabols  Negative  (NEGATIVE)   08/12/18  01:26    


 


U Cannabinoids Screen  Negative  (NEGATIVE)   08/12/18  01:26    


 


Alcohol, Quantitative  < 10 mg/dL (0-10)   08/11/18  23:34    














- Hospital Course


Hospital Course: 





On Admission:


48 y/o male with past medical history of bipolar disorder, schizoaffective 

disorder, polysubstance abuse, multiple psychiatric admissions, R LE 

compartment syndrome s/p fasciotomy, osteomyelitis presented to ED on 8/12/2018 

after being brought in by EMS for altered mental status and leukocytosis.





Hospital course: 


Patient was admitted with altered mental status likely secondary to 

polypharmacy with psychiatric medications. Patient was found to have delusions 

and hallucinations, no active suicidal/homicidal thoughts. The patient was seen 

by psychiatry and his psych meds got adjusted accordingly. Patient had history 

of seizure disorder, neurology was consulted and recommended to continue on 

Keppra and gabapentin to reduce the possibility of having a seizure while on 

psych medications. Patient had a history of polysubstance abuse. Urine drug 

screen was negative on admission.  Education on cessation was provided. Patient 

was also found to have a right foot wound, topical antibiotics were started. 

Podiatry and ID were consulted. There were no signs of infection.  Right foot 

xray done without evidence of infiltrates. Podiatry evaluated the patient with 

no further recommendations for work up and advised the patient to follow up 

outpatient. ID evaluated patient with recommendations to monitor off 

antibiotics and follow up outpatient with podiatry and continue wound dressing. 

Continue Mupirocin 2% ointment application to affected area.


Patient had elevated TG of 567 and CH of 232. Started him on Fenofibrate 145 

mg. Patient was counseled for daily healthy diet and exercise to better control 

his elevated lipid panel. Follow up lipid panel to be done in 3 months. 





Patient continued to have hallucinations, deletions, and delirium while being 

in medical moody.  Patient was deemed medically optimized for transfer to the 

psychiatric floor with voluntary admission. Patients sister who is the POA 

agreed and patient was admitted to psychiatric floor.





Discharge Exam





- Head Exam


Head Exam: ATRAUMATIC, NORMAL INSPECTION, NORMOCEPHALIC





- Eye Exam


Eye Exam: EOMI, Normal appearance, PERRL.  absent: Conjunctival injection, 

Scleral icterus


Pupil Exam: NORMAL ACCOMODATION





- ENT Exam


ENT Exam: Mucous Membranes Moist





- Neck Exam


Neck exam: Full Rom





- Respiratory Exam


Respiratory Exam: NORMAL BREATHING PATTERN, UNREMARKABLE.  absent: Accessory 

Muscle Use, Rales, Rhonchi, Wheezes





- Cardiovascular Exam


Cardiovascular Exam: REGULAR RHYTHM, +S1, +S2





- GI/Abdominal Exam


GI & Abdominal Exam: Normal Bowel Sounds, Soft.  absent: Firm, Guarding, Rigid, 

Tenderness





- Rectal Exam


Rectal Exam: Deferred





- Back Exam


Back exam: NORMAL INSPECTION.  absent: CVA tenderness (L), CVA tenderness (R), 

tenderness





- Psychiatric Exam


Psychiatric exam: Anxious, Depressed, Flat Affect





- Skin


Skin Exam: Dry, Intact, Normal Color, Warm


Additional comments: 





skin blister on right dorsal foot, clean, no pus, no odor





Discharge Plan





- Follow Up Plan


Condition: STABLE


Disposition: DISCHARGE TO PSYCH HOSPITAL


Instructions:  Altered Mental Status (DC), Narcotic Overdose  (DC), 

Polysubstance Abuse (DC), Altered Mental Status (GEN)


Additional Instructions: 


Patient is medically cleared for transfer to voluntary psychiatric unit.
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
## 2018-08-16 NOTE — CP.PCM.DIS
Provider





- Provider


Date of Admission: 


08/14/18 18:47





Attending physician: 


Devendra Lopez MD





Time Spent in preparation of Discharge (in minutes): 45





Hospital Course





- Lab Results


Lab Results: 


 Most Recent Lab Values











Fasting Glucose  95 mg/dL ()   08/15/18  07:00    


 


Triglycerides  567 mg/dL ()  H  08/15/18  07:00    


 


Cholesterol  232 mg/dL (130-200)  H  08/15/18  07:00    


 


LDL Cholesterol Direct  123 mg/dL (0-129)   08/15/18  07:00    


 


HDL Cholesterol  30 mg/dL (29-60)   08/15/18  07:00    


 


TSH 3rd Generation  1.26 mIU/mL (0.46-4.68)   08/15/18  07:00    


 


RPR  Nonreactive  (NONREACTIVE)   08/15/18  07:00    














- Hospital Course


Hospital Course: 





48 y/o male with pmhx of bipolar disorder, schizoaffective disorder, 

polysubstance abuse, multiple psychiatric admissions, R LE compartment syndrome 

s/p fasciotomy, osteomyelitis presented to ED on 8/12/2018 after being bib EMS 

for altered mental status and confusion likely due to polypharmacy with 

psychiatric medications.








Hospital course: 


Delirium was likely secondary to polysubstance abuse and polypharmacy. Delirium 

has been mproving since admission. Patient had history of schizoaffective 

disorder that was managed in the medical moody  as per psychiatry. 


Patient had a history of polysubstance abuse. Urine drug screen was negative on 

admission. Education on cessation


 Right foot cellulitis (right 1st, 2nd, 5th toe) , right foot xray done without 

evidence of infiltrates. Podiatry evaluated the patient during inpatient 

hospital stay with no further recommendations for work up, follow up 

outpatient. ID evaluated patient with recommendations to monitor off 

antibiotics and follow up outpatient with Podiatry and continue wound dressing. 

Continue Mupirocin 2% ointment application to affected area.


Patient had elevated TG of 567 and CH of 232. Started him on Fenofibrate 145 

mg. Patient was counseled for daily healthy diet and exercise to better control 

his elevated lipid panel. 








-


-Patient admitted to psychiatric floor, continue to follow psych recs. Patient 

is medically stable, will sign off on patient. Please re-consult as needed. 








- Date & Time of H&P


Date of H&P: 08/14/18


Time of H&P: 14:10





Discharge Exam





- Head Exam


Head Exam: ATRAUMATIC, NORMAL INSPECTION, NORMOCEPHALIC





- Eye Exam


Eye Exam: EOMI, Normal appearance, PERRL


Pupil Exam: NORMAL ACCOMODATION, PERRL





- ENT Exam


ENT Exam: Normal Exam, Normal Oropharynx





- Neck Exam


Neck exam: Full Rom, Normal Inspection





- Respiratory Exam


Respiratory Exam: Clear to PA & Lateral, NORMAL BREATHING PATTERN





- Cardiovascular Exam


Cardiovascular Exam: REGULAR RHYTHM, +S1, +S2





- GI/Abdominal Exam


GI & Abdominal Exam: Normal Bowel Sounds, Unremarkable.  absent: Mass, Rebound, 

Rigid





- Rectal Exam


Rectal Exam: Deferred





- Extremities Exam


Extremities exam: normal capillary refill, pedal pulses present


Additional comments: 





right lower leg atrophy s/p fasciotomy. surgical scar is dry, no erythema or 

signs of infection.





- Back Exam


Back exam: FULL ROM.  absent: CVA tenderness (L), CVA tenderness (R)





- Neurological Exam


Neurological exam: Alert, CN II-XII Intact





- Psychiatric Exam


Psychiatric exam: Anxious, Depressed, Flat Affect





- Skin


Skin Exam: Dry, Intact, Normal Color, Warm





Discharge Plan





- Follow Up Plan


Condition: GOOD


Disposition: DISCHARGE TO PSYCH HOSPITAL


Instructions:  Delirium (Confusion), Schizoaffective Disorder (DC), 

Polysubstance Abuse (DC), Bipolar Disorder (DC), Altered Mental Status (GEN)

## 2018-08-17 RX ADMIN — DIVALPROEX SODIUM SCH MG: 500 TABLET, DELAYED RELEASE ORAL at 09:52

## 2018-08-17 RX ADMIN — MUPIROCIN SCH APPLIC: 20 OINTMENT TOPICAL at 16:28

## 2018-08-17 RX ADMIN — PANTOPRAZOLE SODIUM SCH MG: 20 TABLET, DELAYED RELEASE ORAL at 06:44

## 2018-08-17 RX ADMIN — MUPIROCIN SCH APPLIC: 20 OINTMENT TOPICAL at 08:01

## 2018-08-17 RX ADMIN — DIVALPROEX SODIUM SCH MG: 500 TABLET, DELAYED RELEASE ORAL at 22:59
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
## 2018-08-17 NOTE — CP.PCM.CON
<Jacob Scanlon - Last Filed: 08/17/18 12:43>





History of Present Illness





- History of Present Illness


History of Present Illness: 





Jacob Scanlon PGY2 Neurology Consult Note for Dr. Engel





Mr. Garcia is a 48-year-old man with a past medical history that is 

significant for bipolar disorder, schizoaffective disorder, polysubstance abuse

, multiple psychiatric admissions, right leg compartment syndrome requiring 

fasciotomy with subsequent osteomyelitis requiring IV antibiotics through PICC 

line that was placed during previous admission of 6/16/18.  There were some 

issues with his psychiatric medications during which he was apparently taking 

high doses of hydroxyzine, seroquel, and cogentin.  He is also on Depakote and 

high doses of Gabapentin.  Apparently, during previous hospitalizations, he was 

becoming confused and having episodes of shaking after the medications.  

Currently, the patient is conversant and does not have any complaints.  Vital 

signs are otherwise normal.  CT head on 8/11/18 did not show any significant 

findings. 12-pt ROS was reviewed and is otherwise unremarkable. 





PMD: Dr. Hanson





PMH: as above


PSH: fasciotomy RLE


Meds: reviewed, as per MAR


Allergies: Contrast dye


SHx: smokes 1/2 ppd for >20 years, denies alcohol or drug consumption   


FHx: cancer on mother's side and mental disease on father's side





Review of Systems





- Review of Systems


All systems: reviewed and no additional remarkable complaints except (as per HPI

)





Past Patient History





- Infectious Disease


Hx of Infectious Diseases: None





- Tetanus Immunizations


Tetanus Immunization: Unknown





- Past Medical History & Family History


Past Medical History?: Yes





- Past Social History


Smoking Status: Heavy Smoker > 10 Cigarettes Daily


Alcohol: None


Drugs: Denies





- CARDIAC


Hx Cardiac Disorders: No


Hx Hypertension: No





- PULMONARY


Hx Respiratory Disorders: No





- NEUROLOGICAL


HX Cerebrovascular Accident: No





- HEENT


Hx HEENT Problems: No





- RENAL


Hx Chronic Kidney Disease: No





- ENDOCRINE/METABOLIC


Hx Endocrine Disorders: No





- HEMATOLOGICAL/ONCOLOGICAL


Hx Cancer: No





- INTEGUMENTARY


Hx Dermatological Problems: Yes (right leg swelling/ cellulitis)





- MUSCULOSKELETAL/RHEUMATOLOGICAL


Hx Musculoskeletal Disorders: Yes


Hx Falls: Yes


Hx Fractures: Yes


Other/Comment: Brace to right leg, Hx. fasciectomy.





- GASTROINTESTINAL


Hx Gastrointestinal Disorders: No





- GENITOURINARY/GYNECOLOGICAL


Hx Genitourinary Disorders: No





- PSYCHIATRIC


Hx Psychophysiologic Disorder: Yes


Hx Anxiety: Yes


Hx Bipolar Disorder: Yes


Hx Depression: Yes


Hx Substance Use: Yes





- SURGICAL HISTORY


Hx Surgeries: No


Hx Mastectomy: No





- ANESTHESIA


Hx Anesthesia: Yes


Hx Anesthesia Reactions: No


Hx Malignant Hyperthermia: No





Meds


Allergies/Adverse Reactions: 


 Allergies











Allergy/AdvReac Type Severity Reaction Status Date / Time


 


Iodinated Contrast- Oral and Allergy  ITCHING Verified 08/14/18 23:22





IV Dye     














- Medications


Medications: 


 Current Medications





Acetaminophen (Tylenol 325mg Tab)  650 mg PO Q4H PRN


   PRN Reason: Pain, Mild (1-3)


Al Hydrox/Mg Hydrox/Simethicone (Maalox Plus 30 Ml)  30 ml PO DAILY PRN


   PRN Reason: Upset Stomach


Aspirin (Aspirin Chewable)  81 mg PO DAILY [facility]


   Last Admin: 08/17/18 08:01 Dose:  81 mg


Benztropine Mesylate (Cogentin)  0.5 mg PO [facility]


   Last Admin: 08/17/18 09:51 Dose:  0.5 mg


Divalproex Sodium (Depakote Dr(*Bid*))  500 mg PO [facility]


   Last Admin: 08/17/18 09:52 Dose:  500 mg


Fenofibrate (Tricor)  145 mg PO DAILY [facility]


   Stop: 08/21/18 23:59


   Last Admin: 08/17/18 08:00 Dose:  145 mg


Fluoxetine HCl (Prozac)  10 mg PO DAILY [facility]


   Last Admin: 08/17/18 08:01 Dose:  10 mg


Gabapentin (Neurontin)  600 mg PO QID [facility]


   PRN Reason: Protocol


   Last Admin: 08/17/18 07:59 Dose:  600 mg


Haloperidol (Haldol)  5 mg PO Q6 PRN; Protocol


   PRN Reason: Agitation


   Last Admin: 08/17/18 08:01 Dose:  5 mg


Haloperidol Lactate (Haldol)  5 mg IM Q6H PRN; Protocol


   PRN Reason: Agitation


   Last Admin: 08/16/18 19:01 Dose:  5 mg


Ibuprofen (Motrin Tab)  400 mg PO Q6H PRN


   PRN Reason: Pain, moderate (4-7)


Lorazepam (Ativan)  1 mg IM Q6H PRN; Protocol


   PRN Reason: Anxiety


   Last Admin: 08/16/18 19:02 Dose:  1 mg


Lorazepam (Ativan)  1 mg PO Q6H PRN; Protocol


   PRN Reason: Agitation


   Last Admin: 08/17/18 08:00 Dose:  1 mg


Magnesium Hydroxide (Milk Of Magnesia)  30 ml PO DAILY PRN


   PRN Reason: Constipation


Mupirocin (Bactroban Ointment)  1 gm TOP BID [facility]


   Last Admin: 08/17/18 08:01 Dose:  1 applic


Pantoprazole Sodium (Protonix Ec Tab)  20 mg PO 0600 [facility]


   Last Admin: 08/17/18 06:44 Dose:  20 mg


Quetiapine Fumarate (Seroquel)  100 mg PO HS PRN; Protocol


   PRN Reason: Agitation


   Last Admin: 08/16/18 21:25 Dose:  100 mg


Quetiapine Fumarate 300 mg/ (Quetiapine Fumarate 200 mg)  500 mg PO QPM [facility]


   Last Admin: 08/16/18 17:28 Dose:  500 mg











Physical Exam





- Additional Findings


Additional findings: 





- Constitutional


Appears: Well, Non-toxic, No Acute Distress





- Head Exam


Head Exam: ATRAUMATIC, NORMAL INSPECTION





- Eye Exam


Eye Exam: EOMI, Normal appearance





- ENT Exam


ENT Exam: Mucous Membranes Moist, Normal Exam





- Neck Exam


Neck exam: Positive for: Normal Inspection.  Negative for: Meningismus





- Respiratory Exam


Respiratory Exam: Clear to Auscultation Bilateral, NORMAL BREATHING PATTERN





- Cardiovascular Exam


Cardiovascular Exam: REGULAR RHYTHM, +S1, +S2





- GI/Abdominal Exam


GI & Abdominal Exam: Hypoactive Bowel Sounds, Soft





- Extremities Exam


Extremities exam: Positive for: normal inspection.  Negative for: calf 

tenderness


Additional comments: 





RLE atrophied s/p fasciotomy





- Neurological Exam


Neurological exam: Alert, Oriented x3





- Psychiatric Exam


Psychiatric exam: Normal Affect, Normal Mood





- Skin


Skin Exam: Dry, Intact, Warm





Results





- Vital Signs


Recent Vital Signs: 


 Last Vital Signs











Temp  97.9 F   08/17/18 06:54


 


Pulse  74   08/17/18 06:54


 


Resp  20   08/17/18 06:54


 


BP  112/82   08/17/18 06:54


 


Pulse Ox      














Assessment & Plan





- Assessment and Plan (Free Text)


Assessment: 





48 year old male with a significant psych hx who presents to Psych unit from 

medicine floors. Neurology consulted for confusion. Patient is currently alert 

and oriented x3 and is not confused. Essentially this has resolved and could be 

multifactorial, with polypharmacy being top differential.





Plan: 








- consider decreasing neurontin dose as it is not an effective antiepileptic 

durg and has a variety of side effects


- recommend continuing Depakote to prevent seizures in the setting of the 

current antipsychotic medications that can lower the seizure threshold.  


- consider an EEG and an MRI if symptoms return and persist.


- further recs per Dr. Engel





will sign off at this time. please reconsult if needed.





Case was reviewed and discussed with attending, Dr. Erik Scanlon PGY2 





<Andres Engel - Last Filed: 08/21/18 12:53>





Meds





- Medications


Medications: 


 Current Medications





Acetaminophen (Tylenol 325mg Tab)  650 mg PO Q4H PRN


   PRN Reason: Pain, Mild (1-3)


Al Hydrox/Mg Hydrox/Simethicone (Maalox Plus 30 Ml)  30 ml PO DAILY PRN


   PRN Reason: Upset Stomach


Aspirin (Aspirin Chewable)  81 mg PO DAILY [facility]


   Last Admin: 08/21/18 09:01 Dose:  81 mg


Benztropine Mesylate (Cogentin)  0.5 mg PO AMHS [facility]


   Last Admin: 08/21/18 09:02 Dose:  0.5 mg


Divalproex Sodium (Depakote Dr(*Bid*))  750 mg PO HS [facility]


   Last Admin: 08/20/18 22:38 Dose:  750 mg


Divalproex Sodium (Depakote Dr(*Bid*))  500 mg PO QAM [facility]


   Last Admin: 08/21/18 09:02 Dose:  500 mg


Fenofibrate (Tricor)  145 mg PO DAILY [facility]


   Stop: 08/21/18 23:59


   Last Admin: 08/21/18 09:02 Dose:  145 mg


Fluoxetine HCl (Prozac)  40 mg PO DAILY [facility]


Gabapentin (Neurontin)  400 mg PO QID [facility]


   PRN Reason: Protocol


   Last Admin: 08/21/18 09:02 Dose:  400 mg


Haloperidol (Haldol)  5 mg PO Q6 PRN; Protocol


   PRN Reason: Agitation


   Last Admin: 08/20/18 07:09 Dose:  5 mg


Haloperidol Lactate (Haldol)  5 mg IM Q6H PRN; Protocol


   PRN Reason: Agitation


   Last Admin: 08/19/18 09:08 Dose:  5 mg


Ibuprofen (Motrin Tab)  400 mg PO Q6H PRN


   PRN Reason: Pain, moderate (4-7)


   Last Admin: 08/17/18 16:33 Dose:  400 mg


Lorazepam (Ativan)  1 mg IM Q6H PRN; Protocol


   PRN Reason: Anxiety


   Last Admin: 08/18/18 18:35 Dose:  1 mg


Lorazepam (Ativan)  1 mg PO Q6H PRN; Protocol


   PRN Reason: Agitation


   Last Admin: 08/21/18 11:33 Dose:  1 mg


Magnesium Hydroxide (Milk Of Magnesia)  30 ml PO DAILY PRN


   PRN Reason: Constipation


Mupirocin (Bactroban Ointment)  1 gm TOP BID [facility]


   Last Admin: 08/21/18 09:03 Dose:  1 applic


Pantoprazole Sodium (Protonix Ec Tab)  20 mg PO 0600 [facility]


   Last Admin: 08/21/18 06:17 Dose:  20 mg


Quetiapine Fumarate (Seroquel Xr)  100 mg PO QPM [facility]


   Last Admin: 08/20/18 17:42 Dose:  100 mg


Quetiapine Fumarate (Seroquel Xr)  600 mg PO QPM [facility]


   Last Admin: 08/20/18 17:42 Dose:  600 mg











Results





- Vital Signs


Recent Vital Signs: 


 Last Vital Signs











Temp  98.7 F   08/21/18 07:14


 


Pulse  85   08/21/18 07:14


 


Resp  20   08/21/18 07:14


 


BP  128/74   08/21/18 07:14


 


Pulse Ox      














Assessment & Plan





- Assessment and Plan (Free Text)


Plan: 





Attestation: 


I examined the patient myself and found the neurological exam to be unchanged. 


Agree with the assessment and the plan that i assisted in formulating. 


Thank you


Dr. engel

## 2018-08-18 RX ADMIN — PANTOPRAZOLE SODIUM SCH MG: 20 TABLET, DELAYED RELEASE ORAL at 06:56

## 2018-08-18 RX ADMIN — DIVALPROEX SODIUM SCH MG: 500 TABLET, DELAYED RELEASE ORAL at 09:06

## 2018-08-18 RX ADMIN — DIVALPROEX SODIUM SCH MG: 500 TABLET, DELAYED RELEASE ORAL at 21:27

## 2018-08-18 RX ADMIN — QUETIAPINE SCH MG: 300 TABLET, EXTENDED RELEASE ORAL at 17:31

## 2018-08-18 RX ADMIN — MUPIROCIN SCH APPLIC: 20 OINTMENT TOPICAL at 08:49

## 2018-08-18 RX ADMIN — QUETIAPINE SCH MG: 50 TABLET, EXTENDED RELEASE ORAL at 17:32

## 2018-08-18 RX ADMIN — MUPIROCIN SCH APPLIC: 20 OINTMENT TOPICAL at 17:20
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
## 2018-08-19 RX ADMIN — DIVALPROEX SODIUM SCH MG: 500 TABLET, DELAYED RELEASE ORAL at 10:09

## 2018-08-19 RX ADMIN — MUPIROCIN SCH APPLIC: 20 OINTMENT TOPICAL at 08:12

## 2018-08-19 RX ADMIN — MUPIROCIN SCH APPLIC: 20 OINTMENT TOPICAL at 16:04

## 2018-08-19 RX ADMIN — PANTOPRAZOLE SODIUM SCH MG: 20 TABLET, DELAYED RELEASE ORAL at 06:52

## 2018-08-19 RX ADMIN — QUETIAPINE SCH MG: 50 TABLET, EXTENDED RELEASE ORAL at 17:29

## 2018-08-19 RX ADMIN — QUETIAPINE SCH MG: 300 TABLET, EXTENDED RELEASE ORAL at 17:29

## 2018-08-19 RX ADMIN — DIVALPROEX SODIUM SCH MG: 500 TABLET, DELAYED RELEASE ORAL at 09:04

## 2018-08-19 RX ADMIN — DIVALPROEX SODIUM SCH MG: 500 TABLET, DELAYED RELEASE ORAL at 23:15
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
## 2018-08-20 VITALS — RESPIRATION RATE: 20 BRPM

## 2018-08-20 VITALS — HEART RATE: 85 BPM

## 2018-08-20 RX ADMIN — DIVALPROEX SODIUM SCH MG: 500 TABLET, DELAYED RELEASE ORAL at 10:44

## 2018-08-20 RX ADMIN — QUETIAPINE SCH MG: 50 TABLET, EXTENDED RELEASE ORAL at 17:42

## 2018-08-20 RX ADMIN — DIVALPROEX SODIUM SCH MG: 500 TABLET, DELAYED RELEASE ORAL at 22:38

## 2018-08-20 RX ADMIN — QUETIAPINE SCH MG: 300 TABLET, EXTENDED RELEASE ORAL at 17:42

## 2018-08-20 RX ADMIN — PANTOPRAZOLE SODIUM SCH MG: 20 TABLET, DELAYED RELEASE ORAL at 05:40

## 2018-08-20 RX ADMIN — MUPIROCIN SCH APPLIC: 20 OINTMENT TOPICAL at 17:40

## 2018-08-20 RX ADMIN — MUPIROCIN SCH APPLIC: 20 OINTMENT TOPICAL at 09:29

## 2018-08-20 NOTE — PCM.PYCHPN
Psychiatric Progress Note





- Psychiatric Progress Note


Patient seen today, length of contact: 30 min


Patient Chief Complaint: 





"I am feeling better"


Problems Identified/Issues Discussed: 


Suicide/ homicide prevention, past psychiatric h/o, current psychiatric symptoms

, medical problems, risk/benefits and alternatives of medications, medications 

compliance, coping strategies, substance abuse h/o, relapse prevention, 

importance of follow up with psychiatrist and therapist, discharge plan. 





Medical Problems: 





see HPI





Diagnostic Results: 








 Lab Results





08/17/18 10:30: Valproic Acid 57


08/15/18 07:00: TSH 3rd Generation 1.26


08/15/18 07:00: Fasting Glucose 95, Triglycerides 567 H, Cholesterol 232 H, LDL 

Cholesterol Direct 123, HDL Cholesterol 30


08/15/18 07:00: RPR Nonreactive











Vital Signs











  Temp Pulse Pulse Resp BP


 


 08/20/18 06:56  97.2 F L  100 H   20  126/92 H


 


 08/19/18 16:00   101 H    130/76


 


 08/19/18 07:00  98.3 F  96 H   16  138/79


 


 08/18/18 15:00   89   20  125/74


 


 08/18/18 07:00  97.9 F  86   20  126/91 H


 


 08/18/18 06:59  97.9 F  86   20  126/91 H


 


 08/17/18 16:25   89    127/91 H


 


 08/17/18 06:54  97.9 F  74   20  112/82


 


 08/16/18 16:00   81    138/89


 


 08/16/18 07:15  97.9 F  79   20  121/86


 


 08/15/18 16:00   93 H    130/78


 


 08/15/18 07:13  97.8 F  82   20  126/85


 


 08/14/18 22:00    60  20 


 


 08/14/18 19:13  97.7 F  96 H   20  131/74











DSM 5 Symptoms Update: 


Shortly pt is  a 46yo male with history of bipolar vs schizoaffective disorder, 

polysubstance abuse and dependence, h/o noncompliance with the medications and 

follow up appointments,  multiple prior psychiatric admissions including 

involuntary commitment to [facility], (most recently discharged 

from [facility] psychiatric inpatient unit 6/14/18) was transferred from the medical 

floor for treatment of confusion, disorganization and agitation.





This writer reviewed the notes, discussed with treatment team, meds reviewed, 

pt was seen at the treatment team meeting room. 





pt is very familiar to this writer/unit from multiple psychiatric admissions, 

most recent was in June 2018. 





pt presented relatively well, at times presented with difficulties to 

concentrate and stay focused, keep asking to repeat the question. 





pt said he feels better, denied being depressed, denied thoughts of harming 

self or others. 





pt said that he did not use any drugs since the last admission. 





pt said that he does not hear voices or seeing things. 





as per staff it is the first day when pt presented well, before pt was very 

confused, luis Christina was called at the time of admission. 





as per 's evaluation pt was still in delirium stage, had sundowning 

syndrome, but it seems to be decreasing in severity. Specifically, his symptoms 

of restlessness, confusion, lability, agitation and paranoia are slowly getting 

better every day consistent with resolving delirium. Patient still remains too 

disorganized for discharge. 





His I/J is still poor regarding need for outpatient treatment for his substance 

use. Appreciate SW note on 8/17/18 indicating efforts to encourage patient to 

engage in outpatient treatment after discharge, which patient continues to 

refuse. 





so far pt tolerates meds well, no side effects observed or reported, AIMS 0, no 

EPS.








Diagnostic Results: 


Schizoaffective Disorder by history


Improving Delirium (r/o anticholinergic delirium)


Polysubstance Abuse and dependency


SIPD and SIMD





Medication Change: No (depakote and prozac increased yesterday)


Medical Record Reviewed: Yes


Consults ordered or reviewed: 


pt was transferred from the Med/surg floor








Mental Status Examination





- Cognitive Function


Orientation: Person, Place, Situation, Time


Memory: Intact (but pt has episodes of confusions)


Attention: Poor


Concentration: Poor


Association: WNL


Fund of Knowledge: WNL





- Mood


Mood: Depressed (improving), Anxious (improving)





- Affect


Affect: Constricted (but more reactive)





- Speech


Speech: Appropriate





- Formal Thought Process


Formal Thought Process: Loosening of associations





- Suicidal Ideation


Suicidal Ideation: No





- Homicidal Ideation


Homicidal Ideation: No





Goal/Treatment Plan





- Goal/Treatment Plan


Need for Continued Stay: Remain at risks for inpatient hospitalization, Severe 

depression anxiety, Discharge may exacerbated symptoms, Severe functional 

impairment


Progress Toward Problem(s) and Goals/Treatment Plan: 


group, milieu and supportive tx


Haldol 5 mg + Ativan 2 mg po or IM prn: agitation


Seroquel XR increased to 700 mg qPM for history of lability, poor impulse 

control and disorganization.  


cogentin 0.5 mg AMHS for EPS prophylaxis


Prozac to 30 mg po daily with the plant to titrate to effective dose of 40 mg 

po daily.  


depakote 500 mg AM and 750 HS was increased on 8/19/18 for mood and impulse 

control


SW consultation for discharge plan and social issues 


Family involvement,  discussed with pt's POA 8/16/18


Follow up on labs 


Will monitor closely 


Pt was educated about risk/benefits and alternatives of medications, coping 

strategies (safety plan, suicide prevention), relapse prevention, importance of 

follow up with psychiatrist and therapist, stay away from drugs/alcohol/smoking





Estimated Date of D/C: 08/22/18

## 2018-08-21 VITALS — SYSTOLIC BLOOD PRESSURE: 128 MMHG | TEMPERATURE: 98.7 F | DIASTOLIC BLOOD PRESSURE: 74 MMHG

## 2018-08-21 RX ADMIN — DIVALPROEX SODIUM SCH MG: 500 TABLET, DELAYED RELEASE ORAL at 09:02

## 2018-08-21 RX ADMIN — PANTOPRAZOLE SODIUM SCH MG: 20 TABLET, DELAYED RELEASE ORAL at 06:17

## 2018-08-21 RX ADMIN — MUPIROCIN SCH APPLIC: 20 OINTMENT TOPICAL at 09:03
